# Patient Record
Sex: FEMALE | Race: WHITE | Employment: OTHER | URBAN - METROPOLITAN AREA
[De-identification: names, ages, dates, MRNs, and addresses within clinical notes are randomized per-mention and may not be internally consistent; named-entity substitution may affect disease eponyms.]

---

## 2017-01-11 ENCOUNTER — OFFICE VISIT (OUTPATIENT)
Dept: RHEUMATOLOGY | Age: 62
End: 2017-01-11

## 2017-01-11 VITALS
BODY MASS INDEX: 17.61 KG/M2 | DIASTOLIC BLOOD PRESSURE: 80 MMHG | TEMPERATURE: 98 F | SYSTOLIC BLOOD PRESSURE: 138 MMHG | HEIGHT: 66 IN | HEART RATE: 81 BPM | WEIGHT: 109.6 LBS

## 2017-01-11 DIAGNOSIS — M06.09 SERONEGATIVE RHEUMATOID ARTHRITIS OF MULTIPLE SITES (HCC): ICD-10-CM

## 2017-01-11 DIAGNOSIS — M19.90 OSTEOARTH NOS-UNSPEC: Primary | ICD-10-CM

## 2017-01-11 NOTE — MR AVS SNAPSHOT
Visit Information Date & Time Provider Department Dept. Phone Encounter #  
 1/11/2017  8:20 AM Juan Jones MD [facility] [id number] Follow-up Instructions Return in about 4 months (around 5/11/2017). Your Appointments 1/24/2017 11:00 AM  
ESTABLISHED PATIENT with MD Shruti Zuniga. [address] [facility]) Appt Note:   
 [address] [facility] [address] [place] [address]  
[phone] [address] Upcoming Health Maintenance Date Due COLONOSCOPY 8/22/1975 Pneumococcal 19-64 Medium Risk (1 of 1 - PPSV23) 8/22/1976 INFLUENZA AGE 9 TO ADULT 8/1/2016 BREAST CANCER SCRN MAMMOGRAM 6/1/2017 DTaP/Tdap/Td series (2 - Td) 10/30/2025 Allergies as of 1/11/2017  Review Complete On: 1/11/2017 By: Griselda Alvarado LPN No Known Allergies Current Immunizations  Reviewed on 10/30/2015 Name Date Influenza Vaccine 11/5/2014 Influenza Vaccine (Quad) 10/30/2015 10:27 AM  
 Tdap 10/30/2015 10:27 AM  
  
 Not reviewed this visit Vitals BP Pulse Temp Height(growth percentile) Weight(growth percentile) BMI  
 138/80 (BP 1 Location: Right arm, BP Patient Position: Sitting) 81 98 °F (36.7 °C) (Oral) 5' 6\" (1.676 m) 109 lb 9.6 oz (49.7 kg) 17.69 kg/m2 OB Status Smoking Status Hysterectomy Current Every Day Smoker Vitals History BMI and BSA Data Body Mass Index Body Surface Area  
 17.69 kg/m 2 1.52 m 2 Preferred Pharmacy Pharmacy Name Phone [facility] [address] [facility], [address] [place] [address] RDS. [phone] Your Updated Medication List  
  
   
This list is accurate as of: 1/11/17  8:50 AM.  Always use your most recent med list.  
  
  
  
  
 ADDERALL 10 mg tablet Generic drug:  dextroamphetamine-amphetamine Take 10 mg by mouth. diclofenac EC 75 mg EC tablet Commonly known as:  VOLTAREN  
TAKE ONE TABLET BY MOUTH TWICE A DAY FLUoxetine 40 mg capsule Commonly known as:  PROzac TAKE ONE CAPSULE BY MOUTH DAILY  Indications: ANXIETY WITH DEPRESSION  
  
 lisinopril 10 mg tablet Commonly known as:  PRINIVIL, ZESTRIL  
TAKE ONE TABLET BY MOUTH DAILY  
  
 naproxen 500 mg tablet Commonly known as:  NAPROSYN Take 1 Tab by mouth two (2) times daily (with meals) for 90 days. triamterene-hydroCHLOROthiazide 37.5-25 mg per capsule Commonly known as:  Olive Oyster TAKE ONE CAPSULE BY MOUTH DAILY FOR HYPERTENSION  Indications: HYPERTENSION Follow-up Instructions Return in about 4 months (around 5/11/2017). Introducing [facility] & HEALTH SERVICES! Frankie Cano introduces [employer] patient portal. Now you can access parts of your medical record, email your doctor's office, and request medication refills online. 1. In your internet browser, go to [email or web address] 2. Click on the First Time User? Click Here link in the Sign In box. You will see the New Member Sign Up page. 3. Enter your [employer] Access Code exactly as it appears below. You will not need to use this code after youve completed the sign-up process. If you do not sign up before the expiration date, you must request a new code. · [employer] Access Code: [email or web address] Expires: 3/22/2017  2:58 PM 
 
4. Enter the last four digits of your Social Security Number (xxxx) and Date of Birth (mm/dd/yyyy) as indicated and click Submit. You will be taken to the next sign-up page. 5. Create a [employer] ID. This will be your [employer] login ID and cannot be changed, so think of one that is secure and easy to remember. 6. Create a [employer] password. You can change your password at any time. 7. Enter your Password Reset Question and Answer. This can be used at a later time if you forget your password. 8. Enter your e-mail address. You will receive e-mail notification when new information is available in [address] 9. Click Sign Up. You can now view and download portions of your medical record. 10. Click the Download Summary menu link to download a portable copy of your medical information. If you have questions, please visit the Frequently Asked Questions section of the [employer] website. Remember, [employer] is NOT to be used for urgent needs. For medical emergencies, dial 911. Now available from your iPhone and Android! Please provide this summary of care documentation to your next provider. Your primary care clinician is listed as [address]. If you have any questions after today's visit, please call [phone].

## 2017-01-11 NOTE — PROGRESS NOTES
RHEUMATOLOGY PROBLEM LIST AND CHIEF COMPLAINT  1. Inflammatory arthritis - Joint space narrowing of 2nd and 3rd MCPs, morning stiffness, improvement of joint pain throughout the day, one episode of arthritis in right wrist that responded to prednisone, JOSELITO positive, CCP positive, CRP elevated   2. Osteoarthritis      INTERVAL HISTORY  Ms. Brandee Collier is a 63 y.o.  female who returns for follow up. We discussed the study results in detail. The patient has started naproxen 500 mg BID and has noticed a difference since starting this. She denies having morning stiffness today, but states that when she does have morning stiffness it lasts for about 30 minutes. She states that most of her stiffness is in her CMC joints, but now it is spreading to her MCPs. She reports that she will have very infrequent pain in her foot, and states that she believes this is from the way she stands. She reports that she last took prednisone when she was having severe swelling and pain in her right hand. This provided relief of her swelling in about 3 days. She notes that her pain is worse in the morning and improves as she moves. PHYSICAL EXAM  Patient not fully examined; the patient is here to review lab studies, radiologic studies and discuss management and treatment. LABS, RADIOLOGY AND PROCEDURES - Previous available labs, radiology and procedures were reviewed in detail with the patient. The patient was counseled on the labs that were ordered and the meaning of positive and negative results and any disease implication that these labs may have. 12/22/2016 Labs  CRP(28.0) - elevated  JOSELITO IF- positive  CCP(33) - positive  ESR, RF, TSH - normal    12/22/2016 Right hand x-rays  IMPRESSION:Degenerative change. No erosive arthropathy identified. .    12/22/2016 Left hand x-rays  IMPRESSION: Degenerative change. No evidence of erosive arthritis. .    12/22/2016 Right foot x-rays  IMPRESSION: No fracture or evidence of inflammatory arthritis. 12/22/2016 Left foot x-rays  IMPRESSION: No fracture or evidence of inflammatory arthritis. 12/22/2016 Right knee x-rays  IMPRESSION: Mild generalized joint space narrowing but no definite erosive changes or  significant hypertrophic spur formation. Question trace suprapatellar effusion. 12/22/2016 Left foot x-rays  IMPRESSION: Mild generalized joint space narrowing but no definite erosive changes or  significant hypertrophic spur formation. No joint effusion. ASSESSMENT  1. Inflammatory arthritis - Joint space narrowing of 2nd and 3rd MCPs, morning stiffness, improvement of joint pain throughout the day, one episode of arthritis in right wrist that responded to prednisone, JOSELITO positive, CCP positive, CRP elevated (New problem - Progressive disease) - I suspect that the patient has an inflammatory arthritis in addition to osteoarthritis based on the description of her pain and the abnormalities in her labs and imaging studies. This inflammatory arthritis is likely just beginning to develop since she is only having mild morning stiffness and pain. I have recommended that she start plaquenil, but the patient prefers not to start medication at this time because her pain is tolerable. I have suggested that she return in 4 months for a follow up so that we can track any progression of her disease. She can continue on naproxen 500 mg PRN. 2. Osteoarthritis - the patient's x-rays showed squaring of her CMC joints. However, she does not complain of much pain today. PLAN  1. Naproxen 500 mg PRN  2. Monitor symptoms     Total face-to face time was 25 minutes, greater than 50% of which was spent in counseling and coordination of care. The diagnosis, treatment and various other items were discussed in detail: Test results, medication options, possible side effects, lifestyle changes. Avis Gallagher MD, [address] [place] [address]   Adult and Pediatric Rheumatology     [facility], [address], Phone [phone], Fax [phone]     Visiting  of Pediatrics    Department of Pediatrics, [facility] of [address] [place] [address], [address] [place] [address], Phone [phone], Fax [phone]    There are no Patient Instructions on file for this visit. Follow-up Disposition:  Return in about 4 months (around 5/11/2017). cc:  Ramierz Murphy MD    Written by william Elizalde, as dictated by Lucian Deluna.  Gurjit Jin M.D.

## 2017-01-26 ENCOUNTER — OFFICE VISIT (OUTPATIENT)
Dept: BEHAVIORAL/MENTAL HEALTH CLINIC | Age: 62
End: 2017-01-26

## 2017-01-26 VITALS
DIASTOLIC BLOOD PRESSURE: 74 MMHG | WEIGHT: 109 LBS | SYSTOLIC BLOOD PRESSURE: 142 MMHG | HEIGHT: 66 IN | HEART RATE: 92 BPM | BODY MASS INDEX: 17.52 KG/M2

## 2017-01-26 DIAGNOSIS — F39 MOOD DISORDER (HCC): ICD-10-CM

## 2017-01-26 DIAGNOSIS — F90.2 ATTENTION DEFICIT HYPERACTIVITY DISORDER (ADHD), COMBINED TYPE: Primary | ICD-10-CM

## 2017-01-26 RX ORDER — DEXTROAMPHETAMINE SACCHARATE, AMPHETAMINE ASPARTATE, DEXTROAMPHETAMINE SULFATE AND AMPHETAMINE SULFATE 5; 5; 5; 5 MG/1; MG/1; MG/1; MG/1
20 TABLET ORAL DAILY
Qty: 30 TAB | Refills: 0 | Status: SHIPPED | OUTPATIENT
Start: 2017-01-26 | End: 2017-02-25

## 2017-01-26 RX ORDER — DEXTROAMPHETAMINE SACCHARATE, AMPHETAMINE ASPARTATE, DEXTROAMPHETAMINE SULFATE AND AMPHETAMINE SULFATE 5; 5; 5; 5 MG/1; MG/1; MG/1; MG/1
20 TABLET ORAL
COMMUNITY
End: 2017-01-26 | Stop reason: SDUPTHER

## 2017-01-26 RX ORDER — FLUOXETINE HYDROCHLORIDE 20 MG/1
CAPSULE ORAL DAILY
COMMUNITY
End: 2017-01-26

## 2017-01-26 RX ORDER — FLUOXETINE HYDROCHLORIDE 40 MG/1
CAPSULE ORAL
Qty: 30 CAP | Refills: 1 | Status: SHIPPED | OUTPATIENT
Start: 2017-01-26 | End: 2017-02-21 | Stop reason: SDUPTHER

## 2017-01-26 NOTE — MR AVS SNAPSHOT
Visit Information Date & Time Provider Department Dept. Phone Encounter #  
 1/26/2017  9:45 AM Loree Joseph MD [address] [phone] [id number] Follow-up Instructions Return in about 1 month (around 2/26/2017). Your Appointments 5/11/2017  8:20 AM  
ESTABLISHED PATIENT with Ann Ruggiero MD  
[facility]th[facility] and [facility] ([facility]) Appt Note: fu 4 mo  
 [address] [facility] [address]  
[phone]  
  
   
 [address] [address] Upcoming Health Maintenance Date Due COLONOSCOPY 8/22/1975 Pneumococcal 19-64 Medium Risk (1 of 1 - PPSV23) 8/22/1976 INFLUENZA AGE 9 TO ADULT 8/1/2016 BREAST CANCER SCRN MAMMOGRAM 6/1/2017 DTaP/Tdap/Td series (2 - Td) 10/30/2025 Allergies as of 1/26/2017  Review Complete On: 1/26/2017 By: Loree Joseph MD  
 No Known Allergies Current Immunizations  Reviewed on 10/30/2015 Name Date Influenza Vaccine 11/5/2014 Influenza Vaccine (Quad) 10/30/2015 10:27 AM  
 Tdap 10/30/2015 10:27 AM  
  
 Not reviewed this visit You Were Diagnosed With   
  
 Codes Comments Attention deficit hyperactivity disorder (ADHD), combined type    -  Primary ICD-10-CM: F90.2 ICD-9-CM: 314.01 Mood disorder ([facility]     ICD-10-CM: F39 
ICD-9-CM: 296.90 Vitals BP Pulse Height(growth percentile) Weight(growth percentile) BMI OB Status 142/74 (BP 1 Location: Right arm) 92 5' 6\" (1.676 m) 109 lb (49.4 kg) 17.59 kg/m2 Hysterectomy Smoking Status Current Every Day Smoker BMI and BSA Data Body Mass Index Body Surface Area  
 17.59 kg/m 2 1.52 m 2 Preferred Pharmacy Pharmacy Name Phone [facility] Tobin [address] [place] [address] RDS. [phone] Your Updated Medication List  
  
   
 This list is accurate as of: 1/26/17 10:04 AM.  Always use your most recent med list.  
  
  
  
  
 dextroamphetamine-amphetamine 20 mg tablet Commonly known as:  ADDERALL Take 1 Tab (20 mg total) by mouth dailyIndications: ATTENTION-DEFICIT HYPERACTIVITY DISORDER. Max Daily Amount: 20 mg  
  
 diclofenac EC 75 mg EC tablet Commonly known as:  VOLTAREN  
TAKE ONE TABLET BY MOUTH TWICE A DAY FLUoxetine 40 mg capsule Commonly known as:  PROzac TAKE ONE CAPSULE BY MOUTH DAILY IN THE MORNING  Indications: ANXIETY WITH DEPRESSION  
  
 lisinopril 10 mg tablet Commonly known as:  PRINIVIL, ZESTRIL  
TAKE ONE TABLET BY MOUTH DAILY  
  
 naproxen 500 mg tablet Commonly known as:  NAPROSYN Take 1 Tab by mouth two (2) times daily (with meals) for 90 days. triamterene-hydroCHLOROthiazide 37.5-25 mg per capsule Commonly known as:  Elmarie Galaviz TAKE ONE CAPSULE BY MOUTH DAILY FOR HYPERTENSION  Indications: HYPERTENSION Prescriptions Printed Refills FLUoxetine (PROZAC) 40 mg capsule 1 Sig: TAKE ONE CAPSULE BY MOUTH DAILY IN THE MORNING  Indications: ANXIETY WITH DEPRESSION Class: Print  
 dextroamphetamine-amphetamine (ADDERALL) 20 mg tablet 0 Sig: Take 1 Tab (20 mg total) by mouth dailyIndications: ATTENTION-DEFICIT HYPERACTIVITY DISORDER. Max Daily Amount: 20 mg  
 Class: Print Route: Oral  
  
Follow-up Instructions Return in about 1 month (around 2/26/2017). Introducing [facility] & HEALTH SERVICES! Miranda Eden introduces [email or web address] patient portal. Now you can access parts of your medical record, email your doctor's office, and request medication refills online. 1. In your internet browser, go to [email or web address] 2. Click on the First Time User? Click Here link in the Sign In box. You will see the New Member Sign Up page. 3. Enter your [email or web address] Access Code exactly as it appears below.  You will not need to use this code after youve completed the sign-up process. If you do not sign up before the expiration date, you must request a new code. · [employer] Access Code: [email or web address] Expires: 3/22/2017  2:58 PM 
 
4. Enter the last four digits of your Social Security Number (xxxx) and Date of Birth (mm/dd/yyyy) as indicated and click Submit. You will be taken to the next sign-up page. 5. Create a [employer] ID. This will be your [employer] login ID and cannot be changed, so think of one that is secure and easy to remember. 6. Create a [employer] password. You can change your password at any time. 7. Enter your Password Reset Question and Answer. This can be used at a later time if you forget your password. 8. Enter your e-mail address. You will receive e-mail notification when new information is available in [address] 9. Click Sign Up. You can now view and download portions of your medical record. 10. Click the Download Summary menu link to download a portable copy of your medical information. If you have questions, please visit the Frequently Asked Questions section of the [employer] website. Remember, [employer] is NOT to be used for urgent needs. For medical emergencies, dial 911. Now available from your iPhone and Android! Please provide this summary of care documentation to your next provider. Your primary care clinician is listed as [address]. If you have any questions after today's visit, please call [phone].

## 2017-01-27 NOTE — PROGRESS NOTES
Psychiatric Progress Note    Date: 1/26/2017  Account Number:  [id number]  Name: Richar Orta    Length of psychotherapy session: 15 minutes     Total Patient Care Time Spent: 20 minutes : (Coordinated care:  counseling time with patient, individual psychotherapy with patient; discussions with family members and chart review). SUBJECTIVE:   Richar Orta  is a 63 y.o.  female  patient presents for a therapy/psychopharmacological management appointment. Pt was last seen in June of last yr. She is not sure why she did not return for her appointment. Currently off of her meds, feeling irritable, labile, angry, distracted, scattered in her thinking, with poor organization and poor focus. Pt was hyperactive and very restless, lose and distracted during her appointment. Encouraged pt to get back on her meds and reinforced meds and appointment. Pt agreed. Patient denies SI/HI/SIB. No evidence of AH/VH or delusions.       Appetite:no change from normal   Sleep: no change     Response to Treatment: Positive   Side Effects: none      Supportive/Cognitive/Reality-Oriented psychotherapy provided in regards to psychosocial stressors:   pre-admission and current problems   Housing issues   Occupational issues   Academic issues   Legal issues   Medical issues   Interpersonal conflicts   Stress of hospitalization  Psychoeducation provided  Treatment plan reviewed with patient-including diagnosis and medications  Worked on issues of denial & effects of substance dependency/use      OBJECTIVE:                 Mental Status exam: WNL except for      Sensorium  oriented to time, place and person   Relations cooperative    Eye Contact    appropriate   Appearance:  age appropriate, casually dressed and groomed, very thin almost emaciated but full of energy   Motor Behavior:  hyperactive and within normal limits   Speech:  hyperverbal   Thought Process: loose associations and tangential   Thought Content free of delusions and free of hallucinations   Suicidal ideations none   Homicidal ideations none   Mood:  Irritable/angry/labile/distracted    Affect:  Hyper/labile   Memory recent  adequate   Memory remote:  adequate   Concentration:  distracted   Abstraction:  abstract   Insight:  fair   Reliability fair   Judgment:  fair       No Known Allergies     Current Outpatient Prescriptions   Medication Sig Dispense Refill    FLUoxetine (PROZAC) 40 mg capsule TAKE ONE CAPSULE BY MOUTH DAILY IN THE MORNING  Indications: ANXIETY WITH DEPRESSION 30 Cap 1    dextroamphetamine-amphetamine (ADDERALL) 20 mg tablet Take 1 Tab (20 mg total) by mouth dailyIndications: ATTENTION-DEFICIT HYPERACTIVITY DISORDER. Max Daily Amount: 20 mg 30 Tab 0    lisinopril (PRINIVIL, ZESTRIL) 10 mg tablet TAKE ONE TABLET BY MOUTH DAILY 30 Tab 4    triamterene-hydrochlorothiazide (DYAZIDE) 37.5-25 mg per capsule TAKE ONE CAPSULE BY MOUTH DAILY FOR HYPERTENSION  Indications: HYPERTENSION 90 Cap 1    naproxen (NAPROSYN) 500 mg tablet Take 1 Tab by mouth two (2) times daily (with meals) for 90 days. 180 Tab 6    diclofenac EC (VOLTAREN) 75 mg EC tablet TAKE ONE TABLET BY MOUTH TWICE A DAY (Patient not taking: Reported on 1/11/2017) 14 Tab 0        Medication Changes/Adjustments:   Medications Discontinued During This Encounter   Medication Reason    dextroamphetamine-amphetamine (ADDERALL) 10 mg tablet Other    FLUoxetine (PROZAC) 20 mg capsule Not A Current Medication    FLUoxetine (PROZAC) 40 mg capsule Reorder    dextroamphetamine-amphetamine (ADDERALL) 20 mg tablet Reorder          ASSESSMENT:  Brandee Collier  is a 63 y.o.  female patient presented for her f/u appointment. Pt was last seen in June, off of her meds, very distracted, and disorganized in her thoughts, hyper, labile, restless, recommended resuming her psych meds and reinforced compliance.      Diagnoses:   Axis I: Mood disorder sec to [facility]   ADHD   Caffeine Dependence   Axis II: Deferred   Axis III: HTN, AVM, and Arthritis   Axis IV:Moderate  Axis V: 55-65    RECOMMENDATIONS/PLAN:   1. Medications: Medications reviewed, start pt back on her meds including Adderall 20mg daily, and Prozac 40mg daily. Orders Placed This Encounter    DISCONTD: FLUoxetine (PROZAC) 20 mg capsule    DISCONTD: dextroamphetamine-amphetamine (ADDERALL) 20 mg tablet    FLUoxetine (PROZAC) 40 mg capsule    dextroamphetamine-amphetamine (ADDERALL) 20 mg tablet      2. Follow-up Disposition:  Return in about 1 month (around 2/26/2017).

## 2017-02-01 ENCOUNTER — OFFICE VISIT (OUTPATIENT)
Dept: INTERNAL MEDICINE CLINIC | Age: 62
End: 2017-02-01

## 2017-02-01 VITALS
HEART RATE: 66 BPM | DIASTOLIC BLOOD PRESSURE: 60 MMHG | TEMPERATURE: 96.7 F | BODY MASS INDEX: 17.99 KG/M2 | HEIGHT: 65 IN | SYSTOLIC BLOOD PRESSURE: 130 MMHG | RESPIRATION RATE: 16 BRPM | OXYGEN SATURATION: 96 % | WEIGHT: 108 LBS

## 2017-02-01 DIAGNOSIS — R05.3 PERSISTENT COUGH FOR 3 WEEKS OR LONGER: Primary | ICD-10-CM

## 2017-02-01 RX ORDER — AZITHROMYCIN 250 MG/1
250 TABLET, FILM COATED ORAL SEE ADMIN INSTRUCTIONS
Qty: 6 TAB | Refills: 0 | Status: SHIPPED | OUTPATIENT
Start: 2017-02-01 | End: 2017-02-06

## 2017-02-01 NOTE — PATIENT INSTRUCTIONS
1. Mucinex (Guaifenesen) plain-Blue Box 600 mg. Take one twice daily with full glass water   Take for 10 days    2. Saline Nasal Spray - use liberally to flush post-nasal area; use as many times a day as desired. Keep spraying with head tilted back until you feel need to swallow    3. Drink lots of fluids (mainly water) to keep mucus thinner    4. If needed, for cough, we recommend Delsym cough syrup    5. Long acting antihistamine (Allegra/Fexofenadine or Zyrtec/Ceterizine) is useful if allergy symptoms are also present    6. Decongestants should only be used for about 3 days. After that, they actually contribute to overdrying/thickening of the mucus, and can raise the BP and overstimulate the heart    7.  Steroid nasal spray (Nasacort AQ or Flonase) - 2 sprays each nostril once daily; use with head in upright position

## 2017-02-01 NOTE — PROGRESS NOTES
1. Have you been to the ER, urgent care clinic since your last visit? Hospitalized since your last visit? No    2. Have you seen or consulted any other health care providers outside of the [address] [place] [address] since your last visit? Include any pap smears or colon screening.  No  Chief Complaint   Patient presents with    Cold Symptoms     fever, sneezing, productive cough, sinus pain, left ear pain, sore throat, chills an fatigue x 4 weeks

## 2017-02-01 NOTE — PROGRESS NOTES
64 yo female reports 4 weeks of productive cough, sinus discomfort and L ear pain. She has taken Coricidin, Zyrtec and Mucinex Max powder and using Flonase NS. She was exposed to her sick grandchildren at the beginning of her illness. She continues to smoke cigarettes. PE: Thin WF   T - 96.7   Phar - red   TMs - dull   Neck - no nodes   Lungs - clear   Heart - 100/M and reg    Imp: Cough for 4 weeks   Tobacco user    Plan: Encouraged her to stop smoking   Change to plain Mucinex (without decongestant)   Z-pack    Encouraged her to see Dr. Gracia Fuens for routine HTN f/u in next few months  _________________________  Expected course of current diagnosed problem(s) as well as expected progression and possible complications, and desired follow up with provider are discussed with patient. Patient is encouraged to be back in touch with any questions or concerns. Patient expresses understanding of plan of care. Patient is given AVS which includes diagnoses, current medications, vitals.

## 2017-02-01 NOTE — MR AVS SNAPSHOT
Visit Information Date & Time Provider Department Dept. Phone Encounter #  
 2/1/2017  9:00 AMALIA/ Susan 68, NP [facility] [phone] [id number] Your Appointments 2/21/2017  9:00 AM  
ESTABLISHED PATIENT with Stormy Williamson MD  
[address]) Appt Note: fu  
 [address]  
[phone] 217 [facility] [address] [place] [address] 5/11/2017  8:20 AM  
ESTABLISHED PATIENT with Chante Campbell MD  
Arthritis and [address] ([address]) Appt Note: fu 4 mo  
 [address] [facility] [address]  
[phone]  
  
   
 [address] [address] Upcoming Health Maintenance Date Due COLONOSCOPY 8/22/1975 Pneumococcal 19-64 Medium Risk (1 of 1 - PPSV23) 8/22/1976 INFLUENZA AGE 9 TO ADULT 8/1/2016 BREAST CANCER SCRN MAMMOGRAM 6/1/2017 DTaP/Tdap/Td series (2 - Td) 10/30/2025 Allergies as of 2/1/2017  Review Complete On: 2/1/2017 By: Mendoza Carroll NP No Known Allergies Current Immunizations  Reviewed on 10/30/2015 Name Date Influenza Vaccine 11/5/2014 Influenza Vaccine (Quad) 10/30/2015 10:27 AM  
 Tdap 10/30/2015 10:27 AM  
  
 Not reviewed this visit You Were Diagnosed With   
  
 Codes Comments Persistent cough for 3 weeks or longer    -  Primary ICD-10-CM: R05 ICD-9-CM: 611. 2 Vitals BP Pulse Temp Resp Height(growth percentile) Weight(growth percentile) 130/60 (BP 1 Location: Left arm, BP Patient Position: Sitting) 66 96.7 °F (35.9 °C) (Oral) 16 5' 5\" (1.651 m) 108 lb (49 kg) SpO2 BMI OB Status Smoking Status 96% 17.97 kg/m2 Hysterectomy Current Every Day Smoker Vitals History BMI and BSA Data Body Mass Index Body Surface Area  
 17.97 kg/m 2 1.5 m 2 Preferred Pharmacy Pharmacy Name Phone Kristin Blizzard [address] [place] [address], [facility] Vicki Huang R[facility]. [phone] Your Updated Medication List  
  
   
This list is accurate as of: 2/1/17  9:37 AM.  Always use your most recent med list.  
  
  
  
  
 azithromycin 250 mg tablet Commonly known as:  Unice Messing Take 1 Tab by mouth See Admin Instructions for 5 days. dextroamphetamine-amphetamine 20 mg tablet Commonly known as:  ADDERALL Take 1 Tab (20 mg total) by mouth dailyIndications: ATTENTION-DEFICIT HYPERACTIVITY DISORDER. Max Daily Amount: 20 mg FLUoxetine 40 mg capsule Commonly known as:  PROzac TAKE ONE CAPSULE BY MOUTH DAILY IN THE MORNING  Indications: ANXIETY WITH DEPRESSION  
  
 lisinopril 10 mg tablet Commonly known as:  PRINIVIL, ZESTRIL  
TAKE ONE TABLET BY MOUTH DAILY  
  
 naproxen 500 mg tablet Commonly known as:  NAPROSYN Take 1 Tab by mouth two (2) times daily (with meals) for 90 days. triamterene-hydroCHLOROthiazide 37.5-25 mg per capsule Commonly known as:  Alexi Makos TAKE ONE CAPSULE BY MOUTH DAILY FOR HYPERTENSION  Indications: HYPERTENSION Prescriptions Sent to Pharmacy Refills  
 azithromycin (ZITHROMAX) 250 mg tablet 0 Sig: Take 1 Tab by mouth See Admin Instructions for 5 days. Class: Normal  
 Pharmacy: Kristin Blizzard [address] [place] [address], [facility] [place] Vicki Huang [facility]. Ph #: [phone] Route: Oral  
  
Patient Instructions 1. Mucinex (Guaifenesen) plain-Blue Box 600 mg. Take one twice daily with full glass water Take for 10 days 2. Saline Nasal Spray - use liberally to flush post-nasal area; use as many times a day as desired. Keep spraying with head tilted back until you feel need to swallow 3. Drink lots of fluids (mainly water) to keep mucus thinner 4. If needed, for cough, we recommend Delsym cough syrup 5.  Long acting antihistamine (Allegra/Fexofenadine or Zyrtec/Ceterizine) is useful if allergy symptoms are also present 6. Decongestants should only be used for about 3 days. After that, they actually contribute to overdrying/thickening of the mucus, and can raise the BP and overstimulate the heart 7. Steroid nasal spray (Nasacort AQ or Flonase) - 2 sprays each nostril once daily; use with head in upright position Introducing [facility] SERVICES! Buck Short introduces [employer] patient portal. Now you can access parts of your medical record, email your doctor's office, and request medication refills online. 1. In your internet browser, go to [email or web address] 2. Click on the First Time User? Click Here link in the Sign In box. You will see the New Member Sign Up page. 3. Enter your [employer] Access Code exactly as it appears below. You will not need to use this code after youve completed the sign-up process. If you do not sign up before the expiration date, you must request a new code. · [employer] Access Code: [email or web address] Expires: 3/22/2017  2:58 PM 
 
4. Enter the last four digits of your Social Security Number (xxxx) and Date of Birth (mm/dd/yyyy) as indicated and click Submit. You will be taken to the next sign-up page. 5. Create a N[employer] ID. This will be your N[employer] login ID and cannot be changed, so think of one that is secure and easy to remember. 6. Create a N[employer] password. You can change your password at any time. 7. Enter your Password Reset Question and Answer. This can be used at a later time if you forget your password. 8. Enter your e-mail address. You will receive e-mail notification when new information is available in [address] 9. Click Sign Up. You can now view and download portions of your medical record. 10. Click the Download Summary menu link to download a portable copy of your medical information.  
 
If you have questions, please visit the Frequently Asked Questions section of the Rdio. Remember, [email or web address] is NOT to be used for urgent needs. For medical emergencies, dial 911. Now available from your iPhone and Android! Please provide this summary of care documentation to your next provider. Your primary care clinician is listed as [address]. If you have any questions after today's visit, please call [phone].

## 2017-02-21 ENCOUNTER — OFFICE VISIT (OUTPATIENT)
Dept: BEHAVIORAL/MENTAL HEALTH CLINIC | Age: 62
End: 2017-02-21

## 2017-02-21 VITALS
HEART RATE: 97 BPM | SYSTOLIC BLOOD PRESSURE: 163 MMHG | BODY MASS INDEX: 17.97 KG/M2 | WEIGHT: 108 LBS | DIASTOLIC BLOOD PRESSURE: 87 MMHG

## 2017-02-21 DIAGNOSIS — F06.30 MOOD DISORDER DUE TO A GENERAL MEDICAL CONDITION: Primary | ICD-10-CM

## 2017-02-21 DIAGNOSIS — F90.2 ATTENTION DEFICIT HYPERACTIVITY DISORDER (ADHD), COMBINED TYPE: ICD-10-CM

## 2017-02-21 RX ORDER — FLUOXETINE HYDROCHLORIDE 20 MG/1
CAPSULE ORAL
Qty: 30 CAP | Refills: 1 | Status: SHIPPED | OUTPATIENT
Start: 2017-02-21 | End: 2017-05-30

## 2017-02-21 RX ORDER — DEXTROAMPHETAMINE SACCHARATE, AMPHETAMINE ASPARTATE, DEXTROAMPHETAMINE SULFATE AND AMPHETAMINE SULFATE 5; 5; 5; 5 MG/1; MG/1; MG/1; MG/1
20 TABLET ORAL DAILY
Qty: 30 TAB | Refills: 0 | Status: SHIPPED | OUTPATIENT
Start: 2017-02-21 | End: 2017-03-23

## 2017-02-21 RX ORDER — FLUOXETINE HYDROCHLORIDE 40 MG/1
CAPSULE ORAL
Qty: 30 CAP | Refills: 1 | Status: SHIPPED | OUTPATIENT
Start: 2017-02-21 | End: 2017-03-28 | Stop reason: SDUPTHER

## 2017-02-21 NOTE — PROGRESS NOTES
Psychiatric Progress Note    Date: 2/21/2017  Account Number:  [id number]  Name: Alyse Mosley    Length of psychotherapy session: 15 minutes     Total Patient Care Time Spent: 20 minutes : (Coordinated care:  counseling time with patient, individual psychotherapy with patient; discussions with family members and chart review). SUBJECTIVE:   Alyse Mosley  is a 63 y.o.  female  patient presents for a therapy/psychopharmacological management appointment. Pt reports improvement in her mood since the initiation of medications. She is less angry and upset, somewhat less distracted, and little more focused, some improvement in hyperactivity is also noted, though not a whole lot. Pt states that her family has also noticed a difference. Pt states that her mood is improved 50%, there is still room for improvement. Discussed the need to go up on Prozac, pt is not able to afford higher dose of Adderall so will continue the same dose for now. Pt reports problems with her memory which seem to be secondary to her mood and uncontrolled ADHD. Will continue to monitor. Patient denies SI/HI/SIB. No evidence of AH/VH or delusions.       Appetite:no change from normal   Sleep: no change     Response to Treatment: Positive   Side Effects: none      Supportive/Cognitive/Reality-Oriented psychotherapy provided in regards to psychosocial stressors:   pre-admission and current problems   Housing issues   Occupational issues   Academic issues   Legal issues   Medical issues   Interpersonal conflicts   Stress of hospitalization  Psychoeducation provided  Treatment plan reviewed with patient-including diagnosis and medications  Worked on issues of denial & effects of substance dependency/use      OBJECTIVE:                 Mental Status exam: WNL except for      Sensorium  oriented to time, place and person   Relations cooperative    Eye Contact    appropriate   Appearance:  age appropriate, casually dressed and groomed, thin    Motor Behavior:  hyperactive and within normal limits   Speech:  hyperverbal   Thought Process: loose associations and tangential   Thought Content free of delusions and free of hallucinations   Suicidal ideations none   Homicidal ideations none   Mood:  improve   Affect:  Less hyper and less labile   Memory recent  adequate   Memory remote:  adequate   Concentration:  adequate   Abstraction:  abstract   Insight:  fair   Reliability fair   Judgment:  fair       No Known Allergies     Current Outpatient Prescriptions   Medication Sig Dispense Refill    FLUoxetine (PROZAC) 40 mg capsule TAKE ONE CAPSULE BY MOUTH DAILY IN THE MORNING  Indications: ANXIETY WITH DEPRESSION 30 Cap 1    FLUoxetine (PROZAC) 20 mg capsule Take 1 capsule daily in the morning  Indications: ANXIETY WITH DEPRESSION 30 Cap 1    dextroamphetamine-amphetamine (ADDERALL) 20 mg tablet Take 1 Tab (20 mg total) by mouth dailyIndications: ATTENTION-DEFICIT HYPERACTIVITY DISORDER. Max Daily Amount: 20 mg 30 Tab 0    dextroamphetamine-amphetamine (ADDERALL) 20 mg tablet Take 1 Tab (20 mg total) by mouth dailyIndications: ATTENTION-DEFICIT HYPERACTIVITY DISORDER. Max Daily Amount: 20 mg 30 Tab 0    naproxen (NAPROSYN) 500 mg tablet Take 1 Tab by mouth two (2) times daily (with meals) for 90 days. 180 Tab 6    lisinopril (PRINIVIL, ZESTRIL) 10 mg tablet TAKE ONE TABLET BY MOUTH DAILY 30 Tab 4    triamterene-hydrochlorothiazide (DYAZIDE) 37.5-25 mg per capsule TAKE ONE CAPSULE BY MOUTH DAILY FOR HYPERTENSION  Indications: HYPERTENSION 90 Cap 1        Medication Changes/Adjustments:   Medications Discontinued During This Encounter   Medication Reason    FLUoxetine (PROZAC) 40 mg capsule Reorder          ASSESSMENT:  Trace Thomas  is a 63 y.o.  female patient presented for her f/u appointment.  Pt seems to be responding to the medications very well, will go up on Prozac dose, will keep the Adderall the same as pt can't afford higher dose or Adderall XR.    Diagnoses:   Axis I: Mood disorder sec to [facility]   ADHD   Caffeine Dependence   Axis II: Deferred   Axis III: HTN, AVM, and Arthritis   Axis IV:Moderate  Axis V: 60-65    RECOMMENDATIONS/PLAN:   1. Medications: Medications reviewed, will increase the dose of Prozac to 60mg daily, continue Adderall as such. Orders Placed This Encounter    FLUoxetine (PROZAC) 40 mg capsule    FLUoxetine (PROZAC) 20 mg capsule    dextroamphetamine-amphetamine (ADDERALL) 20 mg tablet      2. Follow-up Disposition:  Return in about 1 month (around 3/21/2017).

## 2017-02-21 NOTE — MR AVS SNAPSHOT
Visit Information Date & Time Provider Department Dept. Phone Encounter #  
 2/21/2017  9:00 AM Marybel Mott MD [address] [phone] [id number] Follow-up Instructions Return in about 1 month (around 3/21/2017). Your Appointments 5/2/2017  9:20 AM  
ROUTINE CARE with 69[address] MD [facility] ([facility]) Appt Note: 3m htn fu  
 [address] , [facility] [address]  
[address], [address] [address]  
  
    
 5/11/2017  8:20 AM  
ESTABLISHED PATIENT with Kacie aLnge MD  
Arthritis and [address] ([facility]) Appt Note: fu 4 mo  
 [address] [facility] [address]  
[phone]  
  
   
 [address] [address] Upcoming Health Maintenance Date Due COLONOSCOPY 8/22/1975 Pneumococcal 19-64 Medium Risk (1 of 1 - PPSV23) 8/22/1976 INFLUENZA AGE 9 TO ADULT 8/1/2016 BREAST CANCER SCRN MAMMOGRAM 6/1/2017 DTaP/Tdap/Td series (2 - Td) 10/30/2025 Allergies as of 2/21/2017  Review Complete On: 2/21/2017 By: Marybel Mott MD  
 No Known Allergies Current Immunizations  Reviewed on 10/30/2015 Name Date Influenza Vaccine 11/5/2014 Influenza Vaccine (Quad) 10/30/2015 10:27 AM  
 Tdap 10/30/2015 10:27 AM  
  
 Not reviewed this visit You Were Diagnosed With   
  
 Codes Comments Mood disorder due to a general medical condition    -  Primary ICD-10-CM: F06.30 ICD-9-CM: 293.83 Attention deficit hyperactivity disorder (ADHD), combined type     ICD-10-CM: F90.2 ICD-9-CM: 314.01 Vitals BP Pulse Weight(growth percentile) BMI OB Status Smoking Status 163/87 (BP 1 Location: Left arm) 97 108 lb (49 kg) 17.97 kg/m2 Hysterectomy Current Every Day Smoker BMI and BSA Data Body Mass Index Body Surface Area  
 17.97 kg/m 2 1.5 m 2 Preferred Pharmacy Pharmacy Name Phone Natalie Black [address] [phone] Your Updated Medication List  
  
   
This list is accurate as of: 2/21/17  9:40 AM.  Always use your most recent med list.  
  
  
  
  
 * dextroamphetamine-amphetamine 20 mg tablet Commonly known as:  ADDERALL Take 1 Tab (20 mg total) by mouth dailyIndications: ATTENTION-DEFICIT HYPERACTIVITY DISORDER. Max Daily Amount: 20 mg  
  
 * dextroamphetamine-amphetamine 20 mg tablet Commonly known as:  ADDERALL Take 1 Tab (20 mg total) by mouth dailyIndications: ATTENTION-DEFICIT HYPERACTIVITY DISORDER. Max Daily Amount: 20 mg  
  
 * FLUoxetine 40 mg capsule Commonly known as:  PROzac TAKE ONE CAPSULE BY MOUTH DAILY IN THE MORNING  Indications: ANXIETY WITH DEPRESSION  
  
 * FLUoxetine 20 mg capsule Commonly known as:  PROzac Take 1 capsule daily in the morning  Indications: ANXIETY WITH DEPRESSION  
  
 lisinopril 10 mg tablet Commonly known as:  PRINIVIL, ZESTRIL  
TAKE ONE TABLET BY MOUTH DAILY  
  
 naproxen 500 mg tablet Commonly known as:  NAPROSYN Take 1 Tab by mouth two (2) times daily (with meals) for 90 days. triamterene-hydroCHLOROthiazide 37.5-25 mg per capsule Commonly known as:  Romansh Charli TAKE ONE CAPSULE BY MOUTH DAILY FOR HYPERTENSION  Indications: HYPERTENSION  
  
 * Notice: This list has 4 medication(s) that are the same as other medications prescribed for you. Read the directions carefully, and ask your doctor or other care provider to review them with you. Prescriptions Printed Refills  
 dextroamphetamine-amphetamine (ADDERALL) 20 mg tablet 0 Sig: Take 1 Tab (20 mg total) by mouth dailyIndications: ATTENTION-DEFICIT HYPERACTIVITY DISORDER. Max Daily Amount: 20 mg  
 Class: Print Route: Oral  
  
Prescriptions Sent to Pharmacy Refills FLUoxetine (PROZAC) 40 mg capsule 1 Sig: TAKE ONE CAPSULE BY MOUTH DAILY IN THE MORNING  Indications: ANXIETY WITH DEPRESSION Class: Normal  
 Pharmacy: [facility] [address]. Ph #: [phone] FLUoxetine (PROZAC) 20 mg capsule 1 Sig: Take 1 capsule daily in the morning  Indications: ANXIETY WITH DEPRESSION Class: Normal  
 Pharmacy: [facility] [address] [facility]. Ph #: [phone] Follow-up Instructions Return in about 1 month (around 3/21/2017). Introducing [facility] SERVICES! [employer] introduces [employer] patient portal. Now you can access parts of your medical record, email your doctor's office, and request medication refills online. 1. In your internet browser, go to [email or web address] 2. Click on the First Time User? Click Here link in the Sign In box. You will see the New Member Sign Up page. 3. Enter your [employer] Access Code exactly as it appears below. You will not need to use this code after youve completed the sign-up process. If you do not sign up before the expiration date, you must request a new code. · [employer] Access Code: [email or web address] Expires: 3/22/2017  2:58 PM 
 
4. Enter the last four digits of your Social Security Number (xxxx) and Date of Birth (mm/dd/yyyy) as indicated and click Submit. You will be taken to the next sign-up page. 5. Create a [employer] ID. This will be your [employer] login ID and cannot be changed, so think of one that is secure and easy to remember. 6. Create a [employer] password. You can change your password at any time. 7. Enter your Password Reset Question and Answer. This can be used at a later time if you forget your password. 8. Enter your e-mail address. You will receive e-mail notification when new information is available in [address] 9. Click Sign Up. You can now view and download portions of your medical record. 10. Click the Download Summary menu link to download a portable copy of your medical information. If you have questions, please visit the Frequently Asked Questions section of the TapSense website. Remember, TapSense is NOT to be used for urgent needs. For medical emergencies, dial 911. Now available from your iPhone and Android! Please provide this summary of care documentation to your next provider. Your primary care clinician is listed as [address]. If you have any questions after today's visit, please call [phone].

## 2017-03-28 ENCOUNTER — OFFICE VISIT (OUTPATIENT)
Dept: BEHAVIORAL/MENTAL HEALTH CLINIC | Age: 62
End: 2017-03-28

## 2017-03-28 VITALS — HEIGHT: 65 IN

## 2017-03-28 DIAGNOSIS — F90.2 ATTENTION DEFICIT HYPERACTIVITY DISORDER (ADHD), COMBINED TYPE: Primary | ICD-10-CM

## 2017-03-28 DIAGNOSIS — F39 MOOD DISORDER (HCC): ICD-10-CM

## 2017-03-28 RX ORDER — FLUOXETINE HYDROCHLORIDE 40 MG/1
80 CAPSULE ORAL DAILY
Qty: 60 CAP | Refills: 1 | Status: SHIPPED | OUTPATIENT
Start: 2017-03-28 | End: 2017-04-27

## 2017-03-28 RX ORDER — DEXTROAMPHETAMINE SACCHARATE, AMPHETAMINE ASPARTATE, DEXTROAMPHETAMINE SULFATE AND AMPHETAMINE SULFATE 5; 5; 5; 5 MG/1; MG/1; MG/1; MG/1
20 TABLET ORAL
COMMUNITY
End: 2017-03-28 | Stop reason: SDUPTHER

## 2017-03-28 RX ORDER — DEXTROAMPHETAMINE SACCHARATE, AMPHETAMINE ASPARTATE, DEXTROAMPHETAMINE SULFATE AND AMPHETAMINE SULFATE 5; 5; 5; 5 MG/1; MG/1; MG/1; MG/1
20 TABLET ORAL 2 TIMES DAILY
Qty: 60 TAB | Refills: 0 | Status: SHIPPED | OUTPATIENT
Start: 2017-04-27 | End: 2017-05-27

## 2017-03-28 RX ORDER — DEXTROAMPHETAMINE SACCHARATE, AMPHETAMINE ASPARTATE, DEXTROAMPHETAMINE SULFATE AND AMPHETAMINE SULFATE 5; 5; 5; 5 MG/1; MG/1; MG/1; MG/1
20 TABLET ORAL 2 TIMES DAILY
Qty: 60 TAB | Refills: 0 | Status: SHIPPED | OUTPATIENT
Start: 2017-03-28 | End: 2017-04-27

## 2017-03-28 NOTE — MR AVS SNAPSHOT
Visit Information Date & Time Provider Department Dept. Phone Encounter #  
 3/28/2017  9:00 AM MD [address] [phone] [id number] Your Appointments 5/2/2017  9:20 AM  
ROUTINE CARE with MD Nidia Lawler [facility] ([address]) Appt Note: 3m htn fu  
 [address] , [address] [facility] [address]  
[address], [address] [address]  
  
    
 5/11/2017  8:20 AM  
ESTABLISHED PATIENT with Danielle Venegas MD  
Arthritis and [address] [facility] [address] ([address]) Appt Note: fu 4 mo  
 [address] [facility] [address]  
  
   
 [address] [address]  
  
    
 5/30/2017  9:30 AM  
ESTABLISHED PATIENT with MD Neeta [address] [facility]) Appt Note: FU  
 [address]  
[phone] [address] Upcoming Health Maintenance Date Due COLONOSCOPY 8/22/1975 Pneumococcal 19-64 Medium Risk (1 of 1 - PPSV23) 8/22/1976 INFLUENZA AGE 9 TO ADULT 8/1/2016 BREAST CANCER SCRN MAMMOGRAM 6/1/2017 DTaP/Tdap/Td series (2 - Td) 10/30/2025 Allergies as of 3/28/2017  Review Complete On: 3/28/2017 By: Salome White MD  
 No Known Allergies Current Immunizations  Reviewed on 10/30/2015 Name Date Influenza Vaccine 11/5/2014 Influenza Vaccine (Quad) 10/30/2015 10:27 AM  
 Tdap 10/30/2015 10:27 AM  
  
 Not reviewed this visit You Were Diagnosed With   
  
 Codes Comments Attention deficit hyperactivity disorder (ADHD), combined type    -  Primary ICD-10-CM: F90.2 ICD-9-CM: 314.01 Mood disorder ([facility] [address]     ICD-10-CM: F39 
ICD-9-CM: 296.90 Vitals Height(growth percentile) OB Status Smoking Status 5' 5\" (1.651 m) Hysterectomy Current Every Day Smoker Preferred Pharmacy Pharmacy Name Phone Morgan Proctor [address] [phone] Your Updated Medication List  
  
   
This list is accurate as of: 3/28/17  9:29 AM.  Always use your most recent med list.  
  
  
  
  
 * dextroamphetamine-amphetamine 20 mg tablet Commonly known as:  ADDERALL Take 1 Tab (20 mg total) by mouth two (2) times a dayIndications: ATTENTION-DEFICIT HYPERACTIVITY DISORDER. Take second dose after lunch Max Daily Amount: 40 mg  
  
 * dextroamphetamine-amphetamine 20 mg tablet Commonly known as:  ADDERALL Take 1 Tab (20 mg total) by mouth two (2) times a dayIndications: ATTENTION-DEFICIT HYPERACTIVITY DISORDER Earliest Fill Date: 4/27/17. Max Daily Amount: 40 mg  
Start taking on:  4/27/2017 * FLUoxetine 20 mg capsule Commonly known as:  PROzac Take 1 capsule daily in the morning  Indications: ANXIETY WITH DEPRESSION  
  
 * FLUoxetine 40 mg capsule Commonly known as:  PROzac Take 2 Caps by mouth daily for 30 days. TAKE ONE CAPSULE BY MOUTH DAILY IN THE MORNING  Indications: ANXIETY WITH DEPRESSION  
  
 lisinopril 10 mg tablet Commonly known as:  PRINIVIL, ZESTRIL  
TAKE ONE TABLET BY MOUTH DAILY  
  
 triamterene-hydroCHLOROthiazide 37.5-25 mg per capsule Commonly known as:  Mercie Reasons TAKE ONE CAPSULE BY MOUTH DAILY FOR HYPERTENSION  Indications: HYPERTENSION  
  
 * Notice: This list has 4 medication(s) that are the same as other medications prescribed for you. Read the directions carefully, and ask your doctor or other care provider to review them with you. Prescriptions Printed Refills  
 dextroamphetamine-amphetamine (ADDERALL) 20 mg tablet 0 Sig: Take 1 Tab (20 mg total) by mouth two (2) times a dayIndications: ATTENTION-DEFICIT HYPERACTIVITY DISORDER. Take second dose after lunch Max Daily Amount: 40 mg  
 Class: Print  Route: Oral  
 FLUoxetine (PROZAC) 40 mg capsule 1 Sig: Take 2 Caps by mouth daily for 30 days. TAKE ONE CAPSULE BY MOUTH DAILY IN THE MORNING  Indications: ANXIETY WITH DEPRESSION Class: Print Route: Oral  
 dextroamphetamine-amphetamine (ADDERALL) 20 mg tablet 0 Starting on: 4/27/2017 Sig: Take 1 Tab (20 mg total) by mouth two (2) times a dayIndications: ATTENTION-DEFICIT HYPERACTIVITY DISORDER Earliest Fill Date: 4/27/17. Max Daily Amount: 40 mg  
 Class: Print Route: Oral  
  
Introducing [facility] & HEALTH SERVICES! Annika Smith introduces [employer] patient portal. Now you can access parts of your medical record, email your doctor's office, and request medication refills online. 1. In your internet browser, go to [email or web address] 2. Click on the First Time User? Click Here link in the Sign In box. You will see the New Member Sign Up page. 3. Enter your [employer] Access Code exactly as it appears below. You will not need to use this code after youve completed the sign-up process. If you do not sign up before the expiration date, you must request a new code. · [employer] Access Code: [email or web address] Expires: 6/26/2017  9:23 AM 
 
4. Enter the last four digits of your Social Security Number (xxxx) and Date of Birth (mm/dd/yyyy) as indicated and click Submit. You will be taken to the next sign-up page. 5. Create a [employer] ID. This will be your [employer] login ID and cannot be changed, so think of one that is secure and easy to remember. 6. Create a [employer] password. You can change your password at any time. 7. Enter your Password Reset Question and Answer. This can be used at a later time if you forget your password. 8. Enter your e-mail address. You will receive e-mail notification when new information is available in [address] 9. Click Sign Up. You can now view and download portions of your medical record.  
10. Click the Download Summary menu link to download a portable copy of your medical information. If you have questions, please visit the Frequently Asked Questions section of the WaveCheckt website. Remember, SendtoNews is NOT to be used for urgent needs. For medical emergencies, dial 911. Now available from your iPhone and Android! Please provide this summary of care documentation to your next provider. Your primary care clinician is listed as Gerri Cash. If you have any questions after today's visit, please call [phone].

## 2017-03-28 NOTE — PROGRESS NOTES
Psychiatric Progress Note    Date: 3/28/2017  Account Number:  [id number]  Name: Matt Callahan    Length of psychotherapy session: 15 minutes     Total Patient Care Time Spent: 20 minutes : (Coordinated care:  counseling time with patient, individual psychotherapy with patient; discussions with family members and chart review). SUBJECTIVE:   Matt Callahan  is a 63 y.o.  female  patient presents for a therapy/psychopharmacological management appointment. Pt reports some improvement in her mood with increase in Prozac. She is not as labile and irritable, not cursing as much, able to stop herself and think over instead of getting overwhelmed. She still remains intrusive and edgy though. Will continue to titrate up the dose of Prozac. Patient denies SI/HI/SIB. No evidence of AH/VH or delusions.       Appetite:no change from normal   Sleep: no change     Response to Treatment: Positive   Side Effects: none      Supportive/Cognitive/Reality-Oriented psychotherapy provided in regards to psychosocial stressors:   pre-admission and current problems   Housing issues   Occupational issues   Academic issues   Legal issues   Medical issues   Interpersonal conflicts   Stress of hospitalization  Psychoeducation provided  Treatment plan reviewed with patient-including diagnosis and medications  Worked on issues of denial & effects of substance dependency/use      OBJECTIVE:                 Mental Status exam: WNL except for      Sensorium  oriented to time, place and person   Relations cooperative    Eye Contact    appropriate   Appearance:  age appropriate, casually dressed and groomed, thin    Motor Behavior:  hyperactive and within normal limits   Speech:  hyperverbal   Thought Process: loose associations and tangential   Thought Content free of delusions and free of hallucinations   Suicidal ideations none   Homicidal ideations none   Mood:  improve   Affect:  Less intrusive and less labile    Memory recent  adequate   Memory remote:  adequate   Concentration:  adequate   Abstraction:  abstract   Insight:  fair   Reliability fair   Judgment:  fair       No Known Allergies     Current Outpatient Prescriptions   Medication Sig Dispense Refill    dextroamphetamine-amphetamine (ADDERALL) 20 mg tablet Take 1 Tab (20 mg total) by mouth two (2) times a dayIndications: ATTENTION-DEFICIT HYPERACTIVITY DISORDER. Take second dose after lunch Max Daily Amount: 40 mg 60 Tab 0    FLUoxetine (PROZAC) 40 mg capsule Take 2 Caps by mouth daily for 30 days. TAKE ONE CAPSULE BY MOUTH DAILY IN THE MORNING  Indications: ANXIETY WITH DEPRESSION 60 Cap 1    [START ON 4/27/2017] dextroamphetamine-amphetamine (ADDERALL) 20 mg tablet Take 1 Tab (20 mg total) by mouth two (2) times a dayIndications: ATTENTION-DEFICIT HYPERACTIVITY DISORDER Earliest Fill Date: 4/27/17. Max Daily Amount: 40 mg 60 Tab 0    FLUoxetine (PROZAC) 20 mg capsule Take 1 capsule daily in the morning  Indications: ANXIETY WITH DEPRESSION 30 Cap 1    lisinopril (PRINIVIL, ZESTRIL) 10 mg tablet TAKE ONE TABLET BY MOUTH DAILY 30 Tab 4    triamterene-hydrochlorothiazide (DYAZIDE) 37.5-25 mg per capsule TAKE ONE CAPSULE BY MOUTH DAILY FOR HYPERTENSION  Indications: HYPERTENSION 90 Cap 1        Medication Changes/Adjustments:   Medications Discontinued During This Encounter   Medication Reason    dextroamphetamine-amphetamine (ADDERALL) 20 mg tablet Reorder    FLUoxetine (PROZAC) 40 mg capsule Reorder          ASSESSMENT:  Dhara Concepcion  is a 63 y.o.  female patient presented for her f/u appointment. Pt is responding well to the current meds, will continue to titrate up the dose of Prozac. Pt is not able to tolerate the increased dose of Adderall, will keep it the same way. Diagnoses:   Axis I: Mood disorder  ADHD   Caffeine Dependence   Axis II: Deferred   Axis III: HTN, AVM, and Arthritis   Axis IV:Moderate  Axis V: 60-69    RECOMMENDATIONS/PLAN:   1.   Medications: Medications reviewed, will increase the dose of Prozac to 80mg daily, continue Adderall as such. Orders Placed This Encounter    DISCONTD: dextroamphetamine-amphetamine (ADDERALL) 20 mg tablet    dextroamphetamine-amphetamine (ADDERALL) 20 mg tablet    FLUoxetine (PROZAC) 40 mg capsule    dextroamphetamine-amphetamine (ADDERALL) 20 mg tablet      2. Follow-up Disposition:  Return in about 2 months (around 5/28/2017).

## 2017-05-02 ENCOUNTER — OFFICE VISIT (OUTPATIENT)
Dept: INTERNAL MEDICINE CLINIC | Age: 62
End: 2017-05-02

## 2017-05-02 VITALS
TEMPERATURE: 97.7 F | DIASTOLIC BLOOD PRESSURE: 60 MMHG | HEIGHT: 65 IN | OXYGEN SATURATION: 98 % | HEART RATE: 80 BPM | RESPIRATION RATE: 16 BRPM | SYSTOLIC BLOOD PRESSURE: 120 MMHG | BODY MASS INDEX: 17.41 KG/M2 | WEIGHT: 104.5 LBS

## 2017-05-02 DIAGNOSIS — Z12.11 COLON CANCER SCREENING: ICD-10-CM

## 2017-05-02 DIAGNOSIS — Z23 NEED FOR VACCINATION WITH PVAX (PNEUMOCOCCAL VACCINATION): ICD-10-CM

## 2017-05-02 DIAGNOSIS — I10 BENIGN ESSENTIAL HYPERTENSION: Primary | ICD-10-CM

## 2017-05-02 DIAGNOSIS — Z12.31 ENCOUNTER FOR SCREENING MAMMOGRAM FOR BREAST CANCER: ICD-10-CM

## 2017-05-02 DIAGNOSIS — F15.20 CAFFEINE DEPENDENCE (HCC): ICD-10-CM

## 2017-05-02 DIAGNOSIS — F17.200 TOBACCO DEPENDENCE: ICD-10-CM

## 2017-05-02 RX ORDER — LISINOPRIL 10 MG/1
10 TABLET ORAL DAILY
Qty: 90 TAB | Refills: 1 | Status: SHIPPED | OUTPATIENT
Start: 2017-05-02 | End: 2018-07-10 | Stop reason: SDUPTHER

## 2017-05-02 RX ORDER — TRIAMTERENE AND HYDROCHLOROTHIAZIDE 37.5; 25 MG/1; MG/1
CAPSULE ORAL
Qty: 90 CAP | Refills: 1 | Status: SHIPPED | OUTPATIENT
Start: 2017-05-02 | End: 2018-09-04 | Stop reason: SDUPTHER

## 2017-05-02 NOTE — PROGRESS NOTES
Chief Complaint   Patient presents with    Hypertension     Reviewed record in preparation for visit and have obtained necessary documentation. Identified pt with two pt identifiers(name and ). Health Maintenance Due   Topic    COLONOSCOPY     Pneumococcal 19-64 Medium Risk (1 of 1 - PPSV23)    BREAST CANCER SCRN MAMMOGRAM          Chief Complaint   Patient presents with    Hypertension        Wt Readings from Last 3 Encounters:   17 104 lb 8 oz (47.4 kg)   17 108 lb (49 kg)   17 108 lb (49 kg)     Temp Readings from Last 3 Encounters:   17 97.7 °F (36.5 °C) (Oral)   17 96.7 °F (35.9 °C) (Oral)   17 98 °F (36.7 °C) (Oral)     BP Readings from Last 3 Encounters:   17 120/60   17 163/87   17 130/60     Pulse Readings from Last 3 Encounters:   17 80   17 97   17 66           Learning Assessment:  :     Learning Assessment 2015   PRIMARY LEARNER Patient Patient Patient   HIGHEST LEVEL OF EDUCATION - PRIMARY LEARNER  SOME COLLEGE SOME COLLEGE -   BARRIERS PRIMARY LEARNER NONE NONE -   CO-LEARNER CAREGIVER No No -   PRIMARY LANGUAGE ENGLISH ENGLISH ENGLISH    NEED No No -   LEARNER PREFERENCE PRIMARY VIDEOS DEMONSTRATION DEMONSTRATION     - VIDEOS -   LEARNING SPECIAL TOPICS no no -   ANSWERED BY patient patient patient   RELATIONSHIP SELF SELF SELF   ASSESSMENT COMMENT none - -       Depression Screening:  :     No flowsheet data found. Fall Risk Assessment:  :     No flowsheet data found. Abuse Screening:  :     Abuse Screening Questionnaire 2015   Do you ever feel afraid of your partner? N N   Are you in a relationship with someone who physically or mentally threatens you? N N   Is it safe for you to go home?  Y Y       Coordination of Care Questionnaire:  :     1) Have you been to an emergency room, urgent care clinic since your last visit? no   Hospitalized since your last visit? no             2) Have you seen or consulted any other health care providers outside of [address] [place] [address] since your last visit? yes  (Include any pap smears or colon screenings in this section.)    3) Do you have an Advance Directive on file? no    4) Are you interested in receiving information on Advance Directives? NO      Patient is accompanied by self I have received verbal consent from Lily Falcon to discuss any/all medical information while they are present in the room.

## 2017-05-02 NOTE — PROGRESS NOTES
HPI:  Tamy Rodriguez is a 63y.o. year old female who returns to clinic today for routine follow up appointment to discuss the issues below:    Here for f/u of HTN. Last seen by me 10/2015. In Dec Dr. Charisma Beverly diagnosed her newly with an inflammatory arthritis. She is not currently requiring any NSAID. Sees Dr. Gila Nguyen regularly for psychiatric care. Conditions are stable. She reports adherence to current antihypertensive regimen, though refill pattern is inconsistent. She stays very active with her three grandchildren. Caffeine use - she cut back on coffee from 16 cups per day to 8, but then added 32 ounces of sugar free [facility]. Tobacco use - 1/2 ppd. Prior to Admission medications    Medication Sig Start Date End Date Taking? Authorizing Provider   dextroamphetamine-amphetamine (ADDERALL) 20 mg tablet Take 1 Tab (20 mg total) by mouth two (2) times a dayIndications: ATTENTION-DEFICIT HYPERACTIVITY DISORDER Earliest Fill Date: 4/27/17. Max Daily Amount: 40 mg 4/27/17 5/27/17 Yes Lacey Burden MD   FLUoxetine (PROZAC) 20 mg capsule Take 1 capsule daily in the morning  Indications: ANXIETY WITH DEPRESSION  Patient taking differently: Take 1 capsule daily in the morning  Indications: ANXIETY WITH DEPRESSION, Patient states she take 60 mg a day 2/21/17  Yes Lacey Burden MD   lisinopril (PRINIVIL, ZESTRIL) 10 mg tablet TAKE ONE TABLET BY MOUTH DAILY 8/4/16  Yes Aaron Radford MD   triamterene-hydrochlorothiazide (DYAZIDE) 37.5-25 mg per capsule TAKE ONE CAPSULE BY MOUTH DAILY FOR HYPERTENSION  Indications: HYPERTENSION 7/23/16  Yes [address], [place]          No Known Allergies        Review of Systems   Constitutional: Negative for chills, fever and malaise/fatigue. HENT: Negative for congestion. Respiratory: Negative for cough, shortness of breath and wheezing. Cardiovascular: Negative for chest pain, palpitations and leg swelling.    Gastrointestinal: Negative for abdominal pain, blood in stool and heartburn. Musculoskeletal: Positive for joint pain. Negative for falls and myalgias. Neurological: Negative for dizziness and headaches. Physical Exam   Constitutional: She appears well-nourished. Thin female. Neck: Carotid bruit is not present. Cardiovascular: Normal rate, regular rhythm and normal heart sounds. No murmur heard. Pulses:       Carotid pulses are 2+ on the right side, and 2+ on the left side. Pulmonary/Chest: Effort normal and breath sounds normal.   Abdominal: Soft. Bowel sounds are normal. There is no hepatosplenomegaly. There is no tenderness. Musculoskeletal: She exhibits no edema. Psychiatric: She is hyperactive. Visit Vitals    /60 (BP 1 Location: Left arm, BP Patient Position: Sitting)    Pulse 80    Temp 97.7 °F (36.5 °C) (Oral)    Resp 16    Ht 5' 5\" (1.651 m)    Wt 104 lb 8 oz (47.4 kg)    SpO2 98%    BMI 17.39 kg/m2         Assessment & Plan:  Huber Dos Santos was seen today for hypertension. Diagnoses and all orders for this visit:    Benign essential hypertension  Blood pressure likely elevated by her caffeine use. Will continue current regimen for now. Encouraged to take on a regular basis. -     triamterene-hydroCHLOROthiazide (DYAZIDE) 37.5-25 mg per capsule; TAKE ONE CAPSULE BY MOUTH DAILY FOR HYPERTENSION  Indications: hypertension  -     lisinopril (PRINIVIL, ZESTRIL) 10 mg tablet; Take 1 Tab by mouth daily. Caffeine dependence (HCC)  Encouraged gradual reduction in caffeine intake. Tobacco dependence  The patient was counseled on the dangers of tobacco use, and was reluctant to quit. Reviewed strategies to maximize success, including removing cigarettes and smoking materials from environment and stress management. Encounter for screening mammogram for breast cancer  -     GAGE MAMMO BI SCREENING INCL CAD;  Future    Need for vaccination with PVAX (pneumococcal vaccination)  -     PNEUMOCOCCAL POLYSACCHARIDE VACCINE, 23-VALENT, ADULT OR IMMUNOSUPPRESSED PT DOSE,  -     ADMIN PNEUMOCOCCAL VACCINE    Colon cancer screening  -     REFERRAL FOR COLONOSCOPY    Adult BMI <19 kg/sq m  Weight is stable. Encouraged reduction in caffeine intake / empty calories and increase in high protein foods. Follow-up Disposition:  Return in about 6 months (around 11/2/2017) for Establish care with new provider for hypertension. Advised her to call back or return to office if symptoms worsen/change/persist.  Discussed expected course/resolution/complications of diagnosis in detail with patient. Medication risks/benefits/costs/interactions/alternatives discussed with patient. She was given an after visit summary which includes diagnoses, current medications, & vitals. She expressed understanding with the diagnosis and plan.

## 2017-05-02 NOTE — PATIENT INSTRUCTIONS
Pneumococcal Polysaccharide Vaccine: Care Instructions  Your Care Instructions  The pneumococcal polysaccharide vaccine (PPSV) can prevent some of the serious complications of pneumonia. This includes infection in the bloodstream (bacteremia) or throughout the body (septicemia). PPSV is recommended for people ages 72 years and older. People ages 3 to 59 who have a long-term illness should also get the vaccine. This includes people with diabetes, heart disease, liver disease, or lung disease. PPSV can also help people who have a weakened immune system. This includes cancer patients and people who don't have a spleen. The immune system helps your body fight infection and other illnesses. PPSV is given as a shot. It's usually given in the arm. Healthy older adults get the shot once. Other people may need to have a second dose. The shot may cause pain and redness at the site. It may also cause a mild fever for a short time. Follow-up care is a key part of your treatment and safety. Be sure to make and go to all appointments, and call your doctor if you are having problems. It's also a good idea to know your test results and keep a list of the medicines you take. How can you care for yourself at home? · Take an over-the-counter pain medicine, such as acetaminophen (Tylenol), ibuprofen (Advil, Motrin), or naproxen (Aleve), if your arm is sore after the shot. Be safe with medicines. Read and follow all instructions on the label. · Give acetaminophen (Tylenol) or ibuprofen (Advil, Motrin) to your child for pain or fussiness after the shot. Read and follow all instructions on the label. Do not give aspirin to anyone younger than 20. It has been linked to Reye syndrome, a serious illness. · Put ice or a cold pack on the sore area for 10 to 20 minutes at a time. Put a thin cloth between the ice and your skin. When should you call for help? Call 911 anytime you think you may need emergency care.  For example, call if:  · You have severe problems breathing or swallowing. · You have a seizure. Call your doctor now or seek immediate medical care if:  · You get hives. · You have a high fever. · You have any unusual reaction after getting the shot. Watch closely for changes in your health, and be sure to contact your doctor if you have any problems. Where can you learn more? Go to http://farooq-heather.info/. Enter T225 in the search box to learn more about \"Pneumococcal Polysaccharide Vaccine: Care Instructions. \"  Current as of: February 9, 2016  Content Version: 11.2  © 9356-3519 [employer]. Care instructions adapted under license by [employer] (which disclaims liability or warranty for this information). If you have questions about a medical condition or this instruction, always ask your healthcare professional. [address] any warranty or liability for your use of this information.

## 2017-05-30 ENCOUNTER — OFFICE VISIT (OUTPATIENT)
Dept: BEHAVIORAL/MENTAL HEALTH CLINIC | Age: 62
End: 2017-05-30

## 2017-05-30 DIAGNOSIS — F39 MOOD DISORDER (HCC): Primary | ICD-10-CM

## 2017-05-30 DIAGNOSIS — F90.2 ATTENTION DEFICIT HYPERACTIVITY DISORDER (ADHD), COMBINED TYPE: ICD-10-CM

## 2017-05-30 RX ORDER — DEXTROAMPHETAMINE SACCHARATE, AMPHETAMINE ASPARTATE MONOHYDRATE, DEXTROAMPHETAMINE SULFATE AND AMPHETAMINE SULFATE 7.5; 7.5; 7.5; 7.5 MG/1; MG/1; MG/1; MG/1
30 CAPSULE, EXTENDED RELEASE ORAL
Qty: 30 CAP | Refills: 0 | Status: SHIPPED | OUTPATIENT
Start: 2017-05-30 | End: 2017-06-29

## 2017-05-30 RX ORDER — DEXTROAMPHETAMINE SACCHARATE, AMPHETAMINE ASPARTATE MONOHYDRATE, DEXTROAMPHETAMINE SULFATE AND AMPHETAMINE SULFATE 7.5; 7.5; 7.5; 7.5 MG/1; MG/1; MG/1; MG/1
30 CAPSULE, EXTENDED RELEASE ORAL
Qty: 30 CAP | Refills: 0 | Status: SHIPPED | OUTPATIENT
Start: 2017-06-29 | End: 2017-07-29

## 2017-05-30 RX ORDER — FLUOXETINE HYDROCHLORIDE 40 MG/1
80 CAPSULE ORAL DAILY
Qty: 60 CAP | Refills: 1 | Status: SHIPPED | OUTPATIENT
Start: 2017-05-30 | End: 2017-07-31 | Stop reason: SDUPTHER

## 2017-05-30 NOTE — MR AVS SNAPSHOT
Visit Information Date & Time Provider Department Dept. Phone Encounter #  
 5/30/2017  9:30 AM Radha Bustamante MD [address] [phone] [id number] Follow-up Instructions Return in about 2 months (around 7/30/2017). Upcoming Health Maintenance Date Due COLONOSCOPY 8/22/1975 BREAST CANCER SCRN MAMMOGRAM 6/1/2017 INFLUENZA AGE 9 TO ADULT 8/1/2017 DTaP/Tdap/Td series (2 - Td) 10/30/2025 Allergies as of 5/30/2017  Review Complete On: 5/30/2017 By: Radha Bustamante MD  
 No Known Allergies Current Immunizations  Reviewed on 5/2/2017 Name Date Influenza Vaccine 11/5/2014 Influenza Vaccine (Quad) 10/30/2015 10:27 AM  
 Pneumococcal Polysaccharide (PPSV-23) 5/2/2017 Tdap 10/30/2015 10:27 AM  
  
 Not reviewed this visit You Were Diagnosed With   
  
 Codes Comments [facility] disorder ([facility]    -  Primary ICD-10-CM: F39 
ICD-9-CM: 296.90 Attention deficit hyperactivity disorder (ADHD), combined type     ICD-10-CM: F90.2 ICD-9-CM: 314.01 Vitals OB Status Smoking Status Hysterectomy Current Every Day Smoker Preferred Pharmacy Pharmacy Name Phone [facility] [address] [place] [address], [address] [place] [address] [facility]. [phone] Your Updated Medication List  
  
   
This list is accurate as of: 5/30/17  9:59 AM.  Always use your most recent med list.  
  
  
  
  
 * amphetamine-dextroamphetamine XR 30 mg XR capsule Commonly known as:  ADDERALL XR Take 1 Cap (30 mg total) by mouth every morningIndications: ATTENTION-DEFICIT HYPERACTIVITY DISORDER. Max Daily Amount: 30 mg  
  
 * amphetamine-dextroamphetamine XR 30 mg XR capsule Commonly known as:  ADDERALL XR Take 1 Cap (30 mg total) by mouth every morningIndications: ATTENTION-DEFICIT HYPERACTIVITY DISORDER Earliest Fill Date: 6/29/17. Max Daily Amount: 30 mg  
Start taking on:  6/29/2017 FLUoxetine 40 mg capsule Commonly known as:  PROzac Take 2 Caps by mouth daily. lisinopril 10 mg tablet Commonly known as:  Barbarann Plunk Take 1 Tab by mouth daily. triamterene-hydroCHLOROthiazide 37.5-25 mg per capsule Commonly known as:  Lesta Jaleesa TAKE ONE CAPSULE BY MOUTH DAILY FOR HYPERTENSION  Indications: hypertension * Notice: This list has 2 medication(s) that are the same as other medications prescribed for you. Read the directions carefully, and ask your doctor or other care provider to review them with you. Prescriptions Printed Refills FLUoxetine (PROZAC) 40 mg capsule 1 Sig: Take 2 Caps by mouth daily. Class: Print Route: Oral  
 amphetamine-dextroamphetamine XR (ADDERALL XR) 30 mg XR capsule 0 Sig: Take 1 Cap (30 mg total) by mouth every morningIndications: ATTENTION-DEFICIT HYPERACTIVITY DISORDER. Max Daily Amount: 30 mg  
 Class: Print Route: Oral  
 amphetamine-dextroamphetamine XR (ADDERALL XR) 30 mg XR capsule 0 Starting on: 6/29/2017 Sig: Take 1 Cap (30 mg total) by mouth every morningIndications: ATTENTION-DEFICIT HYPERACTIVITY DISORDER Earliest Fill Date: 6/29/17. Max Daily Amount: 30 mg  
 Class: Print Route: Oral  
  
Follow-up Instructions Return in about 2 months (around 7/30/2017). Introducing [facility]! Barbara Al introduces [employer] patient portal. Now you can access parts of your medical record, email your doctor's office, and request medication refills online. 1. In your internet browser, go to [email or web address] 2. Click on the First Time User? Click Here link in the Sign In box. You will see the New Member Sign Up page. 3. Enter your [employer] Access Code exactly as it appears below. You will not need to use this code after youve completed the sign-up process. If you do not sign up before the expiration date, you must request a new code. · [employer] Access Code: [email or web address] Expires: 6/26/2017  9:23 AM 
 
4. Enter the last four digits of your Social Security Number (xxxx) and Date of Birth (mm/dd/yyyy) as indicated and click Submit. You will be taken to the next sign-up page. 5. Create a [employer] Mobile ID. This will be your [employer] login ID and cannot be changed, so think of one that is secure and easy to remember. 6. Create a [employer] password. You can change your password at any time. 7. Enter your Password Reset Question and Answer. This can be used at a later time if you forget your password. 8. Enter your e-mail address. You will receive e-mail notification when new information is available in [address] 9. Click Sign Up. You can now view and download portions of your medical record. 10. Click the Download Summary menu link to download a portable copy of your medical information. If you have questions, please visit the Frequently Asked Questions section of the [employer] website. Remember, [employer] is NOT to be used for urgent needs. For medical emergencies, dial 911. Now available from your iPhone and Android! Please provide this summary of care documentation to your next provider. Your primary care clinician is listed as Kamilah Valadez. If you have any questions after today's visit, please call [phone].

## 2017-05-30 NOTE — PROGRESS NOTES
Psychiatric Progress Note    Date: 5/30/2017  Account Number:  [id number]  Name: Patricia Benson    Length of psychotherapy session: 15 minutes     Total Patient Care Time Spent: 20 minutes : (Coordinated care:  counseling time with patient, individual psychotherapy with patient; discussions with family members and chart review). SUBJECTIVE:   Patricia Benson  is a 63 y.o.  female  patient presents for a therapy/psychopharmacological management appointment. Pt reports doing the same, less distracted and less labile but still hyper and irritable and anxious in her presentation, states she gets overworked at times, can't say no to anyone who asks for help. Also having a lot of problems with her arthritis. Later acknowledged that she sometimes does not take the meds the right way, would forget the second dose of Adderall or will forget to take 2 capsules of Prozac and would take just one. Discussed the importance of med compliance, encouraged pt to keep the meds in med organizer to help with compliance. Recommended long acting Adderall to minimize the poor med compliance. Pt agreed. Patient denies SI/HI/SIB. No evidence of AH/VH or delusions.       Appetite:no change from normal   Sleep: no change     Response to Treatment: Positive   Side Effects: none      Supportive/Cognitive/Reality-Oriented psychotherapy provided in regards to psychosocial stressors:   pre-admission and current problems   Housing issues   Occupational issues   Academic issues   Legal issues   Medical issues   Interpersonal conflicts   Stress of hospitalization  Psychoeducation provided  Treatment plan reviewed with patient-including diagnosis and medications  Worked on issues of denial & effects of substance dependency/use      OBJECTIVE:                 Mental Status exam: WNL except for      Sensorium  oriented to time, place and person   Relations cooperative    Eye Contact    appropriate   Appearance:  age appropriate, casually dressed and groomed, thin    Motor Behavior:  hyperactive and within normal limits   Speech:  hyperverbal   Thought Process: loose associations and tangential   Thought Content free of delusions and free of hallucinations   Suicidal ideations none   Homicidal ideations none   Mood:  improve   Affect:  Still hyper   Memory recent  adequate   Memory remote:  adequate   Concentration:  adequate   Abstraction:  abstract   Insight:  fair   Reliability fair   Judgment:  fair       No Known Allergies     Current Outpatient Prescriptions   Medication Sig Dispense Refill    FLUoxetine (PROZAC) 40 mg capsule Take 2 Caps by mouth daily. 60 Cap 1    amphetamine-dextroamphetamine XR (ADDERALL XR) 30 mg XR capsule Take 1 Cap (30 mg total) by mouth every morningIndications: ATTENTION-DEFICIT HYPERACTIVITY DISORDER. Max Daily Amount: 30 mg 30 Cap 0    [START ON 6/29/2017] amphetamine-dextroamphetamine XR (ADDERALL XR) 30 mg XR capsule Take 1 Cap (30 mg total) by mouth every morningIndications: ATTENTION-DEFICIT HYPERACTIVITY DISORDER Earliest Fill Date: 6/29/17. Max Daily Amount: 30 mg 30 Cap 0    triamterene-hydroCHLOROthiazide (DYAZIDE) 37.5-25 mg per capsule TAKE ONE CAPSULE BY MOUTH DAILY FOR HYPERTENSION  Indications: hypertension 90 Cap 1    lisinopril (PRINIVIL, ZESTRIL) 10 mg tablet Take 1 Tab by mouth daily. 90 Tab 1        Medication Changes/Adjustments:   Medications Discontinued During This Encounter   Medication Reason    FLUoxetine (PROZAC) 20 mg capsule Not A Current Medication          ASSESSMENT:  Santo Pappas  is a 63 y.o.  female patient presented for her f/u appointment. Pt is responding positively to her meds, but having hard time complying with the meds, discussed the ways pt can improve med compliance, will switch her regular Adderall to extended release one. Seems like pt did not increase her Prozac to 80mg instead continued on 60mg daily dose.  Confirmed the current dose of Prozac 80mg daily with pt.    Diagnoses:   Axis I: Mood disorder  ADHD   Caffeine Dependence   Axis II: Deferred   Axis III: HTN, AVM, and Arthritis   Axis IV:Moderate  Axis V: 60-69    RECOMMENDATIONS/PLAN:   1. Medications: Medications reviewed, change Adderall to Adderall XR 30mg daily, continue Prozac 80mg daily, reinforced med compliance. Orders Placed This Encounter    FLUoxetine (PROZAC) 40 mg capsule    amphetamine-dextroamphetamine XR (ADDERALL XR) 30 mg XR capsule    amphetamine-dextroamphetamine XR (ADDERALL XR) 30 mg XR capsule      2. Follow-up Disposition:  Return in about 2 months (around 7/30/2017).

## 2017-07-31 ENCOUNTER — OFFICE VISIT (OUTPATIENT)
Dept: BEHAVIORAL/MENTAL HEALTH CLINIC | Age: 62
End: 2017-07-31

## 2017-07-31 DIAGNOSIS — F39 MOOD DISORDER (HCC): ICD-10-CM

## 2017-07-31 DIAGNOSIS — F90.2 ATTENTION DEFICIT HYPERACTIVITY DISORDER (ADHD), COMBINED TYPE: Primary | ICD-10-CM

## 2017-07-31 RX ORDER — DEXTROAMPHETAMINE SACCHARATE, AMPHETAMINE ASPARTATE MONOHYDRATE, DEXTROAMPHETAMINE SULFATE AND AMPHETAMINE SULFATE 7.5; 7.5; 7.5; 7.5 MG/1; MG/1; MG/1; MG/1
30 CAPSULE, EXTENDED RELEASE ORAL
Qty: 30 CAP | Refills: 0 | Status: SHIPPED | OUTPATIENT
Start: 2017-08-30 | End: 2017-09-29

## 2017-07-31 RX ORDER — FLUOXETINE HYDROCHLORIDE 40 MG/1
80 CAPSULE ORAL DAILY
Qty: 60 CAP | Refills: 1 | Status: SHIPPED | OUTPATIENT
Start: 2017-07-31 | End: 2017-10-02 | Stop reason: SDUPTHER

## 2017-07-31 RX ORDER — DEXTROAMPHETAMINE SACCHARATE, AMPHETAMINE ASPARTATE MONOHYDRATE, DEXTROAMPHETAMINE SULFATE AND AMPHETAMINE SULFATE 7.5; 7.5; 7.5; 7.5 MG/1; MG/1; MG/1; MG/1
30 CAPSULE, EXTENDED RELEASE ORAL
Qty: 30 CAP | Refills: 0 | Status: SHIPPED | OUTPATIENT
Start: 2017-07-31 | End: 2017-08-30

## 2017-07-31 RX ORDER — FLUOXETINE HYDROCHLORIDE 40 MG/1
80 CAPSULE ORAL DAILY
Qty: 60 CAP | Refills: 1 | Status: SHIPPED | OUTPATIENT
Start: 2017-07-31 | End: 2017-07-31 | Stop reason: SDUPTHER

## 2017-07-31 NOTE — MR AVS SNAPSHOT
Visit Information Date & Time Provider Department Dept. Phone Encounter #  
 7/31/2017 11:15 AM Kate Martinez MD [address] [phone] [id number] Follow-up Instructions Return in about 2 months (around 9/30/2017). Upcoming Health Maintenance Date Due COLONOSCOPY 8/22/1975 BREAST CANCER SCRN MAMMOGRAM 6/1/2017 ZOSTER VACCINE AGE 60> 6/22/2017 INFLUENZA AGE 9 TO ADULT 8/1/2017 DTaP/Tdap/Td series (2 - Td) 10/30/2025 Allergies as of 7/31/2017  Review Complete On: 7/31/2017 By: Kate Martinez MD  
 No Known Allergies Current Immunizations  Reviewed on 5/2/2017 Name Date Influenza Vaccine 11/5/2014 Influenza Vaccine (Quad) 10/30/2015 10:27 AM  
 Pneumococcal Polysaccharide (PPSV-23) 5/2/2017 Tdap 10/30/2015 10:27 AM  
  
 Not reviewed this visit You Were Diagnosed With   
  
 Codes Comments Attention deficit hyperactivity disorder (ADHD), combined type    -  Primary ICD-10-CM: F90.2 ICD-9-CM: 314.01 Mood disorder ([facility] [address]     ICD-10-CM: F39 
ICD-9-CM: 296.90 Vitals OB Status Smoking Status Hysterectomy Current Every Day Smoker Preferred Pharmacy Pharmacy Name Phone Jerardo Lopez  [facility] [phone] Your Updated Medication List  
  
   
This list is accurate as of: 7/31/17 11:47 AM.  Always use your most recent med list.  
  
  
  
  
 * amphetamine-dextroamphetamine XR 30 mg XR capsule Commonly known as:  ADDERALL XR Take 1 Cap (30 mg total) by mouth every morningIndications: ATTENTION-DEFICIT HYPERACTIVITY DISORDER. Max Daily Amount: 30 mg  
  
 * amphetamine-dextroamphetamine XR 30 mg XR capsule Commonly known as:  ADDERALL XR Take 1 Cap (30 mg total) by mouth every morningIndications: ATTENTION-DEFICIT HYPERACTIVITY DISORDER Earliest Fill Date: 8/30/17.   Max Daily Amount: 30 mg  
 Start taking on:  8/30/2017 FLUoxetine 40 mg capsule Commonly known as:  PROzac Take 2 Caps by mouth daily. Indications: ANXIETY WITH DEPRESSION  
  
 lisinopril 10 mg tablet Commonly known as:  Linda Drought Take 1 Tab by mouth daily. triamterene-hydroCHLOROthiazide 37.5-25 mg per capsule Commonly known as:  Vitale Orinemaker TAKE ONE CAPSULE BY MOUTH DAILY FOR HYPERTENSION  Indications: hypertension * Notice: This list has 2 medication(s) that are the same as other medications prescribed for you. Read the directions carefully, and ask your doctor or other care provider to review them with you. Prescriptions Printed Refills  
 amphetamine-dextroamphetamine XR (ADDERALL XR) 30 mg XR capsule 0 Sig: Take 1 Cap (30 mg total) by mouth every morningIndications: ATTENTION-DEFICIT HYPERACTIVITY DISORDER. Max Daily Amount: 30 mg  
 Class: Print Route: Oral  
 amphetamine-dextroamphetamine XR (ADDERALL XR) 30 mg XR capsule 0 Starting on: 8/30/2017 Sig: Take 1 Cap (30 mg total) by mouth every morningIndications: ATTENTION-DEFICIT HYPERACTIVITY DISORDER Earliest Fill Date: 8/30/17. Max Daily Amount: 30 mg  
 Class: Print Route: Oral  
 FLUoxetine (PROZAC) 40 mg capsule 1 Sig: Take 2 Caps by mouth daily. Indications: ANXIETY WITH DEPRESSION Class: Print Route: Oral  
  
Follow-up Instructions Return in about 2 months (around 9/30/2017). Introducing [facility]! Benedicto Preston introduces Last Second Tickets patient portal. Now you can access parts of your medical record, email your doctor's office, and request medication refills online. 1. In your internet browser, go to [email or web address] 2. Click on the First Time User? Click Here link in the Sign In box. You will see the New Member Sign Up page. 3. Enter your Last Second Tickets Access Code exactly as it appears below.  You will not need to use this code after youve completed the sign-up process. If you do not sign up before the expiration date, you must request a new code. · PawSpot Access Code: [email or web address] Expires: 10/29/2017 11:40 AM 
 
4. Enter the last four digits of your Social Security Number (xxxx) and Date of Birth (mm/dd/yyyy) as indicated and click Submit. You will be taken to the next sign-up page. 5. Create a PawSpot ID. This will be your PawSpot login ID and cannot be changed, so think of one that is secure and easy to remember. 6. Create a PawSpot password. You can change your password at any time. 7. Enter your Password Reset Question and Answer. This can be used at a later time if you forget your password. 8. Enter your e-mail address. You will receive e-mail notification when new information is available in [address] 9. Click Sign Up. You can now view and download portions of your medical record. 10. Click the Download Summary menu link to download a portable copy of your medical information. If you have questions, please visit the Frequently Asked Questions section of the PawSpot website. Remember, PawSpot is NOT to be used for urgent needs. For medical emergencies, dial 911. Now available from your iPhone and Android! Please provide this summary of care documentation to your next provider. Your primary care clinician is listed as [address]. If you have any questions after today's visit, please call [phone].

## 2017-08-02 ENCOUNTER — OFFICE VISIT (OUTPATIENT)
Dept: INTERNAL MEDICINE CLINIC | Age: 62
End: 2017-08-02

## 2017-08-02 VITALS
BODY MASS INDEX: 16.83 KG/M2 | WEIGHT: 101 LBS | RESPIRATION RATE: 18 BRPM | OXYGEN SATURATION: 98 % | DIASTOLIC BLOOD PRESSURE: 96 MMHG | TEMPERATURE: 98.1 F | HEIGHT: 65 IN | HEART RATE: 97 BPM | SYSTOLIC BLOOD PRESSURE: 138 MMHG

## 2017-08-02 DIAGNOSIS — M77.8 TENDONITIS OF ELBOW, RIGHT: Primary | ICD-10-CM

## 2017-08-02 NOTE — PATIENT INSTRUCTIONS
Tendon Injury (Tendinopathy): Care Instructions  Your Care Instructions  Tendons are tough, flexible tissues that connect muscle to bone. A tendon can hurt or get torn from overuse or aging, especially tendons in the shoulder, elbow, wrist, hip, knee, or ankle. Tendon injuries may be called tendinopathy or tendinitis. Tendon injuries can occur from any motion you have to repeat in a job, sports, or daily activities. Tennis elbow is one common tendon injury. You can treat most tendon problems with over-the-counter pain medicine, rest, changes in your activities, and physical therapy. Follow-up care is a key part of your treatment and safety. Be sure to make and go to all appointments, and call your doctor if you are having problems. Its also a good idea to know your test results and keep a list of the medicines you take. How can you care for yourself at home? · Rest the sore area. You may have to stop doing the activity that caused the tendon pain for a while. · Take an over-the-counter pain medicine, such as acetaminophen (Tylenol), ibuprofen (Advil, Motrin), or naproxen (Aleve). Read and follow all instructions on the label. · Do not take two or more pain medicines at the same time unless the doctor told you to. Many pain medicines have acetaminophen, which is Tylenol. Too much acetaminophen (Tylenol) can be harmful. · Put ice or a cold pack on the sore area for 10 to 20 minutes at a time. Try to do this every 1 to 2 hours for the next 3 days (when you are awake) or until any swelling goes down. Put a thin cloth between the ice and your skin. · Prop up the sore area on a pillow when you ice it or anytime you sit or lie down during the next 3 days. Try to keep it above the level of your heart. This will help reduce swelling.   · Follow your doctor's advice for wearing and caring for a sling, splint, or cast. In some cases, you may wear one of these for a while to help your tendon heal.  · Follow your doctor's advice for stretching and physical therapy. Gently move your joint through its full range of motion. This will prevent stiffness in your joint. · Go back to your activity slowly. Warm up before and stretch after the activity. You also can try making some changes. For example, if a sport caused your tendon pain, alternate the sport with another activity. If using a tool causes pain, switch hands or change your . Stop the activity if it hurts. After the activity, apply ice to prevent pain and swelling. · Do not smoke. Smoking can slow healing. If you need help quitting, talk to your doctor about stop-smoking programs and medicines. These can increase your chances of quitting for good. When should you call for help? Watch closely for changes in your health, and be sure to contact your doctor if:  · Your pain gets worse. · You do not get better as expected. Where can you learn more? Go to http://farooq-heather.info/. Enter A157 in the search box to learn more about \"Tendon Injury (Tendinopathy): Care Instructions. \"  Current as of: March 21, 2017  Content Version: 11.3  © 8138-1812 [employer]. Care instructions adapted under license by [employer] (which disclaims liability or warranty for this information). If you have questions about a medical condition or this instruction, always ask your healthcare professional. [address] any warranty or liability for your use of this information.

## 2017-08-02 NOTE — MR AVS SNAPSHOT
Visit Information Date & Time Provider Department Dept. Phone Encounter #  
 8/2/2017 11:00 AM [facility] [phone] [id number] Your Appointments 10/2/2017 11:15 AM  
ESTABLISHED PATIENT with Ina Mesa MD  
[address] [facility]) Appt Note: 2 mo fu  
 [address] [facility] [address] [place] [address]  
[phone] [address] [place] [address] Upcoming Health Maintenance Date Due COLONOSCOPY 8/22/1975 BREAST CANCER SCRN MAMMOGRAM 6/1/2017 ZOSTER VACCINE AGE 60> 6/22/2017 INFLUENZA AGE 9 TO ADULT 8/1/2017 DTaP/Tdap/Td series (2 - Td) 10/30/2025 Allergies as of 8/2/2017  Review Complete On: 7/31/2017 By: Ina Mesa MD  
 No Known Allergies Current Immunizations  Reviewed on 5/2/2017 Name Date Influenza Vaccine 11/5/2014 Influenza Vaccine (Quad) 10/30/2015 10:27 AM  
 Pneumococcal Polysaccharide (PPSV-23) 5/2/2017 Tdap 10/30/2015 10:27 AM  
  
 Not reviewed this visit You Were Diagnosed With   
  
 Codes Comments Right elbow tendonitis    -  Primary ICD-10-CM: M77.8 ICD-9-CM: 727.09 Vitals BP Pulse Temp Resp Height(growth percentile) Weight(growth percentile) (!) 160/100 (BP 1 Location: Left arm, BP Patient Position: Sitting) 97 98.1 °F (36.7 °C) (Oral) 18 5' 5\" (1.651 m) 101 lb (45.8 kg) SpO2 BMI OB Status Smoking Status 98% 16.81 kg/m2 Hysterectomy Current Every Day Smoker Vitals History BMI and BSA Data Body Mass Index Body Surface Area  
 16.81 kg/m 2 1.45 m 2 Preferred Pharmacy Pharmacy Name Phone [facility] [phone] [facility]note 3[facility] [phone] Your Updated Medication List  
  
   
This list is accurate as of: 8/2/17 11:33 AM.  Always use your most recent med list.  
  
  
  
  
 * amphetamine-dextroamphetamine XR 30 mg XR capsule Commonly known as:  ADDERALL XR Take 1 Cap (30 mg total) by mouth every morningIndications: ATTENTION-DEFICIT HYPERACTIVITY DISORDER. Max Daily Amount: 30 mg  
  
 * amphetamine-dextroamphetamine XR 30 mg XR capsule Commonly known as:  ADDERALL XR Take 1 Cap (30 mg total) by mouth every morningIndications: ATTENTION-DEFICIT HYPERACTIVITY DISORDER Earliest Fill Date: 8/30/17. Max Daily Amount: 30 mg  
Start taking on:  8/30/2017 FLUoxetine 40 mg capsule Commonly known as:  PROzac Take 2 Caps by mouth daily. Indications: ANXIETY WITH DEPRESSION  
  
 lisinopril 10 mg tablet Commonly known as:  Helon Estrin Take 1 Tab by mouth daily. triamterene-hydroCHLOROthiazide 37.5-25 mg per capsule Commonly known as:  [place] TAKE ONE CAPSULE BY MOUTH DAILY FOR HYPERTENSION  Indications: hypertension * Notice: This list has 2 medication(s) that are the same as other medications prescribed for you. Read the directions carefully, and ask your doctor or other care provider to review them with you. Patient Instructions Tendon Injury (Tendinopathy): Care Instructions Your Care Instructions Tendons are tough, flexible tissues that connect muscle to bone. A tendon can hurt or get torn from overuse or aging, especially tendons in the shoulder, elbow, wrist, hip, knee, or ankle. Tendon injuries may be called tendinopathy or tendinitis. Tendon injuries can occur from any motion you have to repeat in a job, sports, or daily activities. Tennis elbow is one common tendon injury. You can treat most tendon problems with over-the-counter pain medicine, rest, changes in your activities, and physical therapy. Follow-up care is a key part of your treatment and safety. Be sure to make and go to all appointments, and call your doctor if you are having problems.  Its also a good idea to know your test results and keep a list of the medicines you take. How can you care for yourself at home? · Rest the sore area. You may have to stop doing the activity that caused the tendon pain for a while. · Take an over-the-counter pain medicine, such as acetaminophen (Tylenol), ibuprofen (Advil, Motrin), or naproxen (Aleve). Read and follow all instructions on the label. · Do not take two or more pain medicines at the same time unless the doctor told you to. Many pain medicines have acetaminophen, which is Tylenol. Too much acetaminophen (Tylenol) can be harmful. · Put ice or a cold pack on the sore area for 10 to 20 minutes at a time. Try to do this every 1 to 2 hours for the next 3 days (when you are awake) or until any swelling goes down. Put a thin cloth between the ice and your skin. · Prop up the sore area on a pillow when you ice it or anytime you sit or lie down during the next 3 days. Try to keep it above the level of your heart. This will help reduce swelling. · Follow your doctor's advice for wearing and caring for a sling, splint, or cast. In some cases, you may wear one of these for a while to help your tendon heal. 
· Follow your doctor's advice for stretching and physical therapy. Gently move your joint through its full range of motion. This will prevent stiffness in your joint. · Go back to your activity slowly. Warm up before and stretch after the activity. You also can try making some changes. For example, if a sport caused your tendon pain, alternate the sport with another activity. If using a tool causes pain, switch hands or change your . Stop the activity if it hurts. After the activity, apply ice to prevent pain and swelling. · Do not smoke. Smoking can slow healing. If you need help quitting, talk to your doctor about stop-smoking programs and medicines. These can increase your chances of quitting for good. When should you call for help?  
Watch closely for changes in your health, and be sure to contact your doctor if: 
· Your pain gets worse. · You do not get better as expected. Where can you learn more? Go to http://farooq-heather.info/. Enter A157 in the search box to learn more about \"Tendon Injury (Tendinopathy): Care Instructions. \" Current as of: March 21, 2017 Content Version: 11.3 © 6761-3823 [employer]eyos. Care instructions adapted under license by [employer] (which disclaims liability or warranty for this information). If you have questions about a medical condition or this instruction, always ask your healthcare professional. [address] any warranty or liability for your use of this information. Introducing [facility]! Chas Gomez introduces Dealdrive patient portal. Now you can access parts of your medical record, email your doctor's office, and request medication refills online. 1. In your internet browser, go to [email or web address] 2. Click on the First Time User? Click Here link in the Sign In box. You will see the New Member Sign Up page. 3. Enter your Dealdrive Access Code exactly as it appears below. You will not need to use this code after youve completed the sign-up process. If you do not sign up before the expiration date, you must request a new code. · Dealdrive Access Code: [email or web address] Expires: 10/29/2017 11:40 AM 
 
4. Enter the last four digits of your Social Security Number (xxxx) and Date of Birth (mm/dd/yyyy) as indicated and click Submit. You will be taken to the next sign-up page. 5. Create a CBLPatht ID. This will be your Dealdrive login ID and cannot be changed, so think of one that is secure and easy to remember. 6. Create a Dealdrive password. You can change your password at any time. 7. Enter your Password Reset Question and Answer. This can be used at a later time if you forget your password. 8. Enter your e-mail address.  You will receive e-mail notification when new information is available in Hunie. 9. Click Sign Up. You can now view and download portions of your medical record. 10. Click the Download Summary menu link to download a portable copy of your medical information. If you have questions, please visit the Frequently Asked Questions section of the Hunie website. Remember, Hunie is NOT to be used for urgent needs. For medical emergencies, dial 911. Now available from your iPhone and Android! Please provide this summary of care documentation to your next provider. Your primary care clinician is listed as Tori Arias. If you have any questions after today's visit, please call [phone].

## 2017-08-02 NOTE — PROGRESS NOTES
Chief Complaint   Patient presents with    Pain (Chronic)     elbow        1. Have you been to the ER, urgent care clinic since your last visit? No Hospitalized since your last visit? No    2. Have you seen or consulted any other health care providers outside of the [address] [place] [address] since your last visit? No  Include any pap smears or colon screening.  No

## 2017-08-02 NOTE — PROGRESS NOTES
HISTORY OF PRESENT ILLNESS  Norberto Sommers is a 63 y.o. female. Patient reports right elbow pain x 3 days since helping a family member move the day before. She was pushing, pulling, and lifting many heavy objects. Ice and aleve seem to help. Pain is worse when she tries to lift things today. Visit Vitals    BP (!) 138/96    Pulse 97    Temp 98.1 °F (36.7 °C) (Oral)    Resp 18    Ht 5' 5\" (1.651 m)    Wt 101 lb (45.8 kg)    SpO2 98%    BMI 16.81 kg/m2       Elbow Pain    The history is provided by the patient. This is a new problem. Episode onset: 3 days. The problem occurs constantly. The problem has not changed since onset. The pain is present in the right elbow. Pertinent negatives include full range of motion. Treatments tried: nsaids, ice. The treatment provided moderate relief. Review of Systems   Musculoskeletal:        Right elbow pain       Physical Exam   Constitutional: She is oriented to person, place, and time. She appears well-developed and well-nourished. Musculoskeletal:   Tenderness of region proximal to right lateral epicondyle near tricep tendon; no swelling or redness, no bruising; pain worsens with flexion and extension of arm   Neurological: She is alert and oriented to person, place, and time. Skin: Skin is warm and dry. ASSESSMENT and PLAN    ICD-10-CM ICD-9-CM    1. Tendonitis of elbow, right M77.8 727.09      No orders of the defined types were placed in this encounter.   apply sling or brace over the next week  Take 1 aleve twice daily with food for the next 5 days  Apply ice for 15 minutes every 2-3 hours  No heavy lifting

## 2017-08-03 NOTE — PROGRESS NOTES
Psychiatric Progress Note    Date: 7/31/2017  Account Number:  [id number]  Name: Enio Patricia    Length of psychotherapy session: 15 minutes     Total Patient Care Time Spent: 20 minutes : (Coordinated care:  counseling time with patient, individual psychotherapy with patient; discussions with family members and chart review). SUBJECTIVE:   Enio Patricia  is a 63 y.o.  female  patient presents for a therapy/psychopharmacological management appointment. Pt reports doing much better with medication change/adjustment, compliance is improved, mood is improved, less agitated, easy to talk to. Focus and concentration improved. Her father in law moved to [place] recently, stays busy with him and also takes care of her grandchildren. Works off and on on part time basis. Pt was pleasant during this visit. Patient denies SI/HI/SIB. No evidence of AH/VH or delusions.       Appetite:no change from normal   Sleep: no change     Response to Treatment: Positive   Side Effects: none      Supportive/Cognitive/Reality-Oriented psychotherapy provided in regards to psychosocial stressors:   pre-admission and current problems   Housing issues   Occupational issues   Academic issues   Legal issues   Medical issues   Interpersonal conflicts   Stress of hospitalization  Psychoeducation provided  Treatment plan reviewed with patient-including diagnosis and medications  Worked on issues of denial & effects of substance dependency/use      OBJECTIVE:                 Mental Status exam: WNL except for      Sensorium  oriented to time, place and person   Relations cooperative    Eye Contact    appropriate   Appearance:  age appropriate, casually dressed and groomed, thin    Motor Behavior:  hyperactive and within normal limits   Speech:  hyperverbal   Thought Process: loose associations and tangential   Thought Content free of delusions and free of hallucinations   Suicidal ideations none   Homicidal ideations none   Mood:  improved Affect:  Mood congruent/pleasant    Memory recent  adequate   Memory remote:  adequate   Concentration:  adequate   Abstraction:  abstract   Insight:  fair   Reliability fair   Judgment:  fair       No Known Allergies     Current Outpatient Prescriptions   Medication Sig Dispense Refill    amphetamine-dextroamphetamine XR (ADDERALL XR) 30 mg XR capsule Take 1 Cap (30 mg total) by mouth every morningIndications: ATTENTION-DEFICIT HYPERACTIVITY DISORDER. Max Daily Amount: 30 mg 30 Cap 0    [START ON 8/30/2017] amphetamine-dextroamphetamine XR (ADDERALL XR) 30 mg XR capsule Take 1 Cap (30 mg total) by mouth every morningIndications: ATTENTION-DEFICIT HYPERACTIVITY DISORDER Earliest Fill Date: 8/30/17. Max Daily Amount: 30 mg 30 Cap 0    FLUoxetine (PROZAC) 40 mg capsule Take 2 Caps by mouth daily. Indications: ANXIETY WITH DEPRESSION 60 Cap 1    triamterene-hydroCHLOROthiazide (DYAZIDE) 37.5-25 mg per capsule TAKE ONE CAPSULE BY MOUTH DAILY FOR HYPERTENSION  Indications: hypertension 90 Cap 1    lisinopril (PRINIVIL, ZESTRIL) 10 mg tablet Take 1 Tab by mouth daily. 90 Tab 1        Medication Changes/Adjustments:   Medications Discontinued During This Encounter   Medication Reason    FLUoxetine (PROZAC) 40 mg capsule Reorder    FLUoxetine (PROZAC) 40 mg capsule Reorder          ASSESSMENT:  Jeremy Garces  is a 63 y.o.  female patient presented for her f/u appointment. Pt is responding well to extended release Adderall, compliance is improved, taking Prozac as directed, mood is improved. Diagnoses:   Axis I: Mood disorder  ADHD   Caffeine Dependence   Axis II: Deferred   Axis III: HTN, AVM, and Arthritis   Axis IV:Moderate  Axis V: 60-69    RECOMMENDATIONS/PLAN:   1. Medications: Medications reviewed, continue with the current meds.      Orders Placed This Encounter    DISCONTD: FLUoxetine (PROZAC) 40 mg capsule    amphetamine-dextroamphetamine XR (ADDERALL XR) 30 mg XR capsule    amphetamine-dextroamphetamine XR (ADDERALL XR) 30 mg XR capsule    FLUoxetine (PROZAC) 40 mg capsule      2. Follow-up Disposition:  Return in about 2 months (around 9/30/2017).

## 2017-10-02 ENCOUNTER — OFFICE VISIT (OUTPATIENT)
Dept: BEHAVIORAL/MENTAL HEALTH CLINIC | Age: 62
End: 2017-10-02

## 2017-10-02 VITALS
HEART RATE: 87 BPM | HEIGHT: 65 IN | BODY MASS INDEX: 16.66 KG/M2 | WEIGHT: 100 LBS | SYSTOLIC BLOOD PRESSURE: 163 MMHG | DIASTOLIC BLOOD PRESSURE: 84 MMHG

## 2017-10-02 DIAGNOSIS — F39 MOOD DISORDER (HCC): Primary | ICD-10-CM

## 2017-10-02 DIAGNOSIS — F90.2 ATTENTION DEFICIT HYPERACTIVITY DISORDER (ADHD), COMBINED TYPE: ICD-10-CM

## 2017-10-02 RX ORDER — FLUOXETINE HYDROCHLORIDE 40 MG/1
80 CAPSULE ORAL DAILY
Qty: 60 CAP | Refills: 1 | Status: SHIPPED | OUTPATIENT
Start: 2017-10-02 | End: 2018-01-09 | Stop reason: SDUPTHER

## 2017-10-02 RX ORDER — DEXTROAMPHETAMINE SACCHARATE, AMPHETAMINE ASPARTATE MONOHYDRATE, DEXTROAMPHETAMINE SULFATE AND AMPHETAMINE SULFATE 7.5; 7.5; 7.5; 7.5 MG/1; MG/1; MG/1; MG/1
30 CAPSULE, EXTENDED RELEASE ORAL
Qty: 30 CAP | Refills: 0 | Status: SHIPPED | OUTPATIENT
Start: 2017-10-02 | End: 2018-01-09 | Stop reason: SDUPTHER

## 2017-10-02 RX ORDER — DEXTROAMPHETAMINE SACCHARATE, AMPHETAMINE ASPARTATE MONOHYDRATE, DEXTROAMPHETAMINE SULFATE AND AMPHETAMINE SULFATE 7.5; 7.5; 7.5; 7.5 MG/1; MG/1; MG/1; MG/1
30 CAPSULE, EXTENDED RELEASE ORAL
Qty: 30 CAP | Refills: 0 | Status: SHIPPED | OUTPATIENT
Start: 2017-11-01 | End: 2017-12-01

## 2017-10-02 RX ORDER — DEXTROAMPHETAMINE SACCHARATE, AMPHETAMINE ASPARTATE MONOHYDRATE, DEXTROAMPHETAMINE SULFATE AND AMPHETAMINE SULFATE 7.5; 7.5; 7.5; 7.5 MG/1; MG/1; MG/1; MG/1
30 CAPSULE, EXTENDED RELEASE ORAL
COMMUNITY
End: 2017-10-02 | Stop reason: SDUPTHER

## 2017-10-02 RX ORDER — DEXTROAMPHETAMINE SACCHARATE, AMPHETAMINE ASPARTATE MONOHYDRATE, DEXTROAMPHETAMINE SULFATE AND AMPHETAMINE SULFATE 7.5; 7.5; 7.5; 7.5 MG/1; MG/1; MG/1; MG/1
30 CAPSULE, EXTENDED RELEASE ORAL
Qty: 30 CAP | Refills: 0 | Status: SHIPPED | OUTPATIENT
Start: 2017-12-01 | End: 2017-12-31

## 2017-10-02 NOTE — PROGRESS NOTES
Psychiatric Progress Note    Date: 10/2/2017  Account Number:  [id number]  Name: Eve Encarnacion    Length of psychotherapy session: 15 minutes     Total Patient Care Time Spent: 20 minutes : (Coordinated care:  counseling time with patient, individual psychotherapy with patient; discussions with family members and chart review). SUBJECTIVE:   Eve Encarnacion  is a 63 y.o.  female  patient presents for a therapy/psychopharmacological management appointment. Pt reports overall doing well, though does c/o feeling tired. Taking care of her elderly father in law, going through the stress of her son and his girl friend. Also recently learnt about conflicts b/w her daughter and son in law who was found to be cheating on her daughter. Currently both her daughter and son in law are going for marital counseling, pt is trying to help them by baby sitting her grandchildren. Pt also reports being pulled by her friends for help at times. Discussed with pt the need to bring balance in her life. Pt verbalized understanding. Pt reports taking meds as prescribed. Patient denies SI/HI/SIB. No evidence of AH/VH or delusions.       Appetite:no change from normal   Sleep: no change     Response to Treatment: Positive   Side Effects: none      Supportive/Cognitive/Reality-Oriented psychotherapy provided in regards to psychosocial stressors:   pre-admission and current problems   Housing issues   Occupational issues   Academic issues   Legal issues   Medical issues   Interpersonal conflicts   Stress of hospitalization  Psychoeducation provided  Treatment plan reviewed with patient-including diagnosis and medications  Worked on issues of denial & effects of substance dependency/use      OBJECTIVE:                 Mental Status exam: WNL except for      Sensorium  oriented to time, place and person   Relations cooperative    Eye Contact    appropriate   Appearance:  age appropriate, casually dressed and groomed, thin    Motor Behavior: hyperactive and within normal limits   Speech:  hyperverbal   Thought Process: loose associations and tangential   Thought Content free of delusions and free of hallucinations   Suicidal ideations none   Homicidal ideations none   Mood:  Stable/anxious/stressed   Affect:  Mood congruent   Memory recent  adequate   Memory remote:  adequate   Concentration:  adequate   Abstraction:  abstract   Insight:  fair   Reliability fair   Judgment:  fair       No Known Allergies     Current Outpatient Prescriptions   Medication Sig Dispense Refill    amphetamine-dextroamphetamine XR (ADDERALL XR) 30 mg XR capsule Take 1 Cap (30 mg total) by mouth every morningIndications: ATTENTION-DEFICIT HYPERACTIVITY DISORDER. Max Daily Amount: 30 mg 30 Cap 0    FLUoxetine (PROZAC) 40 mg capsule Take 2 Caps by mouth daily. Indications: ANXIETY WITH DEPRESSION 60 Cap 1    [START ON 11/1/2017] amphetamine-dextroamphetamine XR (ADDERALL XR) 30 mg XR capsule Take 1 Cap (30 mg total) by mouth every morningIndications: ATTENTION-DEFICIT HYPERACTIVITY DISORDER Earliest Fill Date: 11/1/17. Max Daily Amount: 30 mg 30 Cap 0    [START ON 12/1/2017] amphetamine-dextroamphetamine XR (ADDERALL XR) 30 mg XR capsule Take 1 Cap (30 mg total) by mouth every morningIndications: ATTENTION-DEFICIT HYPERACTIVITY DISORDER Earliest Fill Date: 12/1/17. Max Daily Amount: 30 mg 30 Cap 0    triamterene-hydroCHLOROthiazide (DYAZIDE) 37.5-25 mg per capsule TAKE ONE CAPSULE BY MOUTH DAILY FOR HYPERTENSION  Indications: hypertension 90 Cap 1    lisinopril (PRINIVIL, ZESTRIL) 10 mg tablet Take 1 Tab by mouth daily. 90 Tab 1        Medication Changes/Adjustments:   Medications Discontinued During This Encounter   Medication Reason    amphetamine-dextroamphetamine XR (ADDERALL XR) 30 mg XR capsule Reorder    FLUoxetine (PROZAC) 40 mg capsule Reorder          ASSESSMENT:  Brandee Collier  is a 63 y.o.  female patient presented for her f/u appointment.  Pt is responding well to the meds, going through family issues which bring a lot of stress, provided support to pt. Diagnoses:   Axis I: Mood disorder  ADHD   Caffeine Dependence   Axis II: Deferred   Axis III: HTN, AVM, and Arthritis   Axis IV:Moderate  Axis V: 60-69    RECOMMENDATIONS/PLAN:   1. Medications: Medications reviewed, continue with the current meds. Orders Placed This Encounter    DISCONTD: amphetamine-dextroamphetamine XR (ADDERALL XR) 30 mg XR capsule    amphetamine-dextroamphetamine XR (ADDERALL XR) 30 mg XR capsule    FLUoxetine (PROZAC) 40 mg capsule    amphetamine-dextroamphetamine XR (ADDERALL XR) 30 mg XR capsule    amphetamine-dextroamphetamine XR (ADDERALL XR) 30 mg XR capsule      2. Follow-up Disposition:  Return in about 3 months (around 1/2/2018).

## 2017-10-02 NOTE — MR AVS SNAPSHOT
Visit Information Date & Time Provider Department Dept. Phone Encounter #  
 10/2/2017 11:15 AM Tramaine Swartz MD [address] [phone] [id number] Follow-up Instructions Return in about 3 months (around 1/2/2018). Upcoming Health Maintenance Date Due COLONOSCOPY 8/22/1975 BREAST CANCER SCRN MAMMOGRAM 6/1/2017 ZOSTER VACCINE AGE 60> 6/22/2017 INFLUENZA AGE 9 TO ADULT 8/1/2017 DTaP/Tdap/Td series (2 - Td) 10/30/2025 Allergies as of 10/2/2017  Review Complete On: 10/2/2017 By: Tramaine Swartz MD  
 No Known Allergies Current Immunizations  Reviewed on 5/2/2017 Name Date Influenza Vaccine 11/5/2014 Influenza Vaccine (Quad) 10/30/2015 10:27 AM  
 Pneumococcal Polysaccharide (PPSV-23) 5/2/2017 Tdap 10/30/2015 10:27 AM  
  
 Not reviewed this visit You Were Diagnosed With   
  
 Codes Comments [facility] disorder ([facility]    -  Primary ICD-10-CM: F39 
ICD-9-CM: 296.90 Attention deficit hyperactivity disorder (ADHD), combined type     ICD-10-CM: F90.2 ICD-9-CM: 314.01 Vitals BP Pulse Height(growth percentile) Weight(growth percentile) BMI OB Status 163/84 87 5' 5\" (1.651 m) 100 lb (45.4 kg) 16.64 kg/m2 Hysterectomy Smoking Status Current Every Day Smoker Vitals History BMI and BSA Data Body Mass Index Body Surface Area  
 16.64 kg/m 2 1.44 m 2 Preferred Pharmacy Pharmacy Name Phone [facility] [address] [place] [address] [address] [place] [address] [facility]. [phone] Your Updated Medication List  
  
   
This list is accurate as of: 10/2/17 11:42 AM.  Always use your most recent med list.  
  
  
  
  
 * amphetamine-dextroamphetamine XR 30 mg XR capsule Commonly known as:  ADDERALL XR Take 1 Cap (30 mg total) by mouth every morningIndications: ATTENTION-DEFICIT HYPERACTIVITY DISORDER.   Max Daily Amount: 30 mg  
  
 * amphetamine-dextroamphetamine XR 30 mg XR capsule Commonly known as:  ADDERALL XR Take 1 Cap (30 mg total) by mouth every morningIndications: ATTENTION-DEFICIT HYPERACTIVITY DISORDER Earliest Fill Date: 11/1/17. Max Daily Amount: 30 mg  
Start taking on:  11/1/2017 * amphetamine-dextroamphetamine XR 30 mg XR capsule Commonly known as:  ADDERALL XR Take 1 Cap (30 mg total) by mouth every morningIndications: ATTENTION-DEFICIT HYPERACTIVITY DISORDER Earliest Fill Date: 12/1/17. Max Daily Amount: 30 mg  
Start taking on:  12/1/2017 FLUoxetine 40 mg capsule Commonly known as:  PROzac Take 2 Caps by mouth daily. Indications: ANXIETY WITH DEPRESSION  
  
 lisinopril 10 mg tablet Commonly known as:  Terra Reasons Take 1 Tab by mouth daily. triamterene-hydroCHLOROthiazide 37.5-25 mg per capsule Commonly known as:  Verlene Lard TAKE ONE CAPSULE BY MOUTH DAILY FOR HYPERTENSION  Indications: hypertension * Notice: This list has 3 medication(s) that are the same as other medications prescribed for you. Read the directions carefully, and ask your doctor or other care provider to review them with you. Prescriptions Printed Refills  
 amphetamine-dextroamphetamine XR (ADDERALL XR) 30 mg XR capsule 0 Sig: Take 1 Cap (30 mg total) by mouth every morningIndications: ATTENTION-DEFICIT HYPERACTIVITY DISORDER. Max Daily Amount: 30 mg  
 Class: Print Route: Oral  
 amphetamine-dextroamphetamine XR (ADDERALL XR) 30 mg XR capsule 0 Starting on: 11/1/2017 Sig: Take 1 Cap (30 mg total) by mouth every morningIndications: ATTENTION-DEFICIT HYPERACTIVITY DISORDER Earliest Fill Date: 11/1/17. Max Daily Amount: 30 mg  
 Class: Print Route: Oral  
 amphetamine-dextroamphetamine XR (ADDERALL XR) 30 mg XR capsule 0 Starting on: 12/1/2017  Sig: Take 1 Cap (30 mg total) by mouth every morningIndications: ATTENTION-DEFICIT HYPERACTIVITY DISORDER Earliest Fill Date: 12/1/17. Max Daily Amount: 30 mg  
 Class: Print Route: Oral  
  
Prescriptions Sent to Pharmacy Refills FLUoxetine (PROZAC) 40 mg capsule 1 Sig: Take 2 Caps by mouth daily. Indications: ANXIETY WITH DEPRESSION Class: Normal  
 Pharmacy: [facility] [place], [address] Ph #: [phone] Route: Oral  
  
Follow-up Instructions Return in about 3 months (around 1/2/2018). Introducing [facility]! Annmarie Berg introduces [employer] patient portal. Now you can access parts of your medical record, email your doctor's office, and request medication refills online. 1. In your internet browser, go to [email or web address] 2. Click on the First Time User? Click Here link in the Sign In box. You will see the New Member Sign Up page. 3. Enter your [employer] Access Code exactly as it appears below. You will not need to use this code after youve completed the sign-up process. If you do not sign up before the expiration date, you must request a new code. · [employer] Access Code: [email or web address] Expires: 10/29/2017 11:40 AM 
 
4. Enter the last four digits of your Social Security Number (xxxx) and Date of Birth (mm/dd/yyyy) as indicated and click Submit. You will be taken to the next sign-up page. 5. Create a [employer] ID. This will be your [employer] login ID and cannot be changed, so think of one that is secure and easy to remember. 6. Create a [employer] password. You can change your password at any time. 7. Enter your Password Reset Question and Answer. This can be used at a later time if you forget your password. 8. Enter your e-mail address. You will receive e-mail notification when new information is available in [address] 9. Click Sign Up. You can now view and download portions of your medical record. 10. Click the Download Summary menu link to download a portable copy of your medical information. If you have questions, please visit the Frequently Asked Questions section of the [employer] website. Remember, [employer] is NOT to be used for urgent needs. For medical emergencies, dial 911. Now available from your iPhone and Android! Please provide this summary of care documentation to your next provider. Your primary care clinician is listed as [address]. If you have any questions after today's visit, please call [phone].

## 2018-01-09 ENCOUNTER — OFFICE VISIT (OUTPATIENT)
Dept: BEHAVIORAL/MENTAL HEALTH CLINIC | Age: 63
End: 2018-01-09

## 2018-01-09 VITALS
DIASTOLIC BLOOD PRESSURE: 89 MMHG | HEART RATE: 90 BPM | BODY MASS INDEX: 17.49 KG/M2 | SYSTOLIC BLOOD PRESSURE: 158 MMHG | WEIGHT: 105 LBS | HEIGHT: 65 IN

## 2018-01-09 DIAGNOSIS — F39 MOOD DISORDER (HCC): Primary | ICD-10-CM

## 2018-01-09 DIAGNOSIS — F41.9 ANXIETY DISORDER, UNSPECIFIED TYPE: ICD-10-CM

## 2018-01-09 DIAGNOSIS — F90.2 ATTENTION DEFICIT HYPERACTIVITY DISORDER (ADHD), COMBINED TYPE: ICD-10-CM

## 2018-01-09 PROBLEM — F33.9 RECURRENT DEPRESSION (HCC): Status: ACTIVE | Noted: 2018-01-09

## 2018-01-09 RX ORDER — FLUOXETINE HYDROCHLORIDE 40 MG/1
80 CAPSULE ORAL DAILY
Qty: 60 CAP | Refills: 1 | Status: SHIPPED | OUTPATIENT
Start: 2018-01-09 | End: 2018-03-30 | Stop reason: SDUPTHER

## 2018-01-09 RX ORDER — DEXTROAMPHETAMINE SACCHARATE, AMPHETAMINE ASPARTATE MONOHYDRATE, DEXTROAMPHETAMINE SULFATE AND AMPHETAMINE SULFATE 7.5; 7.5; 7.5; 7.5 MG/1; MG/1; MG/1; MG/1
30 CAPSULE, EXTENDED RELEASE ORAL
Qty: 30 CAP | Refills: 0 | Status: SHIPPED | OUTPATIENT
Start: 2018-01-09 | End: 2018-03-20 | Stop reason: ALTCHOICE

## 2018-01-09 RX ORDER — DEXTROAMPHETAMINE SACCHARATE, AMPHETAMINE ASPARTATE MONOHYDRATE, DEXTROAMPHETAMINE SULFATE AND AMPHETAMINE SULFATE 7.5; 7.5; 7.5; 7.5 MG/1; MG/1; MG/1; MG/1
30 CAPSULE, EXTENDED RELEASE ORAL
Qty: 30 CAP | Refills: 0 | Status: SHIPPED | OUTPATIENT
Start: 2018-03-10 | End: 2018-03-30 | Stop reason: SDUPTHER

## 2018-01-09 RX ORDER — DEXTROAMPHETAMINE SACCHARATE, AMPHETAMINE ASPARTATE MONOHYDRATE, DEXTROAMPHETAMINE SULFATE AND AMPHETAMINE SULFATE 7.5; 7.5; 7.5; 7.5 MG/1; MG/1; MG/1; MG/1
30 CAPSULE, EXTENDED RELEASE ORAL
Qty: 30 CAP | Refills: 0 | Status: SHIPPED | OUTPATIENT
Start: 2018-02-08 | End: 2018-03-10

## 2018-01-09 NOTE — MR AVS SNAPSHOT
Visit Information Date & Time Provider Department Dept. Phone Encounter #  
 1/9/2018 10:45 AM Olu Vo MD [address] [phone] [id number] Follow-up Instructions Return in about 3 months (around 4/9/2018). Upcoming Health Maintenance Date Due COLONOSCOPY 8/22/1975 BREAST CANCER SCRN MAMMOGRAM 6/1/2017 ZOSTER VACCINE AGE 60> 6/22/2017 Influenza Age 5 to Adult 8/1/2017 DTaP/Tdap/Td series (2 - Td) 10/30/2025 Allergies as of 1/9/2018  Review Complete On: 1/9/2018 By: Olu Vo MD  
 No Known Allergies Current Immunizations  Reviewed on 5/2/2017 Name Date Influenza Vaccine 11/5/2014 Influenza Vaccine (Quad) 10/30/2015 10:27 AM  
 Pneumococcal Polysaccharide (PPSV-23) 5/2/2017 Tdap 10/30/2015 10:27 AM  
  
 Not reviewed this visit You Were Diagnosed With   
  
 Codes Comments Mood disorder ([facility]    -  Primary ICD-10-CM: F39 
ICD-9-CM: 296.90 Attention deficit hyperactivity disorder (ADHD), combined type     ICD-10-CM: F90.2 ICD-9-CM: 314.01 Anxiety disorder, unspecified type     ICD-10-CM: F41.9 ICD-9-CM: 300.00 Vitals BP Pulse Height(growth percentile) Weight(growth percentile) BMI OB Status 158/89 90 5' 5\" (1.651 m) 105 lb (47.6 kg) 17.47 kg/m2 Hysterectomy Smoking Status Current Every Day Smoker Vitals History BMI and BSA Data Body Mass Index Body Surface Area  
 17.47 kg/m 2 1.48 m 2 Preferred Pharmacy Pharmacy Name Phone [address] [phone] Your Updated Medication List  
  
   
This list is accurate as of: 1/9/18 10:58 AM.  Always use your most recent med list.  
  
  
  
  
 * amphetamine-dextroamphetamine XR 30 mg XR capsule Commonly known as:  ADDERALL XR Take 1 Cap (30 mg total) by mouth every morningIndications: Attention-Deficit Hyperactivity Disorder. Max Daily Amount: 30 mg  
  
 * amphetamine-dextroamphetamine XR 30 mg XR capsule Commonly known as:  ADDERALL XR Take 1 Cap (30 mg total) by mouth every morningIndications: Attention-Deficit Hyperactivity Disorder Earliest Fill Date: 2/8/18. Max Daily Amount: 30 mg  
Start taking on:  2/8/2018 * amphetamine-dextroamphetamine XR 30 mg XR capsule Commonly known as:  ADDERALL XR Take 1 Cap (30 mg total) by mouth every morningIndications: Attention-Deficit Hyperactivity Disorder Earliest Fill Date: 3/10/18. Max Daily Amount: 30 mg  
Start taking on:  3/10/2018 FLUoxetine 40 mg capsule Commonly known as:  PROzac Take 2 Caps by mouth daily. Indications: ANXIETY WITH DEPRESSION  
  
 lisinopril 10 mg tablet Commonly known as:  Dellis Rumble Take 1 Tab by mouth daily. triamterene-hydroCHLOROthiazide 37.5-25 mg per capsule Commonly known as:  Samule Juan David TAKE ONE CAPSULE BY MOUTH DAILY FOR HYPERTENSION  Indications: hypertension * Notice: This list has 3 medication(s) that are the same as other medications prescribed for you. Read the directions carefully, and ask your doctor or other care provider to review them with you. Prescriptions Printed Refills  
 amphetamine-dextroamphetamine XR (ADDERALL XR) 30 mg XR capsule 0 Sig: Take 1 Cap (30 mg total) by mouth every morningIndications: Attention-Deficit Hyperactivity Disorder. Max Daily Amount: 30 mg  
 Class: Print Route: Oral  
 amphetamine-dextroamphetamine XR (ADDERALL XR) 30 mg XR capsule 0 Starting on: 2/8/2018 Sig: Take 1 Cap (30 mg total) by mouth every morningIndications: Attention-Deficit Hyperactivity Disorder Earliest Fill Date: 2/8/18. Max Daily Amount: 30 mg  
 Class: Print Route: Oral  
 amphetamine-dextroamphetamine XR (ADDERALL XR) 30 mg XR capsule 0 Starting on: 3/10/2018 Sig: Take 1 Cap (30 mg total) by mouth every morningIndications: Attention-Deficit Hyperactivity Disorder Earliest Fill Date: 3/10/18. Max Daily Amount: 30 mg  
 Class: Print Route: Oral  
  
Prescriptions Sent to Pharmacy Refills FLUoxetine (PROZAC) 40 mg capsule 1 Sig: Take 2 Caps by mouth daily. Indications: ANXIETY WITH DEPRESSION Class: Normal  
 Pharmacy: [address] [place] [address], [facility] Facility #: [phone] Route: Oral  
  
Follow-up Instructions Return in about 3 months (around 4/9/2018). Introducing \[facility]\"" & [facility]! Judith Bermudez introduces [employer] patient portal. Now you can access parts of your medical record, email your doctor's office, and request medication refills online. 1. In your internet browser, go to [email or web address] 2. Click on the First Time User? Click Here link in the Sign In box. You will see the New Member Sign Up page. 3. Enter your [employer] Access Code exactly as it appears below. You will not need to use this code after youve completed the sign-up process. If you do not sign up before the expiration date, you must request a new code. · [employer] Access Code: [email or web address] Expires: 4/9/2018 10:50 AM 
 
4. Enter the last four digits of your Social Security Number (xxxx) and Date of Birth (mm/dd/yyyy) as indicated and click Submit. You will be taken to the next sign-up page. 5. Create a [employer] ID. This will be your [employer] login ID and cannot be changed, so think of one that is secure and easy to remember. 6. Create a [employer] password. You can change your password at any time. 7. Enter your Password Reset Question and Answer. This can be used at a later time if you forget your password. 8. Enter your e-mail address. You will receive e-mail notification when new information is available in [address] 9. Click Sign Up. You can now view and download portions of your medical record. 10. Click the Download Summary menu link to download a portable copy of your medical information. If you have questions, please visit the Frequently Asked Questions section of the [employer] website. Remember, [employer] is NOT to be used for urgent needs. For medical emergencies, dial 911. Now available from your iPhone and Android! Please provide this summary of care documentation to your next provider. Your primary care clinician is listed as [address]. If you have any questions after today's visit, please call [phone].

## 2018-01-12 NOTE — PROGRESS NOTES
Psychiatric Progress Note    Date: 1/9/2018  Account Number:  [id number]  Name: Abhilash Pope    Length of psychotherapy session: 15 minutes     Total Patient Care Time Spent: 20 minutes : (Coordinated care:  counseling time with patient, individual psychotherapy with patient; discussions with family members and chart review). SUBJECTIVE:   Abhilash Pope  is a 63 y.o.  female  patient presents for a therapy/psychopharmacological management appointment. Pt had been under stress taking care of her father in law, who has been in and out of the hospital. Somewhat scattered but nothing like before. Mood seems to be improved with the medicine. Focus and concentration is much better. Pt is taking medicine as prescribed, sometimes misses the dose per her, reinforced compliance. Patient denies SI/HI/SIB. No evidence of AH/VH or delusions.       Appetite:no change from normal   Sleep: no change     Response to Treatment: Positive   Side Effects: none      Supportive/Cognitive/Reality-Oriented psychotherapy provided in regards to psychosocial stressors:   pre-admission and current problems   Housing issues   Occupational issues   Academic issues   Legal issues   Medical issues   Interpersonal conflicts   Stress of hospitalization  Psychoeducation provided  Treatment plan reviewed with patient-including diagnosis and medications  Worked on issues of denial & effects of substance dependency/use      OBJECTIVE:                 Mental Status exam: WNL except for      Sensorium  oriented to time, place and person   Relations cooperative    Eye Contact    appropriate   Appearance:  age appropriate, casually dressed and groomed, thin    Motor Behavior:  hyperactive and within normal limits   Speech:  hyperverbal   Thought Process: loose associations and tangential   Thought Content free of delusions and free of hallucinations   Suicidal ideations none   Homicidal ideations none   Mood:  Stable/stressed   Affect:  Mood congruent Memory recent  adequate   Memory remote:  adequate   Concentration:  adequate   Abstraction:  abstract   Insight:  fair   Reliability fair   Judgment:  fair       No Known Allergies     Current Outpatient Prescriptions   Medication Sig Dispense Refill    FLUoxetine (PROZAC) 40 mg capsule Take 2 Caps by mouth daily. Indications: ANXIETY WITH DEPRESSION 60 Cap 1    amphetamine-dextroamphetamine XR (ADDERALL XR) 30 mg XR capsule Take 1 Cap (30 mg total) by mouth every morningIndications: Attention-Deficit Hyperactivity Disorder. Max Daily Amount: 30 mg 30 Cap 0    [START ON 2/8/2018] amphetamine-dextroamphetamine XR (ADDERALL XR) 30 mg XR capsule Take 1 Cap (30 mg total) by mouth every morningIndications: Attention-Deficit Hyperactivity Disorder Earliest Fill Date: 2/8/18. Max Daily Amount: 30 mg 30 Cap 0    [START ON 3/10/2018] amphetamine-dextroamphetamine XR (ADDERALL XR) 30 mg XR capsule Take 1 Cap (30 mg total) by mouth every morningIndications: Attention-Deficit Hyperactivity Disorder Earliest Fill Date: 3/10/18. Max Daily Amount: 30 mg 30 Cap 0    triamterene-hydroCHLOROthiazide (DYAZIDE) 37.5-25 mg per capsule TAKE ONE CAPSULE BY MOUTH DAILY FOR HYPERTENSION  Indications: hypertension 90 Cap 1    lisinopril (PRINIVIL, ZESTRIL) 10 mg tablet Take 1 Tab by mouth daily. 90 Tab 1        Medication Changes/Adjustments:   Medications Discontinued During This Encounter   Medication Reason    FLUoxetine (PROZAC) 40 mg capsule Reorder    amphetamine-dextroamphetamine XR (ADDERALL XR) 30 mg XR capsule Reorder          ASSESSMENT:  Randal David  is a 63 y.o.  female patient presented for her f/u appointment. Pt is responding well to the meds, family stress continues, discussed the ways to improve her stress. Diagnoses:   Axis I: Mood disorder  ADHD   Caffeine Dependence   Axis II: Deferred   Axis III: HTN, AVM, and Arthritis   Axis IV:Moderate  Axis V: 60-69    RECOMMENDATIONS/PLAN:   1.   Medications: Medications reviewed, continue with the current meds. Orders Placed This Encounter    FLUoxetine (PROZAC) 40 mg capsule    amphetamine-dextroamphetamine XR (ADDERALL XR) 30 mg XR capsule    amphetamine-dextroamphetamine XR (ADDERALL XR) 30 mg XR capsule    amphetamine-dextroamphetamine XR (ADDERALL XR) 30 mg XR capsule      2. Follow-up Disposition:  Return in about 3 months (around 4/9/2018).

## 2018-03-20 ENCOUNTER — OFFICE VISIT (OUTPATIENT)
Dept: INTERNAL MEDICINE CLINIC | Age: 63
End: 2018-03-20

## 2018-03-20 VITALS
RESPIRATION RATE: 18 BRPM | SYSTOLIC BLOOD PRESSURE: 140 MMHG | BODY MASS INDEX: 17.83 KG/M2 | HEIGHT: 65 IN | HEART RATE: 95 BPM | OXYGEN SATURATION: 99 % | TEMPERATURE: 100.8 F | WEIGHT: 107 LBS | DIASTOLIC BLOOD PRESSURE: 90 MMHG

## 2018-03-20 DIAGNOSIS — J06.9 UPPER RESPIRATORY TRACT INFECTION, UNSPECIFIED TYPE: Primary | ICD-10-CM

## 2018-03-20 DIAGNOSIS — I10 BENIGN ESSENTIAL HYPERTENSION: ICD-10-CM

## 2018-03-20 RX ORDER — BISMUTH SUBSALICYLATE 262 MG
1 TABLET,CHEWABLE ORAL DAILY
COMMUNITY

## 2018-03-20 NOTE — PATIENT INSTRUCTIONS
Decrease salt/sodium intake    Decrease caffeine intake  ________________________________    1. Mucinex (Guaifenesen) plain-Blue Box 600 mg. Take one twice daily with full glass water   Take for 10 days    2. Saline Nasal Spray - use liberally to flush post-nasal area; use as many times a day as desired. Keep spraying with head tilted back until you feel need to swallow    3. Drink lots of fluids (mainly water) to keep mucus thinner    4. If needed, for cough, we recommend Delsym cough syrup    5. Long acting antihistamine (Allegra/Fexofenadine or Zyrtec/Ceterizine) is useful if allergy symptoms are also present    6.  Decongestants should not be used as  they actually contribute to overdrying/thickening of the mucus, and can raise the BP and overstimulate the heart

## 2018-03-20 NOTE — MR AVS SNAPSHOT
[address] [facility], [address] [facility] 57 
[phone] Patient: Ilan Stewart MRN: [id number] Visit Information Date & Time Provider Department Dept. Phone Encounter #  
 3/20/2018  2:20 PM [facility] [phone] Follow-up Instructions Return for establish with [facility]. Your Appointments 4/9/2018 11:15 AM  
ESTABLISHED PATIENT with MD Neeta Hicks) Appt Note: f/u  
 [address] [place] [address]  
[phone] [address] [place] [address] Upcoming Health Maintenance Date Due COLONOSCOPY 8/22/1975 BREAST CANCER SCRN MAMMOGRAM 6/1/2017 ZOSTER VACCINE AGE 60> 6/22/2017 Influenza Age 5 to Adult 8/1/2017 MEDICARE YEARLY EXAM 3/14/2018 DTaP/Tdap/Td series (2 - Td) 10/30/2025 Allergies as of 3/20/2018  Review Complete On: 3/20/2018 By: Ernst Jimenez NP No Known Allergies Current Immunizations  Reviewed on 5/2/2017 Name Date Influenza Vaccine 11/5/2014 Influenza Vaccine (Quad) 10/30/2015 10:27 AM  
 Pneumococcal Polysaccharide (PPSV-23) 5/2/2017 Tdap 10/30/2015 10:27 AM  
  
 Not reviewed this visit You Were Diagnosed With   
  
 Codes Comments Upper respiratory tract infection, unspecified type    -  Primary ICD-10-CM: J06.9 ICD-9-CM: 465.9 Benign essential hypertension     ICD-10-CM: I10 
ICD-9-CM: 401.1 Vitals BP Pulse Temp Resp Height(growth percentile) Weight(growth percentile) 140/90 (BP 1 Location: Left arm, BP Patient Position: Sitting) 95 (!) 100.8 °F (38.2 °C) (Oral) 18 5' 5\" (1.651 m) 107 lb (48.5 kg) SpO2 BMI OB Status Smoking Status 99% 17.81 kg/m2 Hysterectomy Current Every Day Smoker Vitals History BMI and BSA Data Body Mass Index Body Surface Area 17.81 kg/m 2 1.49 m 2 Preferred Pharmacy Pharmacy Name Phone [address] [place] [address], [address] [place] [address] Rd. [phone] Your Updated Medication List  
  
   
This list is accurate as of 3/20/18  3:16 PM.  Always use your most recent med list.  
  
  
  
  
 amphetamine-dextroamphetamine XR 30 mg XR capsule Commonly known as:  ADDERALL XR Take 1 Cap (30 mg total) by mouth every morningIndications: Attention-Deficit Hyperactivity Disorder Earliest Fill Date: 3/10/18. Max Daily Amount: 30 mg FLUoxetine 40 mg capsule Commonly known as:  PROzac Take 2 Caps by mouth daily. Indications: ANXIETY WITH DEPRESSION  
  
 lisinopril 10 mg tablet Commonly known as:  Alexandra Nikolay Take 1 Tab by mouth daily. multivitamin tablet Commonly known as:  ONE A DAY Take 1 Tab by mouth daily. For women over 50  
  
 triamterene-hydroCHLOROthiazide 37.5-25 mg per capsule Commonly known as:  Pasha Kisha TAKE ONE CAPSULE BY MOUTH DAILY FOR HYPERTENSION  Indications: hypertension Follow-up Instructions [facility] Patient Instructions Decrease salt/sodium intake Decrease caffeine intake 
________________________________ 1. Mucinex (Guaifenesen) plain-Blue Box 600 mg. Take one twice daily with full glass water Take for 10 days 2. Saline Nasal Spray - use liberally to flush post-nasal area; use as many times a day as desired. Keep spraying with head tilted back until you feel need to swallow 3. Drink lots of fluids (mainly water) to keep mucus thinner 4. If needed, for cough, we recommend Delsym cough syrup 5. Long acting antihistamine (Allegra/Fexofenadine or Zyrtec/Ceterizine) is useful if allergy symptoms are also present 6. Decongestants should not be used as  they actually contribute to overdrying/thickening of the mucus, and can raise the BP and overstimulate the heart Introducing [facility] & HEALTH SERVICES! [employer] introduces [employer] patient portal. Now you can access parts of your medical record, email your doctor's office, and request medication refills online. 1. In your internet browser, go to [email or web address] 2. Click on the First Time User? Click Here link in the Sign In box. You will see the New Member Sign Up page. 3. Enter your [employer] Access Code exactly as it appears below. You will not need to use this code after youve completed the sign-up process. If you do not sign up before the expiration date, you must request a new code. · [employer] Access Code: [email or web address] Expires: 4/9/2018 11:50 AM 
 
4. Enter the last four digits of your Social Security Number (xxxx) and Date of Birth (mm/dd/yyyy) as indicated and click Submit. You will be taken to the next sign-up page. 5. Create a [employer] ID. This will be your [employer] login ID and cannot be changed, so think of one that is secure and easy to remember. 6. Create a [employer] password. You can change your password at any time. 7. Enter your Password Reset Question and Answer. This can be used at a later time if you forget your password. 8. Enter your e-mail address. You will receive e-mail notification when new information is available in [address] 9. Click Sign Up. You can now view and download portions of your medical record. 10. Click the Download Summary menu link to download a portable copy of your medical information. If you have questions, please visit the Frequently Asked Questions section of the [employer] website. Remember, [employer] is NOT to be used for urgent needs. For medical emergencies, dial 911. Now available from your iPhone and Android! Please provide this summary of care documentation to your next provider. Your primary care clinician is listed as [address]. If you have any questions after today's visit, please call [phone].

## 2018-03-20 NOTE — PROGRESS NOTES
64 yo female reports onset today of sore throat, cough, sweats/chills. Her daughter is under tx for acute Bronchitis. She was taking her father-in-law to medical appointments over the last 3 weeks, and there were many coughing patients in those waiting rooms. She had the flu once in her life, and she states \"this is NOT the flu\". She has been taking Tylenol or Aleve. She is concerned about her BP. She is on 2 meds for management, but readily admits she eats a lot of salt and drinks a LOT of caffeine (20 cups/day). Also she is on Adderall, and sees Dr. Aiyana Buckner. She is tired all the time despite sleeping for 10 hrs. PE: Thin WF is alert and oriented   T - 100.8   BP - 140/90   Phar - red   Neck - no nodes   Lungs - clear   Ears - canals small making view of the TM difficult    Imp: Viral URI     Plan: NSAID for fever above 100 or sore throat or aching   Hydration   Call if sxs worsen or fever above 100 after day 3-4 of this illness   She will return to establish with new PCP and to f/u on fatigue and HTN  _________________________  Expected course of current diagnosed problem(s) as well as expected progression and possible complications, and desired follow up with provider are discussed with patient. Patient is encouraged to be back in touch with any questions or concerns. Patient expresses understanding of plan of care. Patient is given AVS which includes diagnoses, current medications, vitals.

## 2018-03-20 NOTE — PROGRESS NOTES
1. Have you been to the ER, urgent care clinic since your last visit? Hospitalized since your last visit? No    2. Have you seen or consulted any other health care providers outside of the [facility] since your last visit? Include any pap smears or colon screening. No     Chief Complaint   Patient presents with    Cold Symptoms     patient woke up this morning with a fever of 99.9 then 101.1; states she also has some body aches, headache, cough, sore throat.  Hypertension     states that blood pressure continues to run high- does not remember how high; has been taking medications as prescribed;       Not fasting

## 2018-03-23 ENCOUNTER — TELEPHONE (OUTPATIENT)
Dept: INTERNAL MEDICINE CLINIC | Age: 63
End: 2018-03-23

## 2018-03-23 ENCOUNTER — OFFICE VISIT (OUTPATIENT)
Dept: INTERNAL MEDICINE CLINIC | Age: 63
End: 2018-03-23

## 2018-03-23 VITALS
DIASTOLIC BLOOD PRESSURE: 62 MMHG | WEIGHT: 104 LBS | HEART RATE: 91 BPM | OXYGEN SATURATION: 96 % | RESPIRATION RATE: 15 BRPM | TEMPERATURE: 99.6 F | HEIGHT: 65 IN | SYSTOLIC BLOOD PRESSURE: 104 MMHG | BODY MASS INDEX: 17.33 KG/M2

## 2018-03-23 DIAGNOSIS — J01.00 ACUTE NON-RECURRENT MAXILLARY SINUSITIS: Primary | ICD-10-CM

## 2018-03-23 DIAGNOSIS — R05.9 COUGH: ICD-10-CM

## 2018-03-23 RX ORDER — NAPROXEN 500 MG/1
500 TABLET ORAL 2 TIMES DAILY WITH MEALS
COMMUNITY
End: 2019-01-25

## 2018-03-23 RX ORDER — DOXYCYCLINE 100 MG/1
100 CAPSULE ORAL 2 TIMES DAILY
Qty: 28 CAP | Refills: 0 | Status: SHIPPED | OUTPATIENT
Start: 2018-03-23 | End: 2018-04-06

## 2018-03-23 NOTE — PROGRESS NOTES
Patient's identity verified with two patient identifiers (name and date of birth). 1. Have you been to the ER, urgent care clinic since your last visit? Hospitalized since your last visit? No  2. Have you seen or consulted any other health care providers outside of the [address] [place] [address] since your last visit? Include any pap smears or colon screening. No    Chief Complaint   Patient presents with    Fever     Still with cough/sore throat/fever/fatigue/dry&burning nose, seen Nghia Simmonsing 3/20/18 and advised to return call, was told to come to office since she is out. Naproxen q12h, Tylenol in between. Relief x4hrs, but returns. Fever is around 99 degrees. Has tried saline rise with no relief. [address]     Previous Dr. Hood Montanez    Hypertension     Has been elevated, wants to discuss change, has been running 160s/100. Not fasting. Health Maintenance Due   Topic Date Due    COLONOSCOPY   Due, aware. 08/22/1975    BREAST CANCER SCRN MAMMOGRAM   Due, aware. 06/01/2017    ZOSTER VACCINE AGE 60>   Has not had 06/22/2017    Influenza Age 5 to Adult   Has not had 08/01/2017    MEDICARE YEARLY EXAM   due 03/14/2018     Reviewed record in preparation for visit and have obtained necessary documentation.     Wt Readings from Last 3 Encounters:   03/23/18 104 lb (47.2 kg)   03/20/18 107 lb (48.5 kg)   01/09/18 105 lb (47.6 kg)     Temp Readings from Last 3 Encounters:   03/23/18 99.6 °F (37.6 °C) (Oral)   03/20/18 (!) 100.8 °F (38.2 °C) (Oral)   08/02/17 98.1 °F (36.7 °C) (Oral)     BP Readings from Last 3 Encounters:   03/23/18 104/62   03/20/18 140/90   01/09/18 158/89     Pulse Readings from Last 3 Encounters:   03/23/18 91   03/20/18 95   01/09/18 90

## 2018-03-23 NOTE — PATIENT INSTRUCTIONS
Take doxycycline 100mg capsules, 1 pill 2x/day x 7 days    Return for your [facility]

## 2018-03-23 NOTE — PROGRESS NOTES
Subjective:      Candelario Mayo is a 63 y.o. female who presents today for follow up of fever, sore throat, chills    Saw Giana Barney NP for viral URI  Now with low grade fever, chills, fatgiue  Nasal congestion has resolved  Coughing \"only if I breath or move\"  Nasal drainage now yellow and green  Now maxillary sinuses and eyes hurting  No dental or jaw pain  Is a smoker    Has bad allergies, seasonal and environmental  Taking zyrtec   Does not like steroid nasal sprays    Needs colonoscopy and mammogram    Patient Active Problem List    Diagnosis Date Noted    Recurrent depression ([facility] 01/09/2018    Adult BMI <19 kg/sq m 05/02/2017    Tobacco dependence 10/30/2015    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  multivitamin (ONE A DAY) tablet Take 1 Tab by mouth daily. For women over 50      FLUoxetine (PROZAC) 40 mg capsule Take 2 Caps by mouth daily. Indications: ANXIETY WITH DEPRESSION 60 Cap 1    amphetamine-dextroamphetamine XR (ADDERALL XR) 30 mg XR capsule Take 1 Cap (30 mg total) by mouth every morningIndications: Attention-Deficit Hyperactivity Disorder Earliest Fill Date: 3/10/18. Max Daily Amount: 30 mg 30 Cap 0    triamterene-hydroCHLOROthiazide (DYAZIDE) 37.5-25 mg per capsule TAKE ONE CAPSULE BY MOUTH DAILY FOR HYPERTENSION  Indications: hypertension 90 Cap 1    lisinopril (PRINIVIL, ZESTRIL) 10 mg tablet Take 1 Tab by mouth daily.  90 Tab 1        Review of Systems    Pertinent items are noted in HPI. Objective:     Visit Vitals    /62 (BP 1 Location: Left arm, BP Patient Position: Sitting)    Pulse 91    Temp 99.6 °F (37.6 °C) (Oral)    Resp 15    Ht 5' 5\" (1.651 m)    Wt 104 lb (47.2 kg)    SpO2 96%    BMI 17.31 kg/m2     General appearance: alert, cooperative, no distress, appears stated age  Head: Normocephalic, without obvious abnormality, atraumatic  Ears: very small external canals, TMs normal  Nose: Nares normal. Septum midline. Mucosa normal. No drainage. + bilateral maxillary sinus tenderness to palpation  Throat: + PND, no pharyngeal erythema or exudate  Neck: supple, symmetrical, trachea midline, no adenopathy  Lungs: clear to auscultation bilaterally, + nonproductive hacking cough  Heart: regular rate and rhythm  Extremities: extremities normal, atraumatic, no edema  Skin: warm and dry  Lymph nodes: Cervical axillary nodes normal.  Neurologic: Grossly normal    Assessment/Plan:     1. Acute non-recurrent maxillary sinusitis  - naproxen (NAPROSYN) 500 mg tablet; Take 500 mg by mouth two (2) times daily (with meals). - doxycycline (MONODOX) 100 mg capsule; Take 1 Cap by mouth two (2) times a day for 14 days. Dispense: 28 Cap; Refill: 0    2. Cough  - in smoker  - doxycycline (MONODOX) 100 mg capsule; Take 1 Cap by mouth two (2) times a day for 14 days. Dispense: 28 Cap; Refill: 0      Advised her to call back or return to office if symptoms worsen/change/persist.  Discussed expected course/resolution/complications of diagnosis in detail with patient. Medication risks/benefits/costs/interactions/alternatives discussed with patient. She was given an after visit summary which includes diagnoses, current medications, & vitals. She expressed understanding with the diagnosis and plan.     MAURO Horan

## 2018-03-23 NOTE — MR AVS SNAPSHOT
[address] [facility], [address] [facility] 57 
[phone] Patient: Cuco Marshall MRN: [id number]:[id number] Visit Information Date & Time Provider Department Dept. Phone Encounter #  
 3/23/2018  3:20 PM [facility] [phone] Follow-up Instructions Return in about 3 months (around 6/23/2018) for [facility] DIVISION Wellness, CPE. Your Appointments 4/9/2018 11:15 AM  
ESTABLISHED PATIENT with MD Neeta Faye [address] [facility]) Appt Note: f/u  
 [address] [facility] [address] [facility] 13  
[phone] [address] [facility] [address] Upcoming Health Maintenance Date Due COLONOSCOPY 8/22/1975 BREAST CANCER SCRN MAMMOGRAM 6/1/2017 ZOSTER VACCINE AGE 60> 6/22/2017 Influenza Age 5 to Adult 8/1/2017 MEDICARE YEARLY EXAM 3/14/2018 DTaP/Tdap/Td series (2 - Td) 10/30/2025 Allergies as of 3/23/2018  Review Complete On: 3/23/2018 By: Ronnie Jerod No Known Allergies Current Immunizations  Reviewed on 5/2/2017 Name Date Influenza Vaccine 11/5/2014 Influenza Vaccine (Quad) 10/30/2015 10:27 AM  
 Pneumococcal Polysaccharide (PPSV-23) 5/2/2017 Tdap 10/30/2015 10:27 AM  
  
 Not reviewed this visit You Were Diagnosed With   
  
 Codes Comments Acute non-recurrent maxillary sinusitis    -  Primary ICD-10-CM: J01.00 ICD-9-CM: 461.0 Vitals BP Pulse Temp Resp Height(growth percentile) Weight(growth percentile) 104/62 (BP 1 Location: Left arm, BP Patient Position: Sitting) 91 99.6 °F (37.6 °C) (Oral) 15 5' 5\" (1.651 m) 104 lb (47.2 kg) SpO2 BMI OB Status Smoking Status 96% 17.31 kg/m2 Hysterectomy Current Every Day Smoker Vitals History BMI and BSA Data Body Mass Index Body Surface Area  
 17.31 kg/m 2 1.47 m 2 Preferred Pharmacy Pharmacy Name Phone [address] [place] [address], [address] [place] [address] Rd. [phone] Your Updated Medication List  
  
   
This list is accurate as of 3/23/18  3:47 PM.  Always use your most recent med list.  
  
  
  
  
 amphetamine-dextroamphetamine XR 30 mg XR capsule Commonly known as:  ADDERALL XR Take 1 Cap (30 mg total) by mouth every morningIndications: Attention-Deficit Hyperactivity Disorder Earliest Fill Date: 3/10/18. Max Daily Amount: 30 mg  
  
 doxycycline 100 mg capsule Commonly known as:  Early Phelps Take 1 Cap by mouth two (2) times a day for 14 days. FLUoxetine 40 mg capsule Commonly known as:  PROzac Take 2 Caps by mouth daily. Indications: ANXIETY WITH DEPRESSION  
  
 lisinopril 10 mg tablet Commonly known as:  Alexandra [place] Take 1 Tab by mouth daily. multivitamin tablet Commonly known as:  ONE A DAY Take 1 Tab by mouth daily. For women over 50  
  
 naproxen 500 mg tablet Commonly known as:  NAPROSYN Take 500 mg by mouth two (2) times daily (with meals). triamterene-hydroCHLOROthiazide 37.5-25 mg per capsule Commonly known as:  Pasha Kisha TAKE ONE CAPSULE BY MOUTH DAILY FOR HYPERTENSION  Indications: hypertension Prescriptions Sent to Pharmacy Refills  
 doxycycline (MONODOX) 100 mg capsule 0 Sig: Take 1 Cap by mouth two (2) times a day for 14 days. Class: Normal  
 Pharmacy: [facility] [address] NAEL ALEXANDER [facility], [address] [facility] #: [phone] Route: Oral  
  
Follow-up Instructions Return in about 3 months (around 6/23/2018) for [facility] DIV[facility] Wellness, CPE. Patient Instructions Take doxycycline 100mg capsules, 1 pill 2x/day x 7 days Return for your Medicare Wellness Introducing [facility] & [facility]! [facility] introduces [employer] patient portal. Now you can access parts of your medical record, email your doctor's office, and request medication refills online. 1. In your internet browser, go to [email or web address] 2. Click on the First Time User? Click Here link in the Sign In box. You will see the New Member Sign Up page. 3. Enter your [employer] Access Code exactly as it appears below. You will not need to use this code after youve completed the sign-up process. If you do not sign up before the expiration date, you must request a new code. · [employer] Access Code: [email or web address] Expires: 4/9/2018 11:50 AM 
 
4. Enter the last four digits of your Social Security Number (xxxx) and Date of Birth (mm/dd/yyyy) as indicated and click Submit. You will be taken to the next sign-up page. 5. Create a [employer] ID. This will be your [employer] login ID and cannot be changed, so think of one that is secure and easy to remember. 6. Create a [employer] password. You can change your password at any time. 7. Enter your Password Reset Question and Answer. This can be used at a later time if you forget your password. 8. Enter your e-mail address. You will receive e-mail notification when new information is available in [address] 9. Click Sign Up. You can now view and download portions of your medical record. 10. Click the Download Summary menu link to download a portable copy of your medical information. If you have questions, please visit the Frequently Asked Questions section of the [employer] website. Remember, [employer] is NOT to be used for urgent needs. For medical emergencies, dial 911. Now available from your iPhone and Android! Please provide this summary of care documentation to your next provider. Your primary care clinician is listed as Everton Workman. If you have any questions after today's visit, please call [phone].

## 2018-03-26 ENCOUNTER — TELEPHONE (OUTPATIENT)
Dept: INTERNAL MEDICINE CLINIC | Age: 63
End: 2018-03-26

## 2018-03-26 NOTE — TELEPHONE ENCOUNTER
----- Message from Cheryle Fritz sent at 3/26/2018  3:39 PM EDT -----  Regarding: Dr. Cohen/ Telephone  The pt is asking the nurse to give her a call in reference to a \"antibiotic\" she has been on for several days . Best Contact [phone].

## 2018-03-26 NOTE — TELEPHONE ENCOUNTER
Spoke with patient, two identifiers verified. \"Antibiotic isn't doing anything\". Has not felt any better. Complains BP has gone really low, unsure of reading. Some dizziness with position changes. Low grade fever, still w/ cough. Patient was reassured that her symptoms are normal, needs to finish course, sometimes takes the whole course for patient's to see improvement. Should increase fluids & protein load, advised half gatorade half water is fine. Advised to change positions slowly and dangle feet prior to moving. Advised rest & hydrate, & return call if symptoms don't improve by end of week. Patient verbalizes understanding.

## 2018-03-26 NOTE — TELEPHONE ENCOUNTER
----- Message from Rosendo Suarez sent at 3/26/2018  3:39 PM EDT -----  Regarding: Dr. Cohen/ Telephone  The pt is asking the nurse to give her a call in reference to a \"antibiotic\" she has been on for several days . Best Contact [phone].

## 2018-03-29 ENCOUNTER — OFFICE VISIT (OUTPATIENT)
Dept: INTERNAL MEDICINE CLINIC | Age: 63
End: 2018-03-29

## 2018-03-29 VITALS
RESPIRATION RATE: 15 BRPM | SYSTOLIC BLOOD PRESSURE: 122 MMHG | BODY MASS INDEX: 16.83 KG/M2 | HEART RATE: 87 BPM | OXYGEN SATURATION: 99 % | WEIGHT: 101 LBS | HEIGHT: 65 IN | TEMPERATURE: 99.2 F | DIASTOLIC BLOOD PRESSURE: 82 MMHG

## 2018-03-29 DIAGNOSIS — Z88.9 HISTORY OF SEASONAL ALLERGIES: ICD-10-CM

## 2018-03-29 DIAGNOSIS — R42 DIZZINESS: Primary | ICD-10-CM

## 2018-03-29 NOTE — PATIENT INSTRUCTIONS
Make sure you are drinking enough water, goal is 32 oz water a day    Restart your zyrtec    Check your blood pressure 2x/day and write them down and bring them into clinic next week for a follow up visit         Orthostatic Hypotension: Care Instructions  Your Care Instructions    Orthostatic hypotension is a quick drop in blood pressure. It happens when you get up from sitting or lying down. You may feel faint, lightheaded, or dizzy. When a person sits up or stands up, the body changes the way it pumps blood. This can slow the flow of blood to the brain for a very short time. And that can make you feel lightheaded. Many medicines can cause this problem, especially in older people. Lack of fluids (dehydration) or illnesses such as diabetes or heart disease also can cause it. Follow-up care is a key part of your treatment and safety. Be sure to make and go to all appointments, and call your doctor if you are having problems. It's also a good idea to know your test results and keep a list of the medicines you take. How can you care for yourself at home? · Tell your doctor about any problems you have with your medicines. · If your doctor prescribes medicine to help prevent a low blood pressure problem, take it exactly as prescribed. Call your doctor if you think you are having a problem with your medicine. · Drink plenty of fluids, enough so that your urine is light yellow or clear like water. Choose water and other caffeine-free clear liquids. If you have kidney, heart, or liver disease and have to limit fluids, talk with your doctor before you increase the amount of fluids you drink. · Limit or avoid alcohol and caffeine. · Get up slowly from bed or after sitting for a long time. If you are in bed, roll to your side and swing your legs over the edge of the bed and onto the floor. Push your body up to a sitting position. Wait for a while before you slowly stand up.  If you are dizzy or lightheaded, sit or lie down.  When should you call for help? Call 911 anytime you think you may need emergency care. For example, call if:  ? · You passed out (lost consciousness). ? Watch closely for changes in your health, and be sure to contact your doctor if:  ? · You do not get better as expected. Where can you learn more? Go to http://farooq-heather.info/. Enter C342 in the search box to learn more about \"Orthostatic Hypotension: Care Instructions. \"  Current as of: September 21, 2016  Content Version: 11.4  © 8701-3987 [employer]. Care instructions adapted under license by [employer] (which disclaims liability or warranty for this information). If you have questions about a medical condition or this instruction, always ask your healthcare professional. [address] any warranty or liability for your use of this information.

## 2018-03-29 NOTE — PROGRESS NOTES
Subjective:      Blaine Stiles is a 63 y.o. female who presents today for evaluation of dizziness    Has bad allergies, seasonal and environmental  Not taking zyrtec the past few days    Nasal congestion and cough have gotten better with doxycycline  No fever or chills  No dyspnea or chest pain    Is now getting dizzy with any kind of movement, standing up/going up stairs/etc, dizziness with position changes  Worsening yesterday and today  Is drinking lots of water, drinks 2 8oz bottles of water a day. No other fluids    Takes lisinopril 10mg daily  BP today in clinic 122/82  No home BP monitoring    No dysuria or urinary symptoms    Patient Active Problem List    Diagnosis Date Noted    Recurrent depression (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 [address] 03/31/2015    Depressive disorder, not elsewhere classified 08/27/2014    Generalized anxiety disorder 08/27/2014    Memory disturbance 08/07/2014    AVM (arteriovenous malformation) brain 11/14/2011    Benign essential hypertension      Current Outpatient Prescriptions   Medication Sig Dispense Refill    naproxen (NAPROSYN) 500 mg tablet Take 500 mg by mouth two (2) times daily (with meals).  doxycycline (MONODOX) 100 mg capsule Take 1 Cap by mouth two (2) times a day for 14 days. 28 Cap 0    multivitamin (ONE A DAY) tablet Take 1 Tab by mouth daily. For women over 50      FLUoxetine (PROZAC) 40 mg capsule Take 2 Caps by mouth daily. Indications: ANXIETY WITH DEPRESSION 60 Cap 1    amphetamine-dextroamphetamine XR (ADDERALL XR) 30 mg XR capsule Take 1 Cap (30 mg total) by mouth every morningIndications: Attention-Deficit Hyperactivity Disorder Earliest Fill Date: 3/10/18.   Max Daily Amount: 30 mg 30 Cap 0    triamterene-hydroCHLOROthiazide (DYAZIDE) 37.5-25 mg per capsule TAKE ONE CAPSULE BY MOUTH DAILY FOR HYPERTENSION  Indications: hypertension 90 Cap 1    lisinopril (PRINIVIL, ZESTRIL) 10 mg tablet Take 1 Tab by mouth daily. 90 Tab 1        Review of Systems    Pertinent items are noted in HPI. Objective:     Visit Vitals    /82 (BP 1 Location: Left arm, BP Patient Position: Sitting)    Pulse 87    Temp 99.2 °F (37.3 °C) (Oral)    Resp 15    Ht 5' 5\" (1.651 m)    Wt 101 lb (45.8 kg)    SpO2 99%    BMI 16.81 kg/m2     General appearance: alert, cooperative, no distress, appears stated age  Head: Normocephalic, without obvious abnormality, atraumatic  Ears: unable to visualize L TM due to anatomy, normal R TM  Throat: dry oral mucosa  Lungs: clear to auscultation bilaterally  Heart: regular rate and rhythm  Extremities: extremities normal, atraumatic, no cyanosis or edema, steady gait  Skin: warm and dry  Neurologic: Grossly normal    Assessment/Plan:     1. Dizziness  - likely orthostatic, worsened by dehydration  - reviewed poor water intake with patient. Needs to take in at least 32oz water daily, ideally more  - slow position changes  - increase water intake  - check BP 2x/day for the next week and record. Bring readings into clinic next week for follow up    2. History of seasonal allergies  - restart zyrtec  - does not want to start nasal spray      Advised her to call back or return to office if symptoms worsen/change/persist.  Discussed expected course/resolution/complications of diagnosis in detail with patient. Medication risks/benefits/costs/interactions/alternatives discussed with patient. She was given an after visit summary which includes diagnoses, current medications, & vitals. She expressed understanding with the diagnosis and plan.     MAURO King

## 2018-03-29 NOTE — MR AVS SNAPSHOT
[address] [facility], [address] [facility] 
[phone] Patient: Suzann Gilford MRN: [id number] VTO:6/81/[id number] Visit Information Date & Time Provider Department Dept. Phone Encounter #  
 3/29/2018  4:20 PM CHON Mattalou  [facility] [phone] Follow-up Instructions Return in about 1 week (around 4/5/2018) for dizziness/htn follow up. Your Appointments 4/9/2018 11:15 AM  
ESTABLISHED PATIENT with MD Neeta Craig 5 [place] [facility]) Appt Note: f/u  
 [address] [facility] [address] 1400 [address] [place] [address]  
[phone] [address] [place] [address] Upcoming Health Maintenance Date Due COLONOSCOPY 8/22/1975 BREAST CANCER SCRN MAMMOGRAM 6/1/2017 ZOSTER VACCINE AGE 60> 6/22/2017 MEDICARE YEARLY EXAM 3/14/2018 Influenza Age 5 to Adult 9/3/2018* DTaP/Tdap/Td series (2 - Td) 10/30/2025 *Topic was postponed. The date shown is not the original due date. Allergies as of 3/29/2018  Review Complete On: 3/29/2018 By: Jenny Valencia NP No Known Allergies Current Immunizations  Reviewed on 5/2/2017 Name Date Influenza Vaccine 11/5/2014 Influenza Vaccine (Quad) 10/30/2015 10:27 AM  
 Pneumococcal Polysaccharide (PPSV-23) 5/2/2017 Tdap 10/30/2015 10:27 AM  
  
 Not reviewed this visit Vitals BP Pulse Temp Resp Height(growth percentile) Weight(growth percentile) 122/82 (BP 1 Location: Left arm, BP Patient Position: Sitting) 87 99.2 °F (37.3 °C) (Oral) 15 5' 5\" (1.651 m) 101 lb (45.8 kg) SpO2 BMI OB Status Smoking Status 99% 16.81 kg/m2 Hysterectomy Current Every Day Smoker Vitals History BMI and BSA Data Body Mass Index Body Surface Area  
 16.81 kg/m 2 1.45 m 2 Preferred Pharmacy Pharmacy Name Phone 500 [address], [address] [place] [address] Rd. [phone] Your Updated Medication List  
  
   
This list is accurate as of 3/29/18  5:00 PM.  Always use your most recent med list.  
  
  
  
  
 amphetamine-dextroamphetamine XR 30 mg XR capsule Commonly known as:  ADDERALL XR Take 1 Cap (30 mg total) by mouth every morningIndications: Attention-Deficit Hyperactivity Disorder Earliest Fill Date: 3/10/18. Max Daily Amount: 30 mg  
  
 doxycycline 100 mg capsule Commonly known as:  Corwin Fast Take 1 Cap by mouth two (2) times a day for 14 days. FLUoxetine 40 mg capsule Commonly known as:  PROzac Take 2 Caps by mouth daily. Indications: ANXIETY WITH DEPRESSION  
  
 lisinopril 10 mg tablet Commonly known as:  Dellis Rumble Take 1 Tab by mouth daily. multivitamin tablet Commonly known as:  ONE A DAY Take 1 Tab by mouth daily. For women over 50  
  
 naproxen 500 mg tablet Commonly known as:  NAPROSYN Take 500 mg by mouth two (2) times daily (with meals). triamterene-hydroCHLOROthiazide 37.5-25 mg per capsule Commonly known as:  Samule Nicely TAKE ONE CAPSULE BY MOUTH DAILY FOR HYPERTENSION  Indications: hypertension Follow-up Instructions Return in about 1 week (around 4/5/2018) for dizziness/htn follow up. To-Do List   
 04/23/2018 11:00 AM  
  Appointment with Cristhian Wilson NP at [place] [address] [facility] ([phone]) Patient Instructions Make sure you are drinking enough water, goal is 32 oz water a day Restart your zyrtec Check your blood pressure 2x/day and write them down and bring them into clinic next week for a follow up visit Orthostatic Hypotension: Care Instructions Your Care Instructions Orthostatic hypotension is a quick drop in blood pressure. It happens when you get up from sitting or lying down. You may feel faint, lightheaded, or dizzy. When a person sits up or stands up, the body changes the way it pumps blood. This can slow the flow of blood to the brain for a very short time. And that can make you feel lightheaded. Many medicines can cause this problem, especially in older people. Lack of fluids (dehydration) or illnesses such as diabetes or heart disease also can cause it. Follow-up care is a key part of your treatment and safety. Be sure to make and go to all appointments, and call your doctor if you are having problems. It's also a good idea to know your test results and keep a list of the medicines you take. How can you care for yourself at home? · Tell your doctor about any problems you have with your medicines. · If your doctor prescribes medicine to help prevent a low blood pressure problem, take it exactly as prescribed. Call your doctor if you think you are having a problem with your medicine. · Drink plenty of fluids, enough so that your urine is light yellow or clear like water. Choose water and other caffeine-free clear liquids. If you have kidney, heart, or liver disease and have to limit fluids, talk with your doctor before you increase the amount of fluids you drink. · Limit or avoid alcohol and caffeine. · Get up slowly from bed or after sitting for a long time. If you are in bed, roll to your side and swing your legs over the edge of the bed and onto the floor. Push your body up to a sitting position. Wait for a while before you slowly stand up. If you are dizzy or lightheaded, sit or lie down. When should you call for help? Call 911 anytime you think you may need emergency care. For example, call if: 
? · You passed out (lost consciousness). ? Watch closely for changes in your health, and be sure to contact your doctor if: 
? · You do not get better as expected. Where can you learn more? Go to http://farooq-heather.info/.  
Enter Q000 in the search box to learn more about \"Orthostatic Hypotension: Care Instructions. \" Current as of: September 21, 2016 Content Version: 11.4 © 0379-3026 [employer]. Care instructions adapted under license by [facility] (which disclaims liability or warranty for this information). If you have questions about a medical condition or this instruction, always ask your healthcare professional. [address] any warranty or liability for your use of this information. Introducing [facility] & HEALTH SERVICES! Sania Nunez introduces Cloudwise patient portal. Now you can access parts of your medical record, email your doctor's office, and request medication refills online. 1. In your internet browser, go to [email or web address] 2. Click on the First Time User? Click Here link in the Sign In box. You will see the New Member Sign Up page. 3. Enter your Cloudwise Access Code exactly as it appears below. You will not need to use this code after youve completed the sign-up process. If you do not sign up before the expiration date, you must request a new code. · Cloudwise Access Code: [email or web address] Expires: 4/9/2018 11:50 AM 
 
4. Enter the last four digits of your Social Security Number (xxxx) and Date of Birth (mm/dd/yyyy) as indicated and click Submit. You will be taken to the next sign-up page. 5. Create a Cloudwise ID. This will be your Cloudwise login ID and cannot be changed, so think of one that is secure and easy to remember. 6. Create a Cloudwise password. You can change your password at any time. 7. Enter your Password Reset Question and Answer. This can be used at a later time if you forget your password. 8. Enter your e-mail address. You will receive e-mail notification when new information is available in [address] 9. Click Sign Up. You can now view and download portions of your medical record. 10. Click the Download Summary menu link to download a portable copy of your medical information. If you have questions, please visit the Frequently Asked Questions section of the Signalt website. Remember, Apps Genius is NOT to be used for urgent needs. For medical emergencies, dial 911. Now available from your iPhone and Android! Please provide this summary of care documentation to your next provider. Your primary care clinician is listed as [facility]. If you have any questions after today's visit, please call [phone].

## 2018-03-29 NOTE — PROGRESS NOTES
Patient's identity verified with two patient identifiers (name and date of birth). 1. Have you been to the ER, urgent care clinic since your last visit? Hospitalized since your last visit? No  2. Have you seen or consulted any other health care providers outside of the [address] [place] [address] since your last visit? Include any pap smears or colon screening. No    Chief Complaint   Patient presents with    Chills     Intermittent, x2 days. Has not noticed any correlation.  Dizziness     Started yesterday, with activity. Felt SOB. Concern about climbing stairs. Better with rest.  Almost blacked out x1, but thinks related to sugar level (hasn't been eating much), denies passing out/LOC. Some \"pressure\" headaches.  Cough     Deep/productive, \"someways better, somways worse\". Worse with talking/moving. Headaches and nasal congestion are improved. Not fasting. Health Maintenance Due   Topic Date Due    COLONOSCOPY   Due, aware. 08/22/1975    BREAST CANCER SCRN MAMMOGRAM   Due, aware. 06/01/2017    ZOSTER VACCINE AGE 60>   Has not had. 06/22/2017    Influenza Age 9 to Adult   Has not had. 08/01/2017    MEDICARE YEARLY EXAM   Has not had, due. 03/14/2018     Reviewed record in preparation for visit and have obtained necessary documentation.     Wt Readings from Last 3 Encounters:   03/29/18 101 lb (45.8 kg)   03/23/18 104 lb (47.2 kg)   03/20/18 107 lb (48.5 kg)     Temp Readings from Last 3 Encounters:   03/29/18 99.2 °F (37.3 °C) (Oral)   03/23/18 99.6 °F (37.6 °C) (Oral)   03/20/18 (!) 100.8 °F (38.2 °C) (Oral)     BP Readings from Last 3 Encounters:   03/29/18 122/82   03/23/18 104/62   03/20/18 140/90     Pulse Readings from Last 3 Encounters:   03/29/18 87   03/23/18 91   03/20/18 95       Learning Assessment:  :     Learning Assessment 3/29/2018 5/2/2017 9/18/2015 8/7/2014 5/7/2013   PRIMARY LEARNER Patient Patient Patient Patient Patient   HIGHEST LEVEL OF EDUCATION - PRIMARY LEARNER  SOME COLLEGE SOME COLLEGE SOME COLLEGE SOME COLLEGE -   BARRIERS PRIMARY LEARNER NONE NONE NONE NONE -   CO-LEARNER CAREGIVER No No No No -   PRIMARY LANGUAGE ENGLISH ENGLISH ENGLISH ENGLISH ENGLISH    NEED - - No No -   LEARNER PREFERENCE PRIMARY PICTURES VIDEOS VIDEOS DEMONSTRATION DEMONSTRATION     - - - VIDEOS -   LEARNING SPECIAL TOPICS - - no no -   ANSWERED BY patient patient patient patient patient   RELATIONSHIP SELF SELF SELF SELF SELF   ASSESSMENT COMMENT - - none - -         Abuse Screening:  :     Abuse Screening Questionnaire 3/29/2018 5/2/2017 9/18/2015 8/7/2014   Do you ever feel afraid of your partner? N N N N   Are you in a relationship with someone who physically or mentally threatens you? N N N N   Is it safe for you to go home?  Marcelino Farnsworth

## 2018-03-30 DIAGNOSIS — F39 MOOD DISORDER (HCC): ICD-10-CM

## 2018-03-30 DIAGNOSIS — F90.2 ATTENTION DEFICIT HYPERACTIVITY DISORDER (ADHD), COMBINED TYPE: ICD-10-CM

## 2018-03-30 DIAGNOSIS — F41.9 ANXIETY DISORDER, UNSPECIFIED TYPE: ICD-10-CM

## 2018-03-30 RX ORDER — DEXTROAMPHETAMINE SACCHARATE, AMPHETAMINE ASPARTATE MONOHYDRATE, DEXTROAMPHETAMINE SULFATE AND AMPHETAMINE SULFATE 7.5; 7.5; 7.5; 7.5 MG/1; MG/1; MG/1; MG/1
30 CAPSULE, EXTENDED RELEASE ORAL
Qty: 30 CAP | Refills: 0 | Status: SHIPPED | OUTPATIENT
Start: 2018-06-07 | End: 2018-07-07

## 2018-03-30 RX ORDER — DEXTROAMPHETAMINE SACCHARATE, AMPHETAMINE ASPARTATE MONOHYDRATE, DEXTROAMPHETAMINE SULFATE AND AMPHETAMINE SULFATE 7.5; 7.5; 7.5; 7.5 MG/1; MG/1; MG/1; MG/1
30 CAPSULE, EXTENDED RELEASE ORAL
Qty: 30 CAP | Refills: 0 | Status: SHIPPED | OUTPATIENT
Start: 2018-04-08 | End: 2018-03-30 | Stop reason: SDUPTHER

## 2018-03-30 RX ORDER — FLUOXETINE HYDROCHLORIDE 40 MG/1
80 CAPSULE ORAL DAILY
Qty: 60 CAP | Refills: 3 | Status: SHIPPED | OUTPATIENT
Start: 2018-03-30 | End: 2018-07-16 | Stop reason: DRUGHIGH

## 2018-03-30 RX ORDER — DEXTROAMPHETAMINE SACCHARATE, AMPHETAMINE ASPARTATE MONOHYDRATE, DEXTROAMPHETAMINE SULFATE AND AMPHETAMINE SULFATE 7.5; 7.5; 7.5; 7.5 MG/1; MG/1; MG/1; MG/1
30 CAPSULE, EXTENDED RELEASE ORAL
Qty: 30 CAP | Refills: 0 | Status: SHIPPED | OUTPATIENT
Start: 2018-05-08 | End: 2018-03-30 | Stop reason: SDUPTHER

## 2018-04-23 ENCOUNTER — OFFICE VISIT (OUTPATIENT)
Dept: INTERNAL MEDICINE CLINIC | Age: 63
End: 2018-04-23

## 2018-04-23 VITALS
HEART RATE: 88 BPM | WEIGHT: 107 LBS | RESPIRATION RATE: 16 BRPM | DIASTOLIC BLOOD PRESSURE: 98 MMHG | SYSTOLIC BLOOD PRESSURE: 142 MMHG | HEIGHT: 65 IN | TEMPERATURE: 99.4 F | OXYGEN SATURATION: 98 % | BODY MASS INDEX: 17.83 KG/M2

## 2018-04-23 DIAGNOSIS — Z72.0 TOBACCO USE: ICD-10-CM

## 2018-04-23 DIAGNOSIS — M79.632 LEFT FOREARM PAIN: ICD-10-CM

## 2018-04-23 DIAGNOSIS — Z12.31 ENCOUNTER FOR SCREENING MAMMOGRAM FOR MALIGNANT NEOPLASM OF BREAST: ICD-10-CM

## 2018-04-23 DIAGNOSIS — F41.8 DEPRESSION WITH ANXIETY: ICD-10-CM

## 2018-04-23 DIAGNOSIS — Z00.00 MEDICARE ANNUAL WELLNESS VISIT, SUBSEQUENT: Primary | ICD-10-CM

## 2018-04-23 DIAGNOSIS — Z71.89 ACP (ADVANCE CARE PLANNING): ICD-10-CM

## 2018-04-23 DIAGNOSIS — Z12.11 SCREENING FOR COLON CANCER: ICD-10-CM

## 2018-04-23 DIAGNOSIS — Z23 ENCOUNTER FOR IMMUNIZATION: ICD-10-CM

## 2018-04-23 NOTE — PATIENT INSTRUCTIONS
Schedule your mammogram ([phone])    Schedule your colonoscopy- you have to call them- [facility]     Have your Shingrix vaccination done at your pharmacy

## 2018-04-23 NOTE — ACP (ADVANCE CARE PLANNING)
Advance Care Plan or Surrogate Decision Maker documented in medical record.      Discussion regarding Advanced MEdical Directive  Form completed today, pt with original.  A copy placed into chart    Pillo Hodges NP

## 2018-04-23 NOTE — MR AVS SNAPSHOT
[address] [facility], [address] [place] [address] 
[phone] Patient: Sophia Moore MRN: [id number] PKW:[id number] Visit Information Date & Time Provider Department Dept. Phone Encounter #  
 4/23/2018 11:00 AM CHON Maurer  [facility] [phone] [id number] Follow-up Instructions Return in about 6 months (around 10/23/2018) for HTN, depression follow up. Upcoming Health Maintenance Date Due COLONOSCOPY 8/22/1975 BREAST CANCER SCRN MAMMOGRAM 6/1/2017 ZOSTER VACCINE AGE 60> 6/22/2017 Influenza Age 5 to Adult 9/3/2018* MEDICARE YEARLY EXAM 4/24/2019 DTaP/Tdap/Td series (2 - Td) 10/30/2025 *Topic was postponed. The date shown is not the original due date. Allergies as of 4/23/2018  Review Complete On: 4/23/2018 By: Ashley Huggins NP No Known Allergies Current Immunizations  Reviewed on 5/2/2017 Name Date Influenza Vaccine 11/5/2014 Influenza Vaccine (Quad) 10/30/2015 10:27 AM  
 Pneumococcal Polysaccharide (PPSV-23) 5/2/2017 Tdap 10/30/2015 10:27 AM  
  
 Not reviewed this visit You Were Diagnosed With   
  
 Codes Comments Screening for colon cancer    -  Primary ICD-10-CM: Z12.11 ICD-9-CM: V76.51 History of mammogram     ICD-10-CM: Z92.89 ICD-9-CM: V15.89 Encounter for screening mammogram for malignant neoplasm of breast     ICD-10-CM: Z12.31 
ICD-9-CM: V76.12 Encounter for immunization     ICD-10-CM: W95 ICD-9-CM: V03.89 Vitals BP Pulse Temp Resp Height(growth percentile) Weight(growth percentile) (!) 142/98 (BP 1 Location: Left arm, BP Patient Position: Sitting) 88 99.4 °F (37.4 °C) (Oral) 16 5' 5\" (1.651 m) 107 lb (48.5 kg) SpO2 BMI OB Status Smoking Status 98% 17.81 kg/m2 Hysterectomy Current Every Day Smoker Vitals History BMI and BSA Data Body Mass Index Body Surface Area 17.81 kg/m 2 1.49 m 2 Preferred Pharmacy Pharmacy Name Phone 500 Ann Constantino [address] [place] [address], [address] [place] [address] Rd. [phone] Your Updated Medication List  
  
   
This list is accurate as of 18 11:36 AM.  Always use your most recent med list.  
  
  
  
  
 amphetamine-dextroamphetamine XR 30 mg XR capsule Commonly known as:  ADDERALL XR Take 1 Cap (30 mg total) by mouth every morningIndications: Attention-Deficit Hyperactivity Disorder Earliest Fill Date: 18. Max Daily Amount: 30 mg  
Start taking on:  2018 FLUoxetine 40 mg capsule Commonly known as:  PROzac Take 2 Caps by mouth daily. Indications: ANXIETY WITH DEPRESSION  
  
 lisinopril 10 mg tablet Commonly known as:  Zora Husbands Take 1 Tab by mouth daily. multivitamin tablet Commonly known as:  ONE A DAY Take 1 Tab by mouth daily. For women over 50  
  
 naproxen 500 mg tablet Commonly known as:  NAPROSYN Take 500 mg by mouth two (2) times daily (with meals). triamterene-hydroCHLOROthiazide 37.5-25 mg per capsule Commonly known as:  Pati Serene TAKE ONE CAPSULE BY MOUTH DAILY FOR HYPERTENSION  Indications: hypertension  
  
 varicella-zoster recombinant (PF) 50 mcg/0.5 mL Susr injection Commonly known as:  SHINGRIX  
0.5 mL by IntraMUSCular route once for 1 dose. Indications: PREVENTION OF HERPES ZOSTER Prescriptions Printed Refills  
 varicella-zoster recombinant, PF, (SHINGRIX) 50 mcg/0.5 mL susr injection 0 Si.5 mL by IntraMUSCular route once for 1 dose. Indications: PREVENTION OF HERPES ZOSTER Class: Print Route: IntraMUSCular We Performed the Following REFERRAL FOR COLONOSCOPY [EAX532 Custom] Comments:  
 colonoscopy Follow-up Instructions Return in about 6 months (around 10/23/2018) for HTN, depression follow up. To-Do List   
 2018   Imaging:  GAGE MAMMO BI SCREENING INCL CAD   
  
  
 Referral Information Referral ID Referred By Referred To  
  
 [id number] Adrian Lechuga MD   
   [address] [facility] [address] [place], [address] Phone: [phone] Fax: [phone] Visits Status Start Date End Date 1 New Request 4/23/18 4/23/19 If your referral has a status of pending review or denied, additional information will be sent to support the outcome of this decision. Patient Instructions Schedule your mammogram ([phone]) Schedule your colonoscopy- you have to call them- [facility] Have your Shingrix vaccination done at your pharmacy Introducing [facility] & [facility] SERVICES! [facility] introduces Vivo patient portal. Now you can access parts of your medical record, email your doctor's office, and request medication refills online. 1. In your internet browser, go to [email or web address] 2. Click on the First Time User? Click Here link in the Sign In box. You will see the New Member Sign Up page. 3. Enter your Vivo Access Code exactly as it appears below. You will not need to use this code after youve completed the sign-up process. If you do not sign up before the expiration date, you must request a new code. · Vivo Access Code: [email or web address] Expires: 7/22/2018 11:28 AM 
 
4. Enter the last four digits of your Social Security Number (xxxx) and Date of Birth (mm/dd/yyyy) as indicated and click Submit. You will be taken to the next sign-up page. 5. Create a idiagt ID. This will be your idiagt login ID and cannot be changed, so think of one that is secure and easy to remember. 6. Create a idiagt password. You can change your password at any time. 7. Enter your Password Reset Question and Answer. This can be used at a later time if you forget your password. 8. Enter your e-mail address. You will receive e-mail notification when new information is available in [address] 9. Click Sign Up. You can now view and download portions of your medical record. 10. Click the Download Summary menu link to download a portable copy of your medical information. If you have questions, please visit the Frequently Asked Questions section of the Unii website. Remember, Unii is NOT to be used for urgent needs. For medical emergencies, dial 911. Now available from your iPhone and Android! Please provide this summary of care documentation to your next provider. Your primary care clinician is listed as Myla Cooper. If you have any questions after today's visit, please call [phone].

## 2018-04-23 NOTE — PROGRESS NOTES
1. Have you been to the ER, urgent care clinic since your last visit? Hospitalized since your last visit? No    2. Have you seen or consulted any other health care providers outside of the [facility] since your last visit? Include any pap smears or colon screening.  No  Chief Complaint   Patient presents with   Thania Barber Annual Wellness Visit

## 2018-04-23 NOTE — PROGRESS NOTES
Subjective:      Vilma Bernal is a 63 y.o. female who presents today for [facility]    Left arm pain, \"feels like a jelly fish\", radiates Left Wrist into forearm x several weeks. Is s/p surgery by hand specialist 6-8 years. Follows with Dr. Srinivasan Lucero, hasnt seen yet about acute worsening of her wrist pain. Had been planting bulbs and doing yard work just prior to pain beginning. No other trauma. No falls. Has not tried splint or brace. Has put salmonpas cream on it which helps    Depression/Anxiety: angry all the time, cussing. Takes prozac. Psychiatrist Dr. Andre Edouard has left. Was given a referral to Psychiatrist but has not yet called. Does not feel sad, just angry    Denies chest pain, palpitaitons, dyspnea, cough, changes in bowel movements, abdominal pain, headaches or dizziness     Annual Wellness Visit    End of Life Planning: does not have, completed today, see ACP note  Info for MyPreventativeCare: Given to patient, see After Visit Summary      Depression Screen: Patient Health Questionnaire (PHQ-2)   Over the last 2 weeks, how often have you been bothered by any of the following problems? Little interest or pleasure in doing things? Not at all. [0]   Feeling down, depressed, or hopeless? Not at all. [0]    Total Score: 0/6  PHQ-2 Assessment Scoring:   A score of 2 or more requires further screening with the PHQ-9  N/A      Alcohol Risk Factor Screening: You do not drink alcohol or very rarely. Hearing Loss:  Hearing is good. Activities of Daily Living:  Self-care. Requires assistance with: no ADLs    Fall Risk:  History of fall(s) in the past 3 months (25 pts)  Score: 25    Abuse Screen:  Patient is not abused  None    Adult Nutrition Screen:  No risk factors noted.       Patient Active Problem List    Diagnosis Date Noted    Recurrent depression ([facility] [address] 01/09/2018    Adult BMI <19 kg/sq m 05/02/2017    Tobacco dependence 10/30/2015    Mood disorder due to a general medical condition 03/31/2015    ADHD (attention deficit hyperactivity disorder) 03/31/2015    Caffeine dependence ([facility] [address] 03/31/2015    Depressive disorder, not elsewhere classified 08/27/2014    Generalized anxiety disorder 08/27/2014    Memory disturbance 08/07/2014    AVM (arteriovenous malformation) brain 11/14/2011    Benign essential hypertension      Current Outpatient Prescriptions   Medication Sig Dispense Refill    varicella-zoster recombinant, PF, (SHINGRIX) 50 mcg/0.5 mL susr injection 0.5 mL by IntraMUSCular route once for 1 dose. Indications: PREVENTION OF HERPES ZOSTER 0.5 mL 0    [START ON 6/7/2018] amphetamine-dextroamphetamine XR (ADDERALL XR) 30 mg XR capsule Take 1 Cap (30 mg total) by mouth every morningIndications: Attention-Deficit Hyperactivity Disorder Earliest Fill Date: 6/7/18. Max Daily Amount: 30 mg 30 Cap 0    FLUoxetine (PROZAC) 40 mg capsule Take 2 Caps by mouth daily. Indications: ANXIETY WITH DEPRESSION 60 Cap 3    naproxen (NAPROSYN) 500 mg tablet Take 500 mg by mouth two (2) times daily (with meals).  multivitamin (ONE A DAY) tablet Take 1 Tab by mouth daily. For women over 50      triamterene-hydroCHLOROthiazide (DYAZIDE) 37.5-25 mg per capsule TAKE ONE CAPSULE BY MOUTH DAILY FOR HYPERTENSION  Indications: hypertension 90 Cap 1    lisinopril (PRINIVIL, ZESTRIL) 10 mg tablet Take 1 Tab by mouth daily. 90 Tab 1        Review of Systems    Pertinent items are noted in HPI. Objective:     Visit Vitals    BP (!) 142/98 (BP 1 Location: Left arm, BP Patient Position: Sitting)    Pulse 88    Temp 99.4 °F (37.4 °C) (Oral)    Resp 16    Ht 5' 5\" (1.651 m)    Wt 107 lb (48.5 kg)    SpO2 98%    BMI 17.81 kg/m2     General appearance: alert, cooperative, no distress, appears stated age  Head: Normocephalic, without obvious abnormality, atraumatic  Eyes: conjunctivae/corneas clear. PERRL, EOM's intact.   Throat: Lips, mucosa, and tongue normal. Teeth and gums normal  Neck: supple, symmetrical, trachea midline, no adenopathy, no carotid bruit and no JVD  Lungs: clear to auscultation bilaterally  Heart: regular rate and rhythm, S1, S2 normal, no murmur, click, rub or gallop  Extremities: extremities normal, atraumatic, no cyanosis or edema  Pulses: 2+ and symmetric  Skin: warm and dry  Neurologic: Grossly normal    Assessment/Plan:     1. Medicare annual wellness visit, subsequent  - completed    2. Left forearm pain  - follow up with Dr. Althea Ham  - trial wrist brace to provide support    3. Depression with anxiety  - cont current meds  - patient encourgaed to call Psychiatrist recommended by Dr. Maximino Duverney and schedule an appointment to establish care    4. Tobacco use  - not ready to quit    5. Encounter for screening mammogram for malignant neoplasm of breast   - GAGE MAMMO BI SCREENING INCL CAD; Future    6. Screening for colon cancer  - REFERRAL FOR COLONOSCOPY    7. Encounter for immunization  - varicella-zoster recombinant, PF, (SHINGRIX) 50 mcg/0.5 mL susr injection; 0.5 mL by IntraMUSCular route once for 1 dose. Indications: PREVENTION OF HERPES ZOSTER  Dispense: 0.5 mL; Refill: 0    8. Advanced Care Planning  - discussion surrounding goals of care  - Advanced Medical Directive completed today, patient with original, copy placed into chart    Advised her to call back or return to office if symptoms worsen/change/persist.  Discussed expected course/resolution/complications of diagnosis in detail with patient. Medication risks/benefits/costs/interactions/alternatives discussed with patient. She was given an after visit summary which includes diagnoses, current medications, & vitals. She expressed understanding with the diagnosis and plan.     MAURO Ulrich

## 2018-06-11 ENCOUNTER — OFFICE VISIT (OUTPATIENT)
Dept: RHEUMATOLOGY | Age: 63
End: 2018-06-11

## 2018-06-11 VITALS
HEIGHT: 65 IN | OXYGEN SATURATION: 99 % | BODY MASS INDEX: 17.86 KG/M2 | HEART RATE: 70 BPM | WEIGHT: 107.2 LBS | DIASTOLIC BLOOD PRESSURE: 91 MMHG | SYSTOLIC BLOOD PRESSURE: 176 MMHG | TEMPERATURE: 98.5 F | RESPIRATION RATE: 19 BRPM

## 2018-06-11 DIAGNOSIS — M19.042 PRIMARY OSTEOARTHRITIS OF BOTH HANDS: ICD-10-CM

## 2018-06-11 DIAGNOSIS — M19.90 INFLAMMATORY ARTHRITIS: Primary | ICD-10-CM

## 2018-06-11 DIAGNOSIS — M19.041 PRIMARY OSTEOARTHRITIS OF BOTH HANDS: ICD-10-CM

## 2018-06-11 NOTE — PROGRESS NOTES
Exam Room #4  Madhu Ritter is a 63 y.o. female  Chief Complaint   Patient presents with    Joint Pain     2 month follow up    Wrist Pain     Discussed with Dr. Ingrid Jenkins about having a painful sensation in her left wrist, \"feels like a jelly fish is in there,\" and was told to discuss with Rheumatology about her treatment options      1. Have you been to the ER, urgent care clinic since your last visit? Hospitalized since your last visit? No    2. Have you seen or consulted any other health care providers outside of the [facility] since your last visit? Include any pap smears or colon screening.   No    Visit Vitals    BP (!) 176/91 (BP 1 Location: Right arm, BP Patient Position: Sitting)    Pulse 70    Temp 98.5 °F (36.9 °C) (Oral)    Resp 19    Ht 5' 5\" (1.651 m)    Wt 107 lb 3.2 oz (48.6 kg)    SpO2 99%    BMI 17.84 kg/m2

## 2018-06-11 NOTE — PROGRESS NOTES
RHEUMATOLOGY PROBLEM LIST AND CHIEF COMPLAINT  1. Inflammatory arthritis - Joint space narrowing of 2nd and 3rd MCPs, morning stiffness, improvement of joint pain throughout the day, one episode of arthritis in right wrist that responded to prednisone, JOSELITO positive, CCP positive, CRP elevated   2. Osteoarthritis     INTERVAL HISTORY  This is a 63 y.o.  female. Today, the patient complains of pain in the joints. Location: wrist, hand, hip, knee  Severity:  4 on a scale of 0-10  Timing: all day   Duration:  1 years  Context/Associated signs and symptoms: The patient was last seen 1/2017. She complains of intermittent pain and stiffness of her hands, mainly over her B/L CMC joints, that worsens with activity. She mentions some numbness by her left wrist that feels like a \"jellyfish sting. \" She mentions a previous tendon surgery of her left hand. She is currently taking Aleve for her pain. RHEUMATOLOGY REVIEW OF SYSTEMS   Positives as per history  Negatives as follows:  Shawn Lilibeth:  Denies unexplained persistent fevers or weight change  RESPIRATORY:  No pleuritic pain, exertional dyspnea  CARDIOVASCULAR:  Denies chest pain  GASTRO:   Denies heartburn, abdominal pain, nausea, vomiting, diarrhea  SKIN:    Denies rash   MSK:    No morning stiffness >1 hour, persistent joint swelling, persistent joint pain    PAST MEDICAL HISTORY  Reviewed with patient, significant changes in medical history - no    PHYSICAL EXAM  Blood pressure (!) 176/91, pulse 70, temperature 98.5 °F (36.9 °C), temperature source Oral, resp. rate 19, height 5' 5\" (1.651 m), weight 107 lb 3.2 oz (48.6 kg), SpO2 99 %. GENERAL APPEARANCE: Well-nourished, no acute distress  NECK: No adenopathy  ENT: No oral ulcers  CARDIOVASCULAR: Heart rhythm is regular. No murmur, rub, gallop  CHEST: Normal vesicular breath sounds. No wheezes, rales, pleural friction rubs  ABDOMINAL: The abdomen is soft and nontender.  Bowel sounds are normal  SKIN: No rash, palpable purpura, digital ulcer, abnormal thickening   MUSCULOSKELETAL:   Upper extremities - Heberden's and Sebastian's nodes. R Hand synovial thickening over MCP joints. R CMC, first IP tenderness. Right wrist fullness. Lower extremities - full range of motion, no tenderness, no swelling, no synovial thickening and no deformity of joints     LABS, RADIOLOGY AND PROCEDURES   Previous labs reviewed -Yes    ASSESSMENT  1. Inflammatory arthritis - (Established problem -  Progressive disease) - Some of her symptoms, mostly in her right hand, are consistent with an inflammatory arthritis. She is not currently taking medication for this. I will check labs and XRs of her hands; we will most likely start her on Naproxen. She should return in 2 months for a follow up. 2. Osteoarthritis - Most of the patient's pain is from her OA. ROM exercises are recommended for now. PLAN  1. Check CBC, CMP, markers of inflammation (ESR, CRP)  2. XR B/L Hands  3. Return in 2 months    Avis Valencia MD  Adult and Pediatric Rheumatology     [facility] and [address], [address] [place] [address], Phone [phone], Fax [phone]   E-mail: [email or web address]    Visiting  of Pediatrics    Department of Pediatrics, [facility] of [address] [place] [address], [address] [place] [address], Phone [phone], Fax [phone]  E-mail: [email or web address]    There are no Patient Instructions on file for this visit.     cc:  Hue Butts NP

## 2018-06-11 NOTE — MR AVS SNAPSHOT
[address] 
[phone] Patient: Valentina Zheng MRN: [id number] OKY:[birth year] Visit Information Date & Time Provider Department Dept. Phone Encounter #  
 6/11/2018  2:20 PM Kelsie Falcon MD [address] [facility] [phone] [id number] Follow-up Instructions Return in about 4 months (around 10/11/2018). Upcoming Health Maintenance Date Due COLONOSCOPY 8/22/1975 BREAST CANCER SCRN MAMMOGRAM 6/1/2017 ZOSTER VACCINE AGE 60> 6/22/2017 Influenza Age 5 to Adult 9/3/2018* MEDICARE YEARLY EXAM 4/24/2019 DTaP/Tdap/Td series (2 - Td) 10/30/2025 *Topic was postponed. The date shown is not the original due date. Allergies as of 6/11/2018  Review Complete On: 6/11/2018 By: Diogo Evangelista LPN No Known Allergies Current Immunizations  Reviewed on 5/2/2017 Name Date Influenza Vaccine 11/5/2014 Influenza Vaccine (Quad) 10/30/2015 10:27 AM  
 Pneumococcal Polysaccharide (PPSV-23) 5/2/2017 Tdap 10/30/2015 10:27 AM  
  
 Not reviewed this visit You Were Diagnosed With   
  
 Codes Comments Inflammatory arthritis    -  Primary ICD-10-CM: M19.90 ICD-9-CM: 714.9 Primary osteoarthritis of both hands     ICD-10-CM: M19.041, Z18.552 ICD-9-CM: 715.14 Vitals BP Pulse Temp Resp Height(growth percentile) Weight(growth percentile) (!) 176/91 (BP 1 Location: Right arm, BP Patient Position: Sitting) 70 98.5 °F (36.9 °C) (Oral) 19 5' 5\" (1.651 m) 107 lb 3.2 oz (48.6 kg) SpO2 BMI OB Status Smoking Status 99% 17.84 kg/m2 Hysterectomy Current Every Day Smoker Vitals History BMI and BSA Data Body Mass Index Body Surface Area  
 17.84 kg/m 2 1.49 m 2 Preferred Pharmacy Pharmacy Name Phone [address], [address] [place] [address] Rd. [phone] Your Updated Medication List  
 This list is accurate as of 6/11/18  3:35 PM.  Always use your most recent med list.  
  
  
  
  
 amphetamine-dextroamphetamine XR 30 mg XR capsule Commonly known as:  ADDERALL XR Take 1 Cap (30 mg total) by mouth every morningIndications: Attention-Deficit Hyperactivity Disorder Earliest Fill Date: 6/7/18. Max Daily Amount: 30 mg FLUoxetine 40 mg capsule Commonly known as:  PROzac Take 2 Caps by mouth daily. Indications: ANXIETY WITH DEPRESSION  
  
 lisinopril 10 mg tablet Commonly known as:  Burma Fairy Take 1 Tab by mouth daily. multivitamin tablet Commonly known as:  ONE A DAY Take 1 Tab by mouth daily. For women over 50  
  
 naproxen 500 mg tablet Commonly known as:  NAPROSYN Take 500 mg by mouth two (2) times daily (with meals). SALONPAS (CAPSAICIN/MENTHOL) 0.025-1.25 % Ptmd  
Generic drug:  Capsaicin-Menthol  
by Apply Externally route. triamterene-hydroCHLOROthiazide 37.5-25 mg per capsule Commonly known as:  Sujatha Cuadra TAKE ONE CAPSULE BY MOUTH DAILY FOR HYPERTENSION  Indications: hypertension We Performed the Following C REACTIVE PROTEIN, QT [29248 CPT(R)] CBC+PLATELET+HEM REVIEW [73307 CPT(R)] METABOLIC PANEL, COMPREHENSIVE [14137 CPT(R)] SED RATE (ESR) [id number] CPT(R)] Follow-up Instructions Return in about 4 months (around 10/11/2018). To-Do List   
 06/11/2018 Imaging:  XR HAND LT MIN 3 V   
  
 06/11/2018 Imaging:  XR HAND RT MIN 3 V Introducing [facility]! [employer] introduces [employer] patient portal. Now you can access parts of your medical record, email your doctor's office, and request medication refills online. 1. In your internet browser, go to [email or web address] 2. Click on the First Time User? Click Here link in the Sign In box. You will see the New Member Sign Up page. 3. Enter your [employer] Access Code exactly as it appears below.  You will not need to use this code after youve completed the sign-up process. If you do not sign up before the expiration date, you must request a new code. · iPipeline Access Code: [email or web address] Expires: 7/22/2018 11:28 AM 
 
4. Enter the last four digits of your Social Security Number (xxxx) and Date of Birth (mm/dd/yyyy) as indicated and click Submit. You will be taken to the next sign-up page. 5. Create a iPipeline ID. This will be your iPipeline login ID and cannot be changed, so think of one that is secure and easy to remember. 6. Create a [employer]Pipeline password. You can change your password at any time. 7. Enter your Password Reset Question and Answer. This can be used at a later time if you forget your password. 8. Enter your e-mail address. You will receive e-mail notification when new information is available in [address]. 9. Click Sign Up. You can now view and download portions of your medical record. 10. Click the Download Summary menu link to download a portable copy of your medical information. If you have questions, please visit the Frequently Asked Questions section of the iPipeline website. Remember, iPipeline is NOT to be used for urgent needs. For medical emergencies, dial 911. Now available from your iPhone and Android! Please provide this summary of care documentation to your next provider. Your primary care clinician is listed as Michelle Hein. If you have any questions after today's visit, please call [phone].

## 2018-06-13 LAB
ALBUMIN SERPL-MCNC: 4.4 G/DL (ref 3.6–4.8)
ALBUMIN/GLOB SERPL: 1.9 {RATIO} (ref 1.2–2.2)
ALP SERPL-CCNC: 42 IU/L (ref 39–117)
ALT SERPL-CCNC: 15 IU/L (ref 0–32)
AST SERPL-CCNC: 23 IU/L (ref 0–40)
BASOPHILS # BLD MANUAL: 0 X10E3/UL (ref 0–0.2)
BASOPHILS NFR BLD MANUAL: 0 %
BILIRUB SERPL-MCNC: <0.2 MG/DL (ref 0–1.2)
BUN SERPL-MCNC: 14 MG/DL (ref 8–27)
BUN/CREAT SERPL: 23 (ref 12–28)
CALCIUM SERPL-MCNC: 9.5 MG/DL (ref 8.7–10.3)
CHLORIDE SERPL-SCNC: 101 MMOL/L (ref 96–106)
CO2 SERPL-SCNC: 26 MMOL/L (ref 20–29)
CREAT SERPL-MCNC: 0.62 MG/DL (ref 0.57–1)
CRP SERPL-MCNC: 0.4 MG/L (ref 0–4.9)
DIFFERENTIAL COMMENT, 115260: ABNORMAL
EOSINOPHIL # BLD MANUAL: 0.1 X10E3/UL (ref 0–0.4)
EOSINOPHIL NFR BLD MANUAL: 1 %
ERYTHROCYTE [DISTWIDTH] IN BLOOD BY AUTOMATED COUNT: 13.3 % (ref 12.3–15.4)
ERYTHROCYTE [SEDIMENTATION RATE] IN BLOOD BY WESTERGREN METHOD: 2 MM/HR (ref 0–40)
GFR SERPLBLD CREATININE-BSD FMLA CKD-EPI: 113 ML/MIN/1.73
GFR SERPLBLD CREATININE-BSD FMLA CKD-EPI: 98 ML/MIN/1.73
GLOBULIN SER CALC-MCNC: 2.3 G/DL (ref 1.5–4.5)
GLUCOSE SERPL-MCNC: 80 MG/DL (ref 65–99)
HCT VFR BLD AUTO: 42.1 % (ref 34–46.6)
HGB BLD-MCNC: 13.9 G/DL (ref 11.1–15.9)
LYMPHOCYTES # BLD MANUAL: 1.9 X10E3/UL (ref 0.7–3.1)
LYMPHOCYTES NFR BLD MANUAL: 33 %
MCH RBC QN AUTO: 32.5 PG (ref 26.6–33)
MCHC RBC AUTO-ENTMCNC: 33 G/DL (ref 31.5–35.7)
MCV RBC AUTO: 98 FL (ref 79–97)
MONOCYTES # BLD MANUAL: 0.5 X10E3/UL (ref 0.1–0.9)
MONOCYTES NFR BLD MANUAL: 9 %
NEUTROPHILS # BLD MANUAL: 3.3 X10E3/UL (ref 1.4–7)
NEUTROPHILS NFR BLD MANUAL: 57 %
PLATELET # BLD AUTO: 308 X10E3/UL (ref 150–379)
PLATELET BLD QL SMEAR: ADEQUATE
POTASSIUM SERPL-SCNC: 4.4 MMOL/L (ref 3.5–5.2)
PROT SERPL-MCNC: 6.7 G/DL (ref 6–8.5)
RBC # BLD AUTO: 4.28 X10E6/UL (ref 3.77–5.28)
RBC MORPH BLD: ABNORMAL
SODIUM SERPL-SCNC: 143 MMOL/L (ref 134–144)
WBC # BLD AUTO: 5.8 X10E3/UL (ref 3.4–10.8)

## 2018-06-20 ENCOUNTER — TELEPHONE (OUTPATIENT)
Dept: RHEUMATOLOGY | Age: 63
End: 2018-06-20

## 2018-06-20 NOTE — TELEPHONE ENCOUNTER
----- Message from Melva Tamayo MD sent at 6/19/2018 10:29 PM EDT -----  Please let her know that the x-rays are same as before. I recommend she start naprosyn twice a day for her joints.

## 2018-06-20 NOTE — TELEPHONE ENCOUNTER
Spoke to pt informed pt per Dr Everett Stewart that her xrays were the same, and that she should start taking naprosyn BID for her joints, pt verbally acknowledged understanding

## 2018-07-10 DIAGNOSIS — I10 BENIGN ESSENTIAL HYPERTENSION: ICD-10-CM

## 2018-07-10 RX ORDER — LISINOPRIL 10 MG/1
10 TABLET ORAL DAILY
Qty: 90 TAB | Refills: 1 | Status: SHIPPED | OUTPATIENT
Start: 2018-07-10 | End: 2019-01-07 | Stop reason: SDUPTHER

## 2018-07-15 ENCOUNTER — HOSPITAL ENCOUNTER (EMERGENCY)
Age: 63
Discharge: HOME | End: 2018-07-15
Attending: EMERGENCY MEDICINE
Payer: MEDICARE

## 2018-07-15 VITALS
RESPIRATION RATE: 14 BRPM | TEMPERATURE: 98.1 F | SYSTOLIC BLOOD PRESSURE: 130 MMHG | HEART RATE: 93 BPM | DIASTOLIC BLOOD PRESSURE: 76 MMHG | BODY MASS INDEX: 17.68 KG/M2 | WEIGHT: 110 LBS | HEIGHT: 66 IN | OXYGEN SATURATION: 96 %

## 2018-07-15 DIAGNOSIS — R00.2 PALPITATIONS: Primary | ICD-10-CM

## 2018-07-15 LAB
ALBUMIN SERPL-MCNC: 3.9 G/DL (ref 3.5–5)
ALBUMIN/GLOB SERPL: 1.2 {RATIO} (ref 1.1–2.2)
ALP SERPL-CCNC: 50 U/L (ref 45–117)
ALT SERPL-CCNC: 25 U/L (ref 12–78)
ANION GAP SERPL CALC-SCNC: 5 MMOL/L (ref 5–15)
AST SERPL-CCNC: 15 U/L (ref 15–37)
BASOPHILS # BLD: 0 K/UL (ref 0–0.1)
BASOPHILS NFR BLD: 0 % (ref 0–1)
BILIRUB SERPL-MCNC: 0.4 MG/DL (ref 0.2–1)
BUN SERPL-MCNC: 22 MG/DL (ref 6–20)
BUN/CREAT SERPL: 27 (ref 12–20)
CALCIUM SERPL-MCNC: 8.9 MG/DL (ref 8.5–10.1)
CHLORIDE SERPL-SCNC: 102 MMOL/L (ref 97–108)
CO2 SERPL-SCNC: 30 MMOL/L (ref 21–32)
CREAT SERPL-MCNC: 0.83 MG/DL (ref 0.55–1.02)
DIFFERENTIAL METHOD BLD: ABNORMAL
EOSINOPHIL # BLD: 0 K/UL (ref 0–0.4)
EOSINOPHIL NFR BLD: 0 % (ref 0–7)
ERYTHROCYTE [DISTWIDTH] IN BLOOD BY AUTOMATED COUNT: 11.9 % (ref 11.5–14.5)
GLOBULIN SER CALC-MCNC: 3.3 G/DL (ref 2–4)
GLUCOSE SERPL-MCNC: 112 MG/DL (ref 65–100)
HCT VFR BLD AUTO: 41.2 % (ref 35–47)
HGB BLD-MCNC: 13.7 G/DL (ref 11.5–16)
IMM GRANULOCYTES # BLD: 0 K/UL (ref 0–0.04)
IMM GRANULOCYTES NFR BLD AUTO: 0 % (ref 0–0.5)
LYMPHOCYTES # BLD: 1.9 K/UL (ref 0.8–3.5)
LYMPHOCYTES NFR BLD: 27 % (ref 12–49)
MCH RBC QN AUTO: 32.7 PG (ref 26–34)
MCHC RBC AUTO-ENTMCNC: 33.3 G/DL (ref 30–36.5)
MCV RBC AUTO: 98.3 FL (ref 80–99)
MONOCYTES # BLD: 0.7 K/UL (ref 0–1)
MONOCYTES NFR BLD: 10 % (ref 5–13)
NEUTS SEG # BLD: 4.4 K/UL (ref 1.8–8)
NEUTS SEG NFR BLD: 62 % (ref 32–75)
NRBC # BLD: 0 K/UL (ref 0–0.01)
NRBC BLD-RTO: 0 PER 100 WBC
PLATELET # BLD AUTO: 275 K/UL (ref 150–400)
PMV BLD AUTO: 8.8 FL (ref 8.9–12.9)
POTASSIUM SERPL-SCNC: 3.9 MMOL/L (ref 3.5–5.1)
PROT SERPL-MCNC: 7.2 G/DL (ref 6.4–8.2)
RBC # BLD AUTO: 4.19 M/UL (ref 3.8–5.2)
SODIUM SERPL-SCNC: 137 MMOL/L (ref 136–145)
TROPONIN I SERPL-MCNC: <0.05 NG/ML
WBC # BLD AUTO: 7.1 K/UL (ref 3.6–11)

## 2018-07-15 PROCEDURE — 85025 COMPLETE CBC W/AUTO DIFF WBC: CPT | Performed by: PHYSICIAN ASSISTANT

## 2018-07-15 PROCEDURE — 93005 ELECTROCARDIOGRAM TRACING: CPT

## 2018-07-15 PROCEDURE — 80053 COMPREHEN METABOLIC PANEL: CPT | Performed by: PHYSICIAN ASSISTANT

## 2018-07-15 PROCEDURE — 84484 ASSAY OF TROPONIN QUANT: CPT | Performed by: PHYSICIAN ASSISTANT

## 2018-07-15 PROCEDURE — 99285 EMERGENCY DEPT VISIT HI MDM: CPT

## 2018-07-15 PROCEDURE — 36415 COLL VENOUS BLD VENIPUNCTURE: CPT | Performed by: PHYSICIAN ASSISTANT

## 2018-07-15 NOTE — ED TRIAGE NOTES
Pt reports feeling like her heart is racing. Symptoms began this morning while in [place].   Pt denies chest pain or SOB but states \"it feels like my heart is beating out of my chest.\"

## 2018-07-15 NOTE — DISCHARGE INSTRUCTIONS
Palpitations: Care Instructions  Your Care Instructions    Heart palpitations are the uncomfortable sensation that your heart is beating fast or irregularly. You might feel pounding or fluttering in your chest. It might feel like your heart is skipping a beat. Although palpitations may be caused by a heart problem, they also occur because of stress, fatigue, or use of alcohol, caffeine, or nicotine. Many medicines, including diet pills, antihistamines, decongestants, and some herbal products, can cause heart palpitations. Nearly everyone has palpitations from time to time. Depending on your symptoms, your doctor may need to do more tests to try to find the cause of your palpitations. Follow-up care is a key part of your treatment and safety. Be sure to make and go to all appointments, and call your doctor if you are having problems. It's also a good idea to know your test results and keep a list of the medicines you take. How can you care for yourself at home? · Avoid caffeine, nicotine, and excess alcohol. · Do not take illegal drugs, such as methamphetamines and cocaine. · Do not take weight loss or diet medicines unless you talk with your doctor first.  · Get plenty of sleep. · Do not overeat. · If you have palpitations again, take deep breaths and try to relax. This may slow a racing heart. · If you start to feel lightheaded, lie down to avoid injuries that might result if you pass out and fall down. · Keep a record of your palpitations and bring it to your next doctor's appointment. Write down:  ¨ The date and time. ¨ Your pulse. (If your heart is beating fast, it may be hard to count your pulse.)  ¨ What you were doing when the palpitations started. ¨ How long the palpitations lasted. ¨ Any other symptoms. · If an activity causes palpitations, slow down or stop. Talk to your doctor before you do that activity again. · Take your medicines exactly as prescribed.  Call your doctor if you think you are having a problem with your medicine. When should you call for help? Call 911 anytime you think you may need emergency care. For example, call if:    · You passed out (lost consciousness).     · You have symptoms of a heart attack. These may include:  ¨ Chest pain or pressure, or a strange feeling in the chest.  ¨ Sweating. ¨ Shortness of breath. ¨ Pain, pressure, or a strange feeling in the back, neck, jaw, or upper belly or in one or both shoulders or arms. ¨ Lightheadedness or sudden weakness. ¨ A fast or irregular heartbeat. After you call 911, the  may tell you to chew 1 adult-strength or 2 to 4 low-dose aspirin. Wait for an ambulance. Do not try to drive yourself.     · You have symptoms of a stroke. These may include:  ¨ Sudden numbness, tingling, weakness, or loss of movement in your face, arm, or leg, especially on only one side of your body. ¨ Sudden vision changes. ¨ Sudden trouble speaking. ¨ Sudden confusion or trouble understanding simple statements. ¨ Sudden problems with walking or balance. ¨ A sudden, severe headache that is different from past headaches.    Call your doctor now or seek immediate medical care if:    · You have heart palpitations and:  ¨ Are dizzy or lightheaded, or you feel like you may faint. ¨ Have new or increased shortness of breath.    Watch closely for changes in your health, and be sure to contact your doctor if:    · You continue to have heart palpitations. Where can you learn more? Go to http://farooq-heather.info/. Enter R508 in the search box to learn more about \"Palpitations: Care Instructions. \"  Current as of: December 6, 2017  Content Version: 11.7  © 4074-5338 [employer]. Care instructions adapted under license by [employer] (which disclaims liability or warranty for this information).  If you have questions about a medical condition or this instruction, always ask your healthcare professional. [employer] disclaims any warranty or liability for your use of this information.

## 2018-07-16 ENCOUNTER — OFFICE VISIT (OUTPATIENT)
Dept: INTERNAL MEDICINE CLINIC | Age: 63
End: 2018-07-16

## 2018-07-16 ENCOUNTER — HOSPITAL ENCOUNTER (OUTPATIENT)
Dept: LAB | Age: 63
Discharge: HOME | End: 2018-07-16
Payer: MEDICARE

## 2018-07-16 VITALS
HEART RATE: 86 BPM | BODY MASS INDEX: 16.52 KG/M2 | OXYGEN SATURATION: 96 % | WEIGHT: 102.8 LBS | RESPIRATION RATE: 18 BRPM | HEIGHT: 66 IN | SYSTOLIC BLOOD PRESSURE: 122 MMHG | TEMPERATURE: 98.1 F | DIASTOLIC BLOOD PRESSURE: 72 MMHG

## 2018-07-16 DIAGNOSIS — F41.1 GENERALIZED ANXIETY DISORDER: ICD-10-CM

## 2018-07-16 DIAGNOSIS — F17.200 TOBACCO DEPENDENCE: ICD-10-CM

## 2018-07-16 DIAGNOSIS — Z82.49 FAMILY HISTORY OF HEART DISEASE: ICD-10-CM

## 2018-07-16 DIAGNOSIS — F15.20 CAFFEINE DEPENDENCE (HCC): ICD-10-CM

## 2018-07-16 DIAGNOSIS — R00.2 PALPITATIONS: Primary | ICD-10-CM

## 2018-07-16 DIAGNOSIS — R63.6 UNDERWEIGHT: ICD-10-CM

## 2018-07-16 DIAGNOSIS — I10 BENIGN ESSENTIAL HYPERTENSION: ICD-10-CM

## 2018-07-16 DIAGNOSIS — R94.31 ABNORMAL EKG: ICD-10-CM

## 2018-07-16 DIAGNOSIS — F32.A DEPRESSION, UNSPECIFIED DEPRESSION TYPE: ICD-10-CM

## 2018-07-16 LAB
ATRIAL RATE: 107 BPM
CALCULATED P AXIS, ECG09: 81 DEGREES
CALCULATED R AXIS, ECG10: 77 DEGREES
CALCULATED T AXIS, ECG11: 67 DEGREES
DIAGNOSIS, 93000: NORMAL
P-R INTERVAL, ECG05: 134 MS
Q-T INTERVAL, ECG07: 358 MS
QRS DURATION, ECG06: 90 MS
QTC CALCULATION (BEZET), ECG08: 477 MS
VENTRICULAR RATE, ECG03: 107 BPM

## 2018-07-16 PROCEDURE — 80061 LIPID PANEL: CPT

## 2018-07-16 PROCEDURE — 84443 ASSAY THYROID STIM HORMONE: CPT

## 2018-07-16 PROCEDURE — 36415 COLL VENOUS BLD VENIPUNCTURE: CPT

## 2018-07-16 RX ORDER — SERTRALINE HYDROCHLORIDE 50 MG/1
50 TABLET, FILM COATED ORAL DAILY
Qty: 30 TAB | Refills: 1 | Status: SHIPPED | OUTPATIENT
Start: 2018-07-16 | End: 2018-08-29 | Stop reason: SDUPTHER

## 2018-07-16 NOTE — MR AVS SNAPSHOT
[address] [facility], [address] [place] [address] 
[phone] Patient: Eve Encarnacion MRN: [id number] DELFINO:[birth year]8 Visit Information Date & Time Provider Department Dept. Phone Encounter #  
 7/16/2018  9:40 AM [facility] [phone] [id number] Follow-up Instructions Return in about 4 weeks (around 8/13/2018) for f/u palpitaitons and anxiety. Your Appointments 10/15/2018  9:00 AM  
ESTABLISHED PATIENT with Grey Hanson MD  
[address] ([facility]) Appt Note: 4 month f/up  
 [address] [facility] [address]  
[phone]  
  
   
 [address] [facility] [address] [address] Upcoming Health Maintenance Date Due COLONOSCOPY 8/22/1975 BREAST CANCER SCRN MAMMOGRAM 6/1/2017 ZOSTER VACCINE AGE 60> 6/22/2017 Influenza Age 5 to Adult 9/3/2018* MEDICARE YEARLY EXAM 4/24/2019 DTaP/Tdap/Td series (2 - Td) 10/30/2025 *Topic was postponed. The date shown is not the original due date. Allergies as of 7/16/2018  Review Complete On: 7/16/2018 By: Shabnam Alatorre NP No Known Allergies Current Immunizations  Reviewed on 5/2/2017 Name Date Influenza Vaccine 11/5/2014 Influenza Vaccine (Quad) 10/30/2015 10:27 AM  
 Pneumococcal Polysaccharide (PPSV-23) 5/2/2017 Tdap 10/30/2015 10:27 AM  
  
 Not reviewed this visit You Were Diagnosed With   
  
 Codes Comments Palpitations    -  Primary ICD-10-CM: R00.2 ICD-9-CM: 785.1 Abnormal EKG     ICD-10-CM: R94.31 
ICD-9-CM: 794.31 Benign essential hypertension     ICD-10-CM: I10 
ICD-9-CM: 401.1 Generalized anxiety disorder     ICD-10-CM: F41.1 ICD-9-CM: 300.02 Tobacco dependence     ICD-10-CM: H36.790 ICD-9-CM: 305.1 Caffeine dependence (HCC)     ICD-10-CM: L31.22 ICD-9-CM: 304.40 Family history of heart disease     ICD-10-CM: Z82.49 
ICD-9-CM: V17.49 Underweight     ICD-10-CM: R63.6 ICD-9-CM: 783.22 Family history of diabetes mellitus in father     ICD-10-CM: Z80.1 ICD-9-CM: V18.0 Depression, unspecified depression type     ICD-10-CM: F32.9 ICD-9-CM: 345 Vitals BP Pulse Temp Resp Height(growth percentile) Weight(growth percentile) 122/72 (BP 1 Location: Right arm, BP Patient Position: Sitting) 86 98.1 °F (36.7 °C) (Oral) 18 5' 6\" (1.676 m) 102 lb 12.8 oz (46.6 kg) SpO2 BMI OB Status Smoking Status 96% 16.59 kg/m2 Hysterectomy Current Every Day Smoker Vitals History BMI and BSA Data Body Mass Index Body Surface Area  
 16.59 kg/m 2 1.47 m 2 Preferred Pharmacy Pharmacy Name Phone [address] [facility] [employer] [phone] Your Updated Medication List  
  
   
This list is accurate as of 7/16/18 10:24 AM.  Always use your most recent med list.  
  
  
  
  
 lisinopril 10 mg tablet Commonly known as:  Robertha Roup Take 1 Tab by mouth daily. multivitamin tablet Commonly known as:  ONE A DAY Take 1 Tab by mouth daily. For women over 50  
  
 naproxen 500 mg tablet Commonly known as:  NAPROSYN Take 500 mg by mouth two (2) times daily (with meals). sertraline 50 mg tablet Commonly known as:  ZOLOFT Take 1 Tab by mouth daily. triamterene-hydroCHLOROthiazide 37.5-25 mg per capsule Commonly known as:  Fiorella Brady TAKE ONE CAPSULE BY MOUTH DAILY FOR HYPERTENSION  Indications: hypertension Prescriptions Sent to Pharmacy Refills  
 sertraline (ZOLOFT) 50 mg tablet 1 Sig: Take 1 Tab by mouth daily. Class: Normal  
 Pharmacy: [address] [facility], [address] [facility] #: [phone] Route: Oral  
  
We Performed the Following LIPID PANEL [25291 CPT(R)] REFERRAL TO PSYCHIATRY [REF91 Custom] Comments:  
 Call and schedule with NP Lenard Mukherjee for anxiety and depression [facility], [address] [place] Phone: [phone] TSH RFX ON ABNORMAL TO FREE T4 [[id number] Custom] Follow-up Instructions Return in about 4 weeks (around 8/13/2018) for f/u palpitaitons and anxiety. To-Do List   
 07/16/2018 ECG:  CARDIAC HOLTER MONITOR, 24 HOURS   
  
 07/23/2018 ECHO:  ECHO TTE STRESS COMP W OR WO CONTR Referral Information Referral ID Referred By Referred To  
  
 [id number] Saida LEAVITT Not Available Visits Status Start Date End Date 1 New Request 7/16/18 7/16/19 If your referral has a status of pending review or denied, additional information will be sent to support the outcome of this decision. Patient Instructions Start sertraline (zoloft) 50mg, 1 pill once daily Call and schedule an appointment with Psychiatric Nurse Practitioner Lenard Mukherjee Schedule your cardiac Holter Monitor and your Stress Echocardiogram  
[phone] is the scheduling phone number Avoid caffeine and alcohol Make sure to drink plenty of water, adequate hydration Introducing [facility] & HEALTH SERVICES! [facility] introduces [employer] patient portal. Now you can access parts of your medical record, email your doctor's office, and request medication refills online. 1. In your internet browser, go to [email or web address] 2. Click on the First Time User? Click Here link in the Sign In box. You will see the New Member Sign Up page. 3. Enter your In[employer] Access Code exactly as it appears below. You will not need to use this code after youve completed the sign-up process. If you do not sign up before the expiration date, you must request a new code. · In[employer] Access Code: [email or web address] Expires: 7/22/2018 11:28 AM 
 
4.  Enter the last four digits of your Social Security Number (xxxx) and Date of Birth (mm/dd/yyyy) as indicated and click Submit. You will be taken to the next sign-up page. 5. Create a [employer] ID. This will be your [employer] login ID and cannot be changed, so think of one that is secure and easy to remember. 6. Create a [employer] password. You can change your password at any time. 7. Enter your Password Reset Question and Answer. This can be used at a later time if you forget your password. 8. Enter your e-mail address. You will receive e-mail notification when new information is available in [address] 9. Click Sign Up. You can now view and download portions of your medical record. 10. Click the Download Summary menu link to download a portable copy of your medical information. If you have questions, please visit the Frequently Asked Questions section of the [employer] website. Remember, [employer] is NOT to be used for urgent needs. For medical emergencies, dial 911. Now available from your iPhone and Android! Please provide this summary of care documentation to your next provider. Your primary care clinician is listed as Brielle Gonzales. If you have any questions after today's visit, please call [phone].

## 2018-07-16 NOTE — PROGRESS NOTES
Subjective:      Dhara Concepcion is a 63 y.o. female who presents today for f/u palpitations    Patient is difficult to get a history from, is quite scattered in her thinking    Was in the Emergency Department yesterday for evaluation of palpitations  Per ED note, patient reported palpitations intermittently x 1 week or so. Had recently run out of BP medications and had been doubling her dosage prior to onset of palpitations  Taking triamterene-hctz and lisinopril  EKG showing sinus tachycardia with pvs, biatrial enlargement    Reports that she drinks lots of water and seltzer water  Has been cutting back on the caffeine because notices that \"it affects me more\".   Drinking black coffee- drinks 2 cups of coffee a day    Occasional lightheadedness if stands up too quickly, none otherwise  No chest pain or pressure  No dyspnea  No abdominal pain    + family history of heart disease on mothers side of the family    History of mood disorder, anxiety, depression:  Not taking prozac, ran out (or lost?)  Would like to switch to zoloft as did not think prozac did much for her and several friends of hers do well with zoloft  Used to follow with psychiatrist Dr. Sue Sutherland, has not reestablished with another provider since Dr. 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 30 Tab 1    lisinopril (PRINIVIL, ZESTRIL) 10 mg tablet Take 1 Tab by mouth daily. 90 Tab 1    naproxen (NAPROSYN) 500 mg tablet Take 500 mg by mouth two (2) times daily (with meals).  multivitamin (ONE A DAY) tablet Take 1 Tab by mouth daily. For women over 50      triamterene-hydroCHLOROthiazide (DYAZIDE) 37.5-25 mg per capsule TAKE ONE CAPSULE BY MOUTH DAILY FOR HYPERTENSION  Indications: hypertension 90 Cap 1        Review of Systems    Pertinent items are noted in HPI. Objective:     Visit Vitals    /72 (BP 1 Location: Right arm, BP Patient Position: Sitting)    Pulse 86    Temp 98.1 °F (36.7 °C) (Oral)    Resp 18    Ht 5' 6\" (1.676 m)    Wt 102 lb 12.8 oz (46.6 kg)    SpO2 96%    BMI 16.59 kg/m2     General appearance: alert, cooperative, no distress  Head: Normocephalic, without obvious abnormality, atraumatic  Lungs: clear to auscultation bilaterally  Heart: regular rate and rhythm  Extremities: extremities normal, atraumatic, steady gait  Skin: Skin color, texture, turgor normal  Neurologic: Grossly normal  Psych: scattered thinking, not overly anxious, nonsuicidal    Assessment/Plan:     1. Palpitations  - ensure adequate hydration, avoid caffeine  - control anxiety, start sertraline  - TSH RFX ON ABNORMAL TO FREE T4  - ECHO TTE STRESS COMP W OR WO CONTR; Future  - CARDIAC HOLTER MONITOR, 24 HOURS; Future    2. Abnormal EKG  - as above  - ECHO TTE STRESS COMP W OR WO CONTR; Future  - CARDIAC HOLTER MONITOR, 24 HOURS; Future    3. Benign essential hypertension  - cont current meds  - ensure adequate hydration, avoid caffeine    4. Generalized anxiety disorder  - hold prozac, start sertraline  - sertraline (ZOLOFT) 50 mg tablet; Take 1 Tab by mouth daily. Dispense: 30 Tab; Refill: 1  - REFERRAL TO PSYCHIATRY    5. Depression, unspecified depression type  - as above  - REFERRAL TO PSYCHIATRY    6.  Tobacco dependence  - not ready to quit    7. Caffeine dependence (HCC)  - reduce caffeine intake, discussed relationship of palpitations and caffene    8. Family history of heart disease  - LIPID PANEL  - ECHO TTE STRESS COMP W OR WO CONTR; Future    9. Underweight  - TSH RFX ON ABNORMAL TO FREE T4      Advised her to call back or return to office if symptoms worsen/change/persist.  Discussed expected course/resolution/complications of diagnosis in detail with patient. Medication risks/benefits/costs/interactions/alternatives discussed with patient. She was given an after visit summary which includes diagnoses, current medications, & vitals. She expressed understanding with the diagnosis and plan.     MAURO Lima

## 2018-07-16 NOTE — PATIENT INSTRUCTIONS
Start sertraline (zoloft) 50mg, 1 pill once daily  Call and schedule an appointment with Psychiatric Nurse Practitioner Enid Masy    Schedule your cardiac Holter Monitor and your Stress Echocardiogram   [phone] is the scheduling phone number    Avoid caffeine and alcohol  Make sure to drink plenty of water, adequate hydration

## 2018-07-16 NOTE — PROGRESS NOTES
Sandra Saunders is a 63 y.o. female  Chief Complaint   Patient presents with   [facility] Follow Up     seen at [address] [place] [address] heart rate was found to be irregular    Request For New Medication     wants to switch from prozac to zoloft     Visit Vitals    /72 (BP 1 Location: Right arm, BP Patient Position: Sitting)    Pulse 86    Temp 98.1 °F (36.7 °C) (Oral)    Resp 18    Ht 5' 6\" (1.676 m)    Wt 102 lb 12.8 oz (46.6 kg)    SpO2 96%    BMI 16.59 kg/m2     1. Have you been to the ER, urgent care clinic since your last visit? Hospitalized since your last visit? [facility] visit on 15 July 2018    2. Have you seen or consulted any other health care providers outside of the [address] [place] [address] since your last visit? Include any pap smears or colon screening.  No  Health Maintenance Due   Topic Date Due    COLONOSCOPY  1975    BREAST CANCER SCRN MAMMOGRAM  2017    ZOSTER VACCINE AGE 60>  2017

## 2018-07-17 ENCOUNTER — TELEPHONE (OUTPATIENT)
Dept: INTERNAL MEDICINE CLINIC | Age: 63
End: 2018-07-17

## 2018-07-17 LAB
CHOLEST SERPL-MCNC: 251 MG/DL (ref 100–199)
HDLC SERPL-MCNC: 81 MG/DL
INTERPRETATION, 910389: NORMAL
LDLC SERPL CALC-MCNC: 150 MG/DL (ref 0–99)
TRIGL SERPL-MCNC: 100 MG/DL (ref 0–149)
TSH SERPL DL<=0.005 MIU/L-ACNC: 1.47 UIU/ML (ref 0.45–4.5)
VLDLC SERPL CALC-MCNC: 20 MG/DL (ref 5–40)

## 2018-07-17 NOTE — TELEPHONE ENCOUNTER
Called patient regarding lab work    LDL not at goal    Has family history of heart disease    No answer    LVM requesting return call    Delio Lu NP

## 2018-07-19 NOTE — TELEPHONE ENCOUNTER
Called patient to review lab work    No answer    This is the second voicemail I have left for patient regarding this    Will send letter in the mail    Shabnam Alatorre NP

## 2018-07-19 NOTE — TELEPHONE ENCOUNTER
Returned call to patient    Encouraged smoking cessation    Reviewed diet recommendations, eats a lot of cheese- will reduce    Patient refused cholesterol medication at this time, wants to recheck in 6 months and then rediscuss    Patient verbalized understanding of results and plan    Rashaad Strong, NP

## 2018-08-14 ENCOUNTER — OFFICE VISIT (OUTPATIENT)
Dept: INTERNAL MEDICINE CLINIC | Age: 63
End: 2018-08-14

## 2018-08-14 VITALS
DIASTOLIC BLOOD PRESSURE: 70 MMHG | TEMPERATURE: 98.2 F | HEART RATE: 86 BPM | HEIGHT: 66 IN | BODY MASS INDEX: 16.88 KG/M2 | WEIGHT: 105 LBS | SYSTOLIC BLOOD PRESSURE: 128 MMHG | RESPIRATION RATE: 16 BRPM | OXYGEN SATURATION: 98 %

## 2018-08-14 DIAGNOSIS — F90.9 ATTENTION DEFICIT HYPERACTIVITY DISORDER (ADHD), UNSPECIFIED ADHD TYPE: ICD-10-CM

## 2018-08-14 DIAGNOSIS — F39 MOOD DISORDER (HCC): Primary | ICD-10-CM

## 2018-08-14 DIAGNOSIS — R00.2 PALPITATIONS: ICD-10-CM

## 2018-08-14 NOTE — MR AVS SNAPSHOT
[address] [facility] [address] [facility] 
[phone] Patient: Ximena Danielle MRN: [id number] DDU:[birth year] Visit Information Date & Time Provider Department Dept. Phone Encounter #  
 8/14/2018 10:00 AM [facility]ON [facility]  [facility]ists [phone] [id number] Follow-up Instructions Return in about 4 months (around 12/14/2018), or if symptoms worsen or fail to improve, for f/u depression and anxiety, palpitations. Your Appointments 8/30/2018  9:30 AM  
Office Visit with Zachary Jenkins NP [facility]) Appt Note: transfer from [address] told to arrive 20 mins early; R/s transfer from [address] told to arrive 20 mins earlyRR  
 [address] [facility] [address] [facility]  
[phone]  
  
   
 [address] [place] [address] [address] [place] [address]  
  
    
 10/15/2018  9:00 AM  
ESTABLISHED PATIENT with Jayashree Marcial MD  
[facility]) Appt Note: 4 month f/up; . .... [facility] [address]  
[address] [place] [id number] Upcoming Health Maintenance Date Due COLONOSCOPY 8/22/1975 BREAST CANCER SCRN MAMMOGRAM 6/1/2017 ZOSTER VACCINE AGE 60> 6/22/2017 Influenza Age 5 to Adult 9/3/2018* MEDICARE YEARLY EXAM 4/24/2019 DTaP/Tdap/Td series (2 - Td) 10/30/2025 *Topic was postponed. The date shown is not the original due date. Allergies as of 8/14/2018  Review Complete On: 8/14/2018 By: Brittanie Guo NP No Known Allergies Current Immunizations  Reviewed on 5/2/2017 Name Date Influenza Vaccine 11/5/2014 Influenza Vaccine (Quad) 10/30/2015 10:27 AM  
 Pneumococcal Polysaccharide (PPSV-23) 5/2/2017 Tdap 10/30/2015 10:27 AM  
  
 Not reviewed this visit You Were Diagnosed With   
  
 Codes Comments Mood disorder ([facility]    -  Primary ICD-10-CM: F39 
ICD-9-CM: 296.90 Palpitations     ICD-10-CM: R00.2 ICD-9-CM: 785.1 Attention deficit hyperactivity disorder (ADHD), unspecified ADHD type     ICD-10-CM: F90.9 ICD-9-CM: 314.01 Vitals BP Pulse Temp Resp Height(growth percentile) Weight(growth percentile) 128/70 (BP 1 Location: Right arm, BP Patient Position: Sitting) 86 98.2 °F (36.8 °C) (Oral) 16 5' 6\" (1.676 m) 105 lb (47.6 kg) SpO2 BMI OB Status Smoking Status 98% 16.95 kg/m2 Hysterectomy Current Every Day Smoker Vitals History BMI and BSA Data Body Mass Index Body Surface Area  
 16.95 kg/m 2 1.49 m 2 Preferred Pharmacy Pharmacy Name Phone [employer] [address] [place] [address] Rd. [phone] Your Updated Medication List  
  
   
This list is accurate as of 8/14/18 10:35 AM.  Always use your most recent med list.  
  
  
  
  
 lisinopril 10 mg tablet Commonly known as:  Karissa Yu Take 1 Tab by mouth daily. multivitamin tablet Commonly known as:  ONE A DAY Take 1 Tab by mouth daily. For women over 50  
  
 naproxen 500 mg tablet Commonly known as:  NAPROSYN Take 500 mg by mouth two (2) times daily (with meals). sertraline 50 mg tablet Commonly known as:  ZOLOFT Take 1 Tab by mouth daily. triamterene-hydroCHLOROthiazide 37.5-25 mg per capsule Commonly known as:  Feliberto Hoof TAKE ONE CAPSULE BY MOUTH DAILY FOR HYPERTENSION  Indications: hypertension Follow-up Instructions Return in about 4 months (around 12/14/2018), or if symptoms worsen or fail to improve, for f/u depression and anxiety, palpitations. To-Do List   
 08/27/2018 11:30 AM  
(Arrive by 11:00 AM) Appointment with CARDIOLOGIST [facility]; STRESS ECHO LAB [facility] NON-INVASIVE CARD ([phone]) 1. Do not eat or drink after midnight.   2.  The patient should arrive for the test wearing comfortable clothes suitable for exercise and flat, rubber-soled shoes, preferably athletic type. 3.  Medications (except for diabetes) may be taken with a small amount of water. Call your physician with questions. 4.  Please bring a list of all current medications. 5.  The patient will be walked on a treadmill by the Cardiologist.  6. Hold cardiac medications and BETA blockers for 24 hours. Call your physician with questions. 08/27/2018 1:30 PM  
(Arrive by 1:00 PM) Appointment with HOLTER/EVENT/PM LAB [facility] NON-INVASIVE CARD ([phone]) Patient Instructions Keep your Stress Echocardiogram appointment and your Holter Monitor Keep appointment with Psych NP Anirudh Tam Attention Deficit Hyperactivity Disorder (ADHD) in Adults: Care Instructions Your Care Instructions Attention deficit hyperactivity disorder, or ADHD, is a condition that makes it hard to pay attention. So you may have problems when you try to focus, get organized, and finish tasks. It might make you more active than other people. Or you might do things without thinking first. 
ADHD is very common. It usually starts in early childhood. Many adults don't realize they have it until their children are diagnosed. Then they become aware of their own symptoms. Doctors don't know what causes ADHD. But it often runs in families. ADHD can be treated with medicines, behavior training, and counseling. Treatment can improve your life. Follow-up care is a key part of your treatment and safety. Be sure to make and go to all appointments, and call your doctor if you are having problems. It's also a good idea to know your test results and keep a list of the medicines you take. How can you care for yourself at home? · Learn all you can about ADHD. This will help you and your family understand it better. · Take your medicines exactly as prescribed.  Call your doctor if you think you are having a problem with your medicine. You will get more details on the specific medicines your doctor prescribes. · If you miss a dose of your medicine, do not take an extra dose. · If your doctor suggests counseling, find a counselor you like and trust. Talk openly and honestly. Be willing to make some changes. · Find a support group for adults with ADHD. Talking to others with the same problems can help you feel better. It can also give you ideas about how to best cope with the condition. · Get rid of distractions at your work space. Keep your desk clean. Try not to face a window or busy hallway. · Use files, planners, and other tools to keep you organized. · Limit use of alcohol, and do not use illegal drugs. People with ADHD tend to become addicted more easily than others. Tell your doctor if you need help to quit. Counseling, support groups, and sometimes medicines can help you stay free of alcohol or drugs. · Get at least 30 minutes of physical activity on most days of the week. Exercise has been shown to help people cope with ADHD. Walking is a good choice. You also may want to do other activities, such as running, swimming, cycling, or playing tennis or team sports. When should you call for help? Watch closely for changes in your health, and be sure to contact your doctor if: 
  · You feel sad a lot or cry all the time.  
  · You have trouble sleeping, or you sleep too much.  
  · You find it hard to concentrate, make decisions, or remember things.  
  · You change how you normally eat.  
  · You feel guilty for no reason. Where can you learn more? Go to http://farooq-heather.info/. Enter B196 in the search box to learn more about \"Attention Deficit Hyperactivity Disorder (ADHD) in Adults: Care Instructions. \" Current as of: December 7, 2017 Content Version: 11.7 © 1958-3412 [employer].  Care instructions adapted under license by [address] (which disclaims liability or warranty for this information). If you have questions about a medical condition or this instruction, always ask your healthcare professional. [address] any warranty or liability for your use of this information. Introducing [facility] [place] [facility] & HEALTH SERVICES! [facility] introduces [employer]media patient portal. Now you can access parts of your medical record, email your doctor's office, and request medication refills online. 1. In your internet browser, go to [email or web address] 2. Click on the First Time User? Click Here link in the Sign In box. You will see the New Member Sign Up page. 3. Enter your [employer] Access Code exactly as it appears below. You will not need to use this code after youve completed the sign-up process. If you do not sign up before the expiration date, you must request a new code. · [employer] Access Code: [email or web address] Expires: 11/5/2018  5:20 AM 
 
4. Enter the last four digits of your Social Security Number (xxxx) and Date of Birth (mm/dd/yyyy) as indicated and click Submit. You will be taken to the next sign-up page. 5. Create a [employer] ID. This will be your [employer] login ID and cannot be changed, so think of one that is secure and easy to remember. 6. Create a [employer] password. You can change your password at any time. 7. Enter your Password Reset Question and Answer. This can be used at a later time if you forget your password. 8. Enter your e-mail address. You will receive e-mail notification when new information is available in [address] 9. Click Sign Up. You can now view and download portions of your medical record. 10. Click the Download Summary menu link to download a portable copy of your medical information. If you have questions, please visit the Frequently Asked Questions section of the [employer] website.  Remember, [employer] is NOT to be used for urgent needs. For medical emergencies, dial 911. Now available from your iPhone and Android! Please provide this summary of care documentation to your next provider. Your primary care clinician is listed as Brielle Gonzales. If you have any questions after today's visit, please call [phone].

## 2018-08-14 NOTE — PROGRESS NOTES
Dhara Concepcion is a 63 y.o. female     Chief Complaint   Patient presents with    Follow-up     4 wk follow up for palpitaitons and anexity. Visit Vitals    /70 (BP 1 Location: Right arm, BP Patient Position: Sitting)    Pulse 86    Temp 98.2 °F (36.8 °C) (Oral)    Resp 16    Ht 5' 6\" (1.676 m)    Wt 105 lb (47.6 kg)    SpO2 98%    BMI 16.95 kg/m2       Health Maintenance Due   Topic Date Due    COLONOSCOPY  08/22/1975    BREAST CANCER SCRN MAMMOGRAM  06/01/2017    ZOSTER VACCINE AGE 60>  06/22/2017       1. Have you been to the ER, urgent care clinic since your last visit? Hospitalized since your last visit? No    2. Have you seen or consulted any other health care providers outside of the [address] [place] [address] since your last visit? Include any pap smears or colon screening.  No

## 2018-08-14 NOTE — PROGRESS NOTES
Subjective:      Nik Sawyer is a 63 y.o. female who presents today for f/u palpitations    Patient very scattered in her thinking    Mood Disorder: depression, anxiety  Has an appointment to see Psych NP Primitivo Askew 8/30 to establish care  Last visit switched from prozac to zoloft. Has noticed a big difference  Is on disability     ADHD:  Has been holding Adderall, doesn't like feeling medicated  Daughter has told her she is acting scattered    Palpitations  Has been recording her caffeine and coffee intake to try to identify a correlation  Did notice a slight increase in palpitations when drinking coffee  holter monitor and stress echocardiogram scheduled for later this week  Has noticed significant improvement in her palpitations since starting the sertraline, has mostly improved       Patient Active Problem List    Diagnosis Date Noted    Recurrent depression ([facility] 01/09/2018    Adult BMI <19 kg/sq m 05/02/2017    Tobacco dependence 10/30/2015    Mood disorder due to a general medical condition 03/31/2015    ADHD (attention deficit hyperactivity disorder) 03/31/2015    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 30 Tab 1    lisinopril (PRINIVIL, ZESTRIL) 10 mg tablet Take 1 Tab by mouth daily. 90 Tab 1    naproxen (NAPROSYN) 500 mg tablet Take 500 mg by mouth two (2) times daily (with meals).  multivitamin (ONE A DAY) tablet Take 1 Tab by mouth daily.  For women over 50      triamterene-hydroCHLOROthiazide (DYAZIDE) 37.5-25 mg per capsule TAKE ONE CAPSULE BY MOUTH DAILY FOR HYPERTENSION  Indications: hypertension 90 Cap 1        Review of Systems    Pertinent items are noted in HPI. Objective:     Visit Vitals    /70 (BP 1 Location: Right arm, BP Patient Position: Sitting)    Pulse 86    Temp 98.2 °F (36.8 °C) (Oral)    Resp 16    Ht 5' 6\" (1.676 m)    Wt 105 lb (47.6 kg)    SpO2 98%    BMI 16.95 kg/m2     General appearance: alert, cooperative, appears stated age  Head: Normocephalic, without obvious abnormality, atraumatic  Lungs: clear to auscultation bilaterally  Heart: regular rate and rhythm  Extremities: extremities normal, atraumatic, no cyanosis or edema  Skin: Skin color, texture, turgor normal  Neurologic: Grossly normal  Psych: scattered, gets off track easily, nonsuicidal    Assessment/Plan:     1. Mood disorder ([facility]  - keep appointment with NP Todd  - cont sertraline 50mg daily  - consider restarting Adderall, as is quite scattered the past few times I have seen her, pt is hesitant, will discuss with Psych NP Basilia Spotted when she estabslishing care    2. Palpitations  - cont limiting caffeine  - improved with sertraline    3. Attention deficit hyperactivity disorder (ADHD), unspecified ADHD type  -  reviewed 8/14/2018   - as above, encouraged patient to restart Adderall      Advised her to call back or return to office if symptoms worsen/change/persist.  Discussed expected course/resolution/complications of diagnosis in detail with patient. Medication risks/benefits/costs/interactions/alternatives discussed with patient. She was given an after visit summary which includes diagnoses, current medications, & vitals. She expressed understanding with the diagnosis and plan.     MAURO Tan

## 2018-08-14 NOTE — PATIENT INSTRUCTIONS
Keep your Stress Echocardiogram appointment and your Holter Monitor    Keep appointment with Psych NP Ronan Bella       Attention Deficit Hyperactivity Disorder (ADHD) in Adults: Care Instructions  Your Care Instructions    Attention deficit hyperactivity disorder, or ADHD, is a condition that makes it hard to pay attention. So you may have problems when you try to focus, get organized, and finish tasks. It might make you more active than other people. Or you might do things without thinking first.  ADHD is very common. It usually starts in early childhood. Many adults don't realize they have it until their children are diagnosed. Then they become aware of their own symptoms. Doctors don't know what causes ADHD. But it often runs in families. ADHD can be treated with medicines, behavior training, and counseling. Treatment can improve your life. Follow-up care is a key part of your treatment and safety. Be sure to make and go to all appointments, and call your doctor if you are having problems. It's also a good idea to know your test results and keep a list of the medicines you take. How can you care for yourself at home? · Learn all you can about ADHD. This will help you and your family understand it better. · Take your medicines exactly as prescribed. Call your doctor if you think you are having a problem with your medicine. You will get more details on the specific medicines your doctor prescribes. · If you miss a dose of your medicine, do not take an extra dose. · If your doctor suggests counseling, find a counselor you like and trust. Talk openly and honestly. Be willing to make some changes. · Find a support group for adults with ADHD. Talking to others with the same problems can help you feel better. It can also give you ideas about how to best cope with the condition. · Get rid of distractions at your work space. Keep your desk clean. Try not to face a window or busy hallway.   · Use files, planners, and other tools to keep you organized. · Limit use of alcohol, and do not use illegal drugs. People with ADHD tend to become addicted more easily than others. Tell your doctor if you need help to quit. Counseling, support groups, and sometimes medicines can help you stay free of alcohol or drugs. · Get at least 30 minutes of physical activity on most days of the week. Exercise has been shown to help people cope with ADHD. Walking is a good choice. You also may want to do other activities, such as running, swimming, cycling, or playing tennis or team sports. When should you call for help? Watch closely for changes in your health, and be sure to contact your doctor if:    · You feel sad a lot or cry all the time.     · You have trouble sleeping, or you sleep too much.     · You find it hard to concentrate, make decisions, or remember things.     · You change how you normally eat.     · You feel guilty for no reason. Where can you learn more? Go to http://farooq-heather.info/. Enter B196 in the search box to learn more about \"Attention Deficit Hyperactivity Disorder (ADHD) in Adults: Care Instructions. \"  Current as of: December 7, 2017  Content Version: 11.7  © 4920-7483 [employer]. Care instructions adapted under license by [employer] (which disclaims liability or warranty for this information). If you have questions about a medical condition or this instruction, always ask your healthcare professional. [place] [address] any warranty or liability for your use of this information.

## 2018-08-27 ENCOUNTER — HOSPITAL ENCOUNTER (OUTPATIENT)
Dept: NON INVASIVE DIAGNOSTICS | Age: 63
Discharge: HOME | End: 2018-08-27
Attending: NURSE PRACTITIONER
Payer: MEDICARE

## 2018-08-27 DIAGNOSIS — R00.2 PALPITATIONS: ICD-10-CM

## 2018-08-27 DIAGNOSIS — R94.31 ABNORMAL EKG: ICD-10-CM

## 2018-08-27 LAB
ATTENDING PHYSICIAN, CST07: NORMAL
DIAGNOSIS, 93000: NORMAL
DUKE TM SCORE RESULT, CST14: NORMAL
DUKE TREADMILL SCORE, CST13: NORMAL
ECG INTERP BEFORE EX, CST11: NORMAL
ECG INTERP DURING EX, CST12: NORMAL
FUNCTIONAL CAPACITY, CST17: NORMAL
KNOWN CARDIAC CONDITION, CST08: NORMAL
MAX. DIASTOLIC BP, CST04: 90 MMHG
MAX. HEART RATE, CST05: 171 BPM
MAX. SYSTOLIC BP, CST03: 200 MMHG
OVERALL BP RESPONSE TO EXERCISE, CST16: NORMAL
OVERALL HR RESPONSE TO EXERCISE, CST15: NORMAL
PEAK EX METS, CST10: 7 METS
PROTOCOL NAME, CST01: NORMAL
REASON FOR TERMINATION: 107 BPM
TEST INDICATION, CST09: NORMAL
TIME IN EXERCISE PHASE, CST02: NORMAL

## 2018-08-27 PROCEDURE — 93225 XTRNL ECG REC<48 HRS REC: CPT

## 2018-08-27 PROCEDURE — 93351 STRESS TTE COMPLETE: CPT

## 2018-08-28 ENCOUNTER — TELEPHONE (OUTPATIENT)
Dept: INTERNAL MEDICINE CLINIC | Age: 63
End: 2018-08-28

## 2018-08-28 NOTE — TELEPHONE ENCOUNTER
Received fax from pharmacy that Patient is taking Zoloft 50 mg po daily. They recommend to change dose to  taking twice daily. Please advise.

## 2018-08-29 DIAGNOSIS — F41.1 GENERALIZED ANXIETY DISORDER: ICD-10-CM

## 2018-08-29 RX ORDER — SERTRALINE HYDROCHLORIDE 50 MG/1
50 TABLET, FILM COATED ORAL DAILY
Qty: 30 TAB | Refills: 1 | Status: SHIPPED | OUTPATIENT
Start: 2018-08-29 | End: 2018-10-23 | Stop reason: SDUPTHER

## 2018-08-29 NOTE — TELEPHONE ENCOUNTER
Last refill- 7/16/2018    Last office visit- 8/14/2018  Next office visit/ Future Appointments  Date Time Provider Brian Mckenzie   8/30/2018 9:30 AM CHON Cui GEO [facility]   10/15/2018 9:00 AM Jovanny Hartman MD [address] [place] [address]         Requested Prescriptions     Pending Prescriptions Disp Refills    sertraline (ZOLOFT) 50 mg tablet 30 Tab 1     Sig: Take 1 Tab by mouth daily.

## 2018-08-30 ENCOUNTER — OFFICE VISIT (OUTPATIENT)
Dept: BEHAVIORAL/MENTAL HEALTH CLINIC | Age: 63
End: 2018-08-30

## 2018-08-30 VITALS — BODY MASS INDEX: 17.59 KG/M2 | WEIGHT: 109 LBS

## 2018-08-30 DIAGNOSIS — F06.30 MOOD DISORDER DUE TO A GENERAL MEDICAL CONDITION: Primary | ICD-10-CM

## 2018-08-30 DIAGNOSIS — F90.9 ATTENTION DEFICIT HYPERACTIVITY DISORDER (ADHD), UNSPECIFIED ADHD TYPE: ICD-10-CM

## 2018-08-30 DIAGNOSIS — F15.20 CAFFEINE DEPENDENCE (HCC): ICD-10-CM

## 2018-08-30 RX ORDER — DEXTROAMPHETAMINE SACCHARATE, AMPHETAMINE ASPARTATE, DEXTROAMPHETAMINE SULFATE AND AMPHETAMINE SULFATE 5; 5; 5; 5 MG/1; MG/1; MG/1; MG/1
20 TABLET ORAL DAILY
Qty: 30 TAB | Refills: 0 | Status: SHIPPED | OUTPATIENT
Start: 2018-08-30 | End: 2018-08-30 | Stop reason: SDUPTHER

## 2018-08-30 RX ORDER — DEXTROAMPHETAMINE SACCHARATE, AMPHETAMINE ASPARTATE, DEXTROAMPHETAMINE SULFATE AND AMPHETAMINE SULFATE 5; 5; 5; 5 MG/1; MG/1; MG/1; MG/1
20 TABLET ORAL DAILY
Qty: 30 TAB | Refills: 0 | Status: SHIPPED | OUTPATIENT
Start: 2018-09-30 | End: 2018-10-23 | Stop reason: SDUPTHER

## 2018-08-30 NOTE — PROGRESS NOTES
Psychiatric Progress Note  Account Number:  [id number]  Name: Aleks Caba    SUBJECTIVE:   Aleks Caba  is a 66 y.o.  female  patient presents for a psychopharmacological management appointment. She is a transfer from Dr. Melba Miranda. PMH includes benign essential hypertension, arteriovenous malformation (AVM) and was in a coma, balance issues, arthritis, and memory loss. Manjula Sood reports that she began having irritability during her 45s when she had AVM and was in a coma. She denies a family h/o psychiatric d/o. Patient denies SI/HI/SIB. No evidence of AH/VH or delusions. Appetite: good   Sleep: good     Response to Treatment: Positive   Side Effects: none    Family History: Raised in [facility], she is close with her mother (father passed years ago), her children, and her 3 grandchildren, she is [place], lives alone, loves dogs and gardening and caring for her grandchildren, receives disbility. Substance Abuse: smokes , h/o caffeine addiction      OBJECTIVE:                 Mental Status exam: WNL except for      Sensorium  oriented to time, place and person   Relations cooperative    Eye Contact    appropriate   Appearance:  age appropriate, casually dressed and groomed, thin    Motor Behavior:  hyperactive and within normal limits   Speech:  hyperverbal   Thought Process: loose associations and tangential   Thought Content free of delusions and free of hallucinations   Suicidal ideations none   Homicidal ideations none   Mood:  Stable/stressed   Affect:  Mood congruent   Memory recent  adequate   Memory remote:  adequate   Concentration:  adequate   Abstraction:  abstract   Insight:  fair   Reliability fair   Judgment:  fair       No Known Allergies     Current Outpatient Prescriptions   Medication Sig Dispense Refill    dextroamphetamine-amphetamine (ADDERALL) 20 mg tablet Take 1 Tab (20 mg total) by mouth daily.   Max Daily Amount: 20 mg 30 Tab 0    sertraline (ZOLOFT) 50 mg tablet Take 1 Tab by mouth daily. 30 Tab 1    lisinopril (PRINIVIL, ZESTRIL) 10 mg tablet Take 1 Tab by mouth daily. 90 Tab 1    naproxen (NAPROSYN) 500 mg tablet Take 500 mg by mouth two (2) times daily (with meals).  multivitamin (ONE A DAY) tablet Take 1 Tab by mouth daily. For women over 50      triamterene-hydroCHLOROthiazide (DYAZIDE) 37.5-25 mg per capsule TAKE ONE CAPSULE BY MOUTH DAILY FOR HYPERTENSION  Indications: hypertension 90 Cap 1        Medication Changes/Adjustments: There are no discontinued medications. ASSESSMENT:  Alyse Mosley  is a 66 y.o.  female patient presenting for her first her f/u appointment with this provider. She is a transfer from Dr. Aurelia Deutsch who has been treating her for ADHD and mood d/o. Takes Adderall, no narcotics or benzos, and also takes Zoloft. She takes Adderall every other day- says that she had a h/o irregular heart beats. She has had several losses: her father, her , and recently her LYN. She stands during session, is rambling and hyperverbal and pressured today. She mainly talks about her grandchildren. She has cut her caffeine \"down\" to 4 cups of coffee daily (reports that she used to drink over 20 cups daily). She is unsure if her meds are helping. Ran out of Zoloft 2 days ago and moods are more irritable. Zoloft helps with irritability. No MA in office today- was only able to get weight. VS from 8/14 reviewed. Has been off and on Adderall- did not like ER formulary to which Dr. Aurelia Deutsch has changed her. Has some issues with swallowing. She wants to go back to taking Adderall IR 20mg every day. She reports stable sleep and appetite. Diagnoses:   Axis I: Mood disorder   ADHD   Caffeine Dependence   Axis II: Deferred   Axis III: HTN, AVM, and Arthritis   Axis IV:Moderate  Axis V: 60-69    RECOMMENDATIONS/PLAN:   1. Medications: Medications reviewed, continue with the current meds.      Orders Placed This Encounter    dextroamphetamine-amphetamine (ADDERALL) 20 mg tablet Continue Zoloft 50mg every day- PCP sent in refill yesterday. Adderall rx handed to patient. 2.  Follow-up Disposition:  Return in about 6 weeks (around 10/11/2018).

## 2018-09-04 DIAGNOSIS — I10 BENIGN ESSENTIAL HYPERTENSION: ICD-10-CM

## 2018-09-04 NOTE — TELEPHONE ENCOUNTER
PCP: Delio Lu NP    Last appt: 8/14/2018  Future Appointments  Date Time Provider Brian Mckenzie   10/12/2018 9:30 AM Philippe Alfonso NP [facility]   10/15/2018 9:00 AM Gerri Morillo MD [address] [facility] [place]       Requested Prescriptions     Pending Prescriptions Disp Refills    triamterene-hydroCHLOROthiazide (DYAZIDE) 37.5-25 mg per capsule 90 Cap 1     Sig: TAKE ONE CAPSULE BY MOUTH DAILY FOR HYPERTENSION  Indications: hypertension

## 2018-09-05 RX ORDER — TRIAMTERENE AND HYDROCHLOROTHIAZIDE 37.5; 25 MG/1; MG/1
CAPSULE ORAL
Qty: 90 CAP | Refills: 1 | Status: SHIPPED | OUTPATIENT
Start: 2018-09-05 | End: 2019-06-28 | Stop reason: SDUPTHER

## 2018-09-17 ENCOUNTER — CLINICAL SUPPORT (OUTPATIENT)
Dept: INTERNAL MEDICINE CLINIC | Age: 63
End: 2018-09-17

## 2018-09-17 VITALS — TEMPERATURE: 98 F

## 2018-09-17 DIAGNOSIS — Z23 ENCOUNTER FOR IMMUNIZATION: Primary | ICD-10-CM

## 2018-09-17 NOTE — PATIENT INSTRUCTIONS
Vaccine Information Statement    Influenza (Flu) Vaccine (Inactivated or Recombinant): What you need to know    Many Vaccine Information Statements are available in [place] and other languages. See www.immunize.org/vis  Hojas de Información Sobre Vacunas están disponibles en Español y en muchos otros idiomas. Visite www.immunize.org/vis    1. Why get vaccinated? Influenza (flu) is a contagious disease that spreads around the [place] every year, usually between October and May. Flu is caused by influenza viruses, and is spread mainly by coughing, sneezing, and close contact. Anyone can get flu. Flu strikes suddenly and can last several days. Symptoms vary by age, but can include:   fever/chills   sore throat   muscle aches   fatigue   cough   headache    runny or stuffy nose    Flu can also lead to pneumonia and blood infections, and cause diarrhea and seizures in children. If you have a medical condition, such as heart or lung disease, flu can make it worse. Flu is more dangerous for some people. Infants and young children, people 72years of age and older, pregnant women, and people with certain health conditions or a weakened immune system are at greatest risk. Each year thousands of people in the [facility] die from flu, and many more are hospitalized. Flu vaccine can:   keep you from getting flu,   make flu less severe if you do get it, and   keep you from spreading flu to your family and other people. 2. Inactivated and recombinant flu vaccines    A dose of flu vaccine is recommended every flu season. Children 6 months through 6years of age may need two doses during the same flu season. Everyone else needs only one dose each flu season.        Some inactivated flu vaccines contain a very small amount of a mercury-based preservative called thimerosal. Studies have not shown thimerosal in vaccines to be harmful, but flu vaccines that do not contain thimerosal are available. There is no live flu virus in flu shots. They cannot cause the flu. There are many flu viruses, and they are always changing. Each year a new flu vaccine is made to protect against three or four viruses that are likely to cause disease in the upcoming flu season. But even when the vaccine doesnt exactly match these viruses, it may still provide some protection    Flu vaccine cannot prevent:   flu that is caused by a virus not covered by the vaccine, or   illnesses that look like flu but are not. It takes about 2 weeks for protection to develop after vaccination, and protection lasts through the flu season. 3. Some people should not get this vaccine    Tell the person who is giving you the vaccine:     If you have any severe, life-threatening allergies. If you ever had a life-threatening allergic reaction after a dose of flu vaccine, or have a severe allergy to any part of this vaccine, you may be advised not to get vaccinated. Most, but not all, types of flu vaccine contain a small amount of egg protein.  If you ever had Guillain-Barré Syndrome (also called GBS). Some people with a history of GBS should not get this vaccine. This should be discussed with your doctor.  If you are not feeling well. It is usually okay to get flu vaccine when you have a mild illness, but you might be asked to come back when you feel better. 4. Risks of a vaccine reaction    With any medicine, including vaccines, there is a chance of reactions. These are usually mild and go away on their own, but serious reactions are also possible. Most people who get a flu shot do not have any problems with it.      Minor problems following a flu shot include:    soreness, redness, or swelling where the shot was given     hoarseness   sore, red or itchy eyes   cough   fever   aches   headache   itching   fatigue  If these problems occur, they usually begin soon after the shot and last 1 or 2 days. More serious problems following a flu shot can include the following:     There may be a small increased risk of Guillain-Barré Syndrome (GBS) after inactivated flu vaccine. This risk has been estimated at 1 or 2 additional cases per million people vaccinated. This is much lower than the risk of severe complications from flu, which can be prevented by flu vaccine.  Young children who get the flu shot along with pneumococcal vaccine (PCV13) and/or DTaP vaccine at the same time might be slightly more likely to have a seizure caused by fever. Ask your doctor for more information. Tell your doctor if a child who is getting flu vaccine has ever had a seizure. Problems that could happen after any injected vaccine:      People sometimes faint after a medical procedure, including vaccination. Sitting or lying down for about 15 minutes can help prevent fainting, and injuries caused by a fall. Tell your doctor if you feel dizzy, or have vision changes or ringing in the ears.  Some people get severe pain in the shoulder and have difficulty moving the arm where a shot was given. This happens very rarely.  Any medication can cause a severe allergic reaction. Such reactions from a vaccine are very rare, estimated at about 1 in a million doses, and would happen within a few minutes to a few hours after the vaccination. As with any medicine, there is a very remote chance of a vaccine causing a serious injury or death. The safety of vaccines is always being monitored. For more information, visit: www.cdc.gov/vaccinesafety/    5. What if there is a serious reaction? What should I look for?  Look for anything that concerns you, such as signs of a severe allergic reaction, very high fever, or unusual behavior.     Signs of a severe allergic reaction can include hives, swelling of the face and throat, difficulty breathing, a fast heartbeat, dizziness, and weakness - usually within a few minutes to a few hours after the vaccination. What should I do?  If you think it is a severe allergic reaction or other emergency that cant wait, call 9-1-1 and get the person to the nearest hospital. Otherwise, call your doctor.  Reactions should be reported to the Vaccine Adverse Event Reporting System (VAERS). Your doctor should file this report, or you can do it yourself through  the VAERS web site at www.vaers. [facility].gov, or by calling 0-578.428.8171. VAERS does not give medical advice. 6. The National Vaccine Injury Compensation Program    The [facility] Vaccine Injury Compensation Program (VICP) is a federal program that was created to compensate people who may have been injured by certain vaccines. Persons who believe they may have been injured by a vaccine can learn about the program and about filing a claim by calling 5-205.903.2706 or visiting the [employer] website at www.[facility].gov/vaccinecompensation. There is a time limit to file a claim for compensation. 7. How can I learn more?  Ask your healthcare provider. He or she can give you the vaccine package insert or suggest other sources of information.  Call your local or state health department.  Contact the Centers for Disease Control and Prevention (CDC):  - Call 6-120.778.4774 (1-800-CDC-INFO) or  - Visit CDCs website at www.cdc.gov/flu    Vaccine Information Statement   Inactivated Influenza Vaccine   8/7/2015  [address] [place] [id number]    Department of Health and Human Services  Centers for Disease Control and Prevention    Office Use Only

## 2018-09-17 NOTE — MR AVS SNAPSHOT
[address] [facility], [address] [place] [address] 
[phone] Patient: Maximino Domínguez MRN: [id number] PTH:[birth year] Visit Information Date & Time Provider Department Dept. Phone Encounter #  
 9/17/2018 10:00 AM [facility]  Follow-up Instructions Return in 3 months (on 12/17/2018) for depression, anxiety & palpitations. Follow-up and Disposition History Your Appointments 10/12/2018  9:30 AM  
New Patient with Jeimy Wood NP [facility] ([address]) Appt Note: 2 month follow up/ 1 hr requested by Tito Foster [address] [facility] [facility] [address] [facility] [address]  
[phone]  
  
   
 [address] [address]  
  
    
 10/15/2018  9:00 AM  
ESTABLISHED PATIENT with Mervat Winston MD  
[place] [id number] [facility]) Appt Note: 4 month f/up; . .... [place] [address]  
[address] [place], [place] [id number] Upcoming Health Maintenance Date Due COLONOSCOPY 8/22/1975 BREAST CANCER SCRN MAMMOGRAM 6/1/2017 ZOSTER VACCINE AGE 60> 6/22/2017 Influenza Age 5 to Adult 8/1/2018 MEDICARE YEARLY EXAM 4/24/2019 DTaP/Tdap/Td series (2 - Td) 10/30/2025 Allergies as of 9/17/2018  Review Complete On: 9/17/2018 By: Rj Rojas LPN No Known Allergies Current Immunizations  Reviewed on 9/17/2018 Name Date Influenza Vaccine 11/5/2014 Influenza Vaccine (Quad) 10/30/2015 10:27 AM  
 Influenza Vaccine (Quad) PF 9/17/2018 10:20 AM  
 Pneumococcal Polysaccharide (PPSV-23) 5/2/2017 Tdap 10/30/2015 10:27 AM  
  
 Reviewed by Rj Rojas LPN on 2/32/8883 at 50:80 AM  
You Were Diagnosed With   
  
 Codes Comments Encounter for immunization    -  Primary ICD-10-CM: V63 ICD-9-CM: V03.89 Vitals Temp OB Status Smoking Status 98 °F (36.7 °C) (Oral) Hysterectomy Current Every Day Smoker Preferred Pharmacy Pharmacy Name Phone [address] [place] [address], [address] [place] [address] Rd. [phone] Your Updated Medication List  
  
   
This list is accurate as of 9/17/18 10:26 AM.  Always use your most recent med list.  
  
  
  
  
 dextroamphetamine-amphetamine 20 mg tablet Commonly known as:  ADDERALL Take 1 Tab (20 mg total) by mouth dailyEarliest Fill Date: 9/30/18. Max Daily Amount: 20 mg  
Start taking on:  9/30/2018  
  
 lisinopril 10 mg tablet Commonly known as:  Terra Reasons Take 1 Tab by mouth daily. multivitamin tablet Commonly known as:  ONE A DAY Take 1 Tab by mouth daily. For women over 50  
  
 naproxen 500 mg tablet Commonly known as:  NAPROSYN Take 500 mg by mouth two (2) times daily (with meals). sertraline 50 mg tablet Commonly known as:  ZOLOFT Take 1 Tab by mouth daily. triamterene-hydroCHLOROthiazide 37.5-25 mg per capsule Commonly known as:  Verlene Lard TAKE ONE CAPSULE BY MOUTH DAILY FOR HYPERTENSION  Indications: hypertension We Performed the Following INFLUENZA VIRUS VAC QUAD,SPLIT,PRESV FREE SYRINGE IM [id number] CPT(R)] MO IMMUNIZ ADMIN,1 SINGLE/COMB VAC/TOXOID [id number] CPT(R)] Follow-up Instructions Return in 3 months (on 12/17/2018) for depression, anxiety & palpitations. Patient Instructions Vaccine Information Statement Influenza (Flu) Vaccine (Inactivated or Recombinant): What you need to know Many Vaccine Information Statements are available in Indonesian and other languages. See www.immunize.org/vis Hojas de Información Sobre Vacunas están disponibles en Español y en muchos otros idiomas. Visite www.immunize.org/vis 1. Why get vaccinated?  
 
Influenza (flu) is a contagious disease that spreads around the [facility] every year, usually between October and May. Flu is caused by influenza viruses, and is spread mainly by coughing, sneezing, and close contact. Anyone can get flu. Flu strikes suddenly and can last several days. Symptoms vary by age, but can include: 
 fever/chills  sore throat  muscle aches  fatigue  cough  headache  runny or stuffy nose Flu can also lead to pneumonia and blood infections, and cause diarrhea and seizures in children. If you have a medical condition, such as heart or lung disease, flu can make it worse. Flu is more dangerous for some people. Infants and young children, people 72years of age and older, pregnant women, and people with certain health conditions or a weakened immune system are at greatest risk. Each year thousands of people in the [facility] die from flu, and many more are hospitalized. Flu vaccine can: 
 keep you from getting flu, 
 make flu less severe if you do get it, and 
 keep you from spreading flu to your family and other people. 2. Inactivated and recombinant flu vaccines A dose of flu vaccine is recommended every flu season. Children 6 months through 6years of age may need two doses during the same flu season. Everyone else needs only one dose each flu season. Some inactivated flu vaccines contain a very small amount of a mercury-based preservative called thimerosal. Studies have not shown thimerosal in vaccines to be harmful, but flu vaccines that do not contain thimerosal are available. There is no live flu virus in flu shots. They cannot cause the flu. There are many flu viruses, and they are always changing. Each year a new flu vaccine is made to protect against three or four viruses that are likely to cause disease in the upcoming flu season. But even when the vaccine doesnt exactly match these viruses, it may still provide some protection Flu vaccine cannot prevent:  flu that is caused by a virus not covered by the vaccine, or 
 illnesses that look like flu but are not. It takes about 2 weeks for protection to develop after vaccination, and protection lasts through the flu season. 3. Some people should not get this vaccine Tell the person who is giving you the vaccine:  If you have any severe, life-threatening allergies. If you ever had a life-threatening allergic reaction after a dose of flu vaccine, or have a severe allergy to any part of this vaccine, you may be advised not to get vaccinated. Most, but not all, types of flu vaccine contain a small amount of egg protein.  If you ever had Guillain-Barré Syndrome (also called GBS). Some people with a history of GBS should not get this vaccine. This should be discussed with your doctor.  If you are not feeling well. It is usually okay to get flu vaccine when you have a mild illness, but you might be asked to come back when you feel better. 4. Risks of a vaccine reaction With any medicine, including vaccines, there is a chance of reactions. These are usually mild and go away on their own, but serious reactions are also possible. Most people who get a flu shot do not have any problems with it. Minor problems following a flu shot include:  
 soreness, redness, or swelling where the shot was given  hoarseness  sore, red or itchy eyes  cough  fever  aches  headache  itching  fatigue If these problems occur, they usually begin soon after the shot and last 1 or 2 days. More serious problems following a flu shot can include the following:  There may be a small increased risk of Guillain-Barré Syndrome (GBS) after inactivated flu vaccine. This risk has been estimated at 1 or 2 additional cases per million people vaccinated. This is much lower than the risk of severe complications from flu, which can be prevented by flu vaccine.  Young children who get the flu shot along with pneumococcal vaccine (PCV13) and/or DTaP vaccine at the same time might be slightly more likely to have a seizure caused by fever. Ask your doctor for more information. Tell your doctor if a child who is getting flu vaccine has ever had a seizure. Problems that could happen after any injected vaccine:  People sometimes faint after a medical procedure, including vaccination. Sitting or lying down for about 15 minutes can help prevent fainting, and injuries caused by a fall. Tell your doctor if you feel dizzy, or have vision changes or ringing in the ears.  Some people get severe pain in the shoulder and have difficulty moving the arm where a shot was given. This happens very rarely.  Any medication can cause a severe allergic reaction. Such reactions from a vaccine are very rare, estimated at about 1 in a million doses, and would happen within a few minutes to a few hours after the vaccination. As with any medicine, there is a very remote chance of a vaccine causing a serious injury or death. The safety of vaccines is always being monitored. For more information, visit: www.cdc.gov/vaccinesafety/ 
 
5. What if there is a serious reaction? What should I look for?  Look for anything that concerns you, such as signs of a severe allergic reaction, very high fever, or unusual behavior. Signs of a severe allergic reaction can include hives, swelling of the face and throat, difficulty breathing, a fast heartbeat, dizziness, and weakness  usually within a few minutes to a few hours after the vaccination. What should I do?  If you think it is a severe allergic reaction or other emergency that cant wait, call 9-1-1 and get the person to the nearest hospital. Otherwise, call your doctor.  Reactions should be reported to the Vaccine Adverse Event Reporting System (VAERS).  Your doctor should file this report, or you can do it yourself through  the VAERS web site at www.vaers. [facility].gov, or by calling 1-251.593.5779. VAERS does not give medical advice. 6. The National Vaccine Injury Compensation Program 
 
The [facility] Vaccine Injury Compensation Program (VICP) is a federal program that was created to compensate people who may have been injured by certain vaccines. Persons who believe they may have been injured by a vaccine can learn about the program and about filing a claim by calling 5-752.869.7399 or visiting the [facility] website at www.[place].gov/vaccinecompensation. There is a time limit to file a claim for compensation. 7. How can I learn more?  Ask your healthcare provider. He or she can give you the vaccine package insert or suggest other sources of information.  Call your local or state health department.  Contact the Centers for Disease Control and Prevention (CDC): 
- Call 0-406.224.9148 (8-348-KTW-INFO) or 
- Visit CDCs website at www.cdc.gov/flu Vaccine Information Statement Inactivated Influenza Vaccine 8/7/2015 
[address] Andrew French 082FO-89 [facility] [employer] Centers for Disease Control and Prevention Office Use Only Introducing \[facility] [place] [facility]\"" & HEALTH SERVICES! [employer] introduces [employer] patient portal. Now you can access parts of your medical record, email your doctor's office, and request medication refills online. 1. In your internet browser, go to [email or web address] 2. Click on the First Time User? Click Here link in the Sign In box. You will see the New Member Sign Up page. 3. Enter your [employer] Access Code exactly as it appears below. You will not need to use this code after youve completed the sign-up process. If you do not sign up before the expiration date, you must request a new code. · [employer] Access Code: [email or web address] Expires: 11/5/2018  5:20 AM 
 
4.  Enter the last four digits of your Social Security Number (xxxx) and Date of Birth (mm/dd/yyyy) as indicated and click Submit. You will be taken to the next sign-up page. 5. Create a Cognitive Match ID. This will be your [employer] Match login ID and cannot be changed, so think of one that is secure and easy to remember. 6. Create a [employer] Match password. You can change your password at any time. 7. Enter your Password Reset Question and Answer. This can be used at a later time if you forget your password. 8. Enter your e-mail address. You will receive e-mail notification when new information is available in [address] 9. Click Sign Up. You can now view and download portions of your medical record. 10. Click the Download Summary menu link to download a portable copy of your medical information. If you have questions, please visit the Frequently Asked Questions section of the [employer] website. Remember, [employer] Match is NOT to be used for urgent needs. For medical emergencies, dial 911. Now available from your iPhone and Android! Please provide this summary of care documentation to your next provider. Your primary care clinician is listed as Ama Garvin. If you have any questions after today's visit, please call [phone].

## 2018-09-17 NOTE — PROGRESS NOTES
Davon Schaffer is a 66 y.o. female who presents for Flu Quad immunization per verbal order from Scott Campbell NP. She denies any symptoms , reactions or allergies that would exclude them from being immunized today. Risks and adverse reactions were discussed and the VIS was given to her. All questions were addressed. She was observed for 10 min post injection. There were no reactions observed.

## 2018-09-25 ENCOUNTER — TELEPHONE (OUTPATIENT)
Dept: RHEUMATOLOGY | Age: 63
End: 2018-09-25

## 2018-09-25 NOTE — TELEPHONE ENCOUNTER
----- Message from Stephanie Zarco sent at 9/25/2018 11:43 AM EDT -----  Regarding: Dr. Woods Arm: [phone]  Pt is requesting a call back in regards to her hands and ankles which are getting worse, she is requesting to possibly getting xrays prior to her next appt.  On Oct. 15.

## 2018-09-26 DIAGNOSIS — M06.9 RHEUMATOID ARTHRITIS, INVOLVING UNSPECIFIED SITE, UNSPECIFIED RHEUMATOID FACTOR PRESENCE: Primary | ICD-10-CM

## 2018-09-26 DIAGNOSIS — M19.90 INFLAMMATORY ARTHRITIS: Primary | ICD-10-CM

## 2018-09-26 NOTE — TELEPHONE ENCOUNTER
Spoke to pt informed pt per Dr Nahun Sotelo the order for xrays of hands ankles and feet are in the system, pt stated that she will be in as soon as possible to get the xrays done, pt verbally acknowledged understanding

## 2018-10-15 ENCOUNTER — HOSPITAL ENCOUNTER (OUTPATIENT)
Dept: LAB | Age: 63
Discharge: HOME | End: 2018-10-15
Payer: MEDICARE

## 2018-10-15 ENCOUNTER — OFFICE VISIT (OUTPATIENT)
Dept: RHEUMATOLOGY | Age: 63
End: 2018-10-15

## 2018-10-15 VITALS
BODY MASS INDEX: 17.04 KG/M2 | OXYGEN SATURATION: 98 % | DIASTOLIC BLOOD PRESSURE: 80 MMHG | RESPIRATION RATE: 16 BRPM | TEMPERATURE: 98.5 F | SYSTOLIC BLOOD PRESSURE: 144 MMHG | WEIGHT: 105.6 LBS | HEART RATE: 70 BPM

## 2018-10-15 DIAGNOSIS — M70.61 TROCHANTERIC BURSITIS OF RIGHT HIP: Primary | ICD-10-CM

## 2018-10-15 DIAGNOSIS — M19.042 PRIMARY OSTEOARTHRITIS OF BOTH HANDS: ICD-10-CM

## 2018-10-15 DIAGNOSIS — M19.041 PRIMARY OSTEOARTHRITIS OF BOTH HANDS: ICD-10-CM

## 2018-10-15 DIAGNOSIS — M06.09 SERONEGATIVE RHEUMATOID ARTHRITIS OF MULTIPLE SITES (HCC): ICD-10-CM

## 2018-10-15 DIAGNOSIS — M19.90 INFLAMMATORY ARTHRITIS: ICD-10-CM

## 2018-10-15 PROCEDURE — 86140 C-REACTIVE PROTEIN: CPT

## 2018-10-15 PROCEDURE — 85651 RBC SED RATE NONAUTOMATED: CPT

## 2018-10-15 PROCEDURE — 80053 COMPREHEN METABOLIC PANEL: CPT

## 2018-10-15 PROCEDURE — 85007 BL SMEAR W/DIFF WBC COUNT: CPT

## 2018-10-15 RX ORDER — PREDNISONE 5 MG/1
5 TABLET ORAL 2 TIMES DAILY
Qty: 60 TAB | Refills: 0 | Status: SHIPPED | OUTPATIENT
Start: 2018-10-15 | End: 2019-01-15 | Stop reason: ALTCHOICE

## 2018-10-15 NOTE — MR AVS SNAPSHOT
[address] [place] [id number] [phone] Patient: Martha Wilkins MRN: [id number] Visit Information Date & Time Provider Department Dept. Phone Encounter #  
 10/15/2018  9:00 AM Claude Childs MD [facility] [phone] [id number] Follow-up Instructions Return in about 2 weeks (around 10/29/2018). Your Appointments 10/23/2018  3:00 PM  
ESTABLISHED PATIENT with Jean-Paul Pinzon NP [facility] ([address] [facility]) Appt Note: 2 month follow up/ 1 hr requested by Martinez Morocho; 10.11. 18-left vm to confirm; 10.12.18-spoke to pt to cx; 2 month follow up/ 1 hr requested by Martinez Morocho [facility] [address] [place] [address]  
[phone]  
  
   
 [address] [place] [address] [address] [place] [id number]  
  
    
 2018 10:00 AM  
ROUTINE CARE with [facility] ([address] [facility]) Appt Note: 3m f/u depression, anxiety & palpitations. [address], [address] [facility] [address]  
[address], [facility] [address] [facility] [address] [address] Upcoming Health Maintenance Date Due COLONOSCOPY 1975 Shingrix Vaccine Age 50> (1 of 2) 2007 BREAST CANCER SCRN MAMMOGRAM 2017 MEDICARE YEARLY EXAM 2019 DTaP/Tdap/Td series (2 - Td) 10/30/2025 Allergies as of 10/15/2018  Review Complete On: 10/15/2018 By: Heber Degroot LPN No Known Allergies Current Immunizations  Reviewed on 2018 Name Date Influenza Vaccine 2014 Influenza Vaccine (Quad) 10/30/2015 10:27 AM  
 Influenza Vaccine (Quad) PF 2018 10:20 AM  
 Pneumococcal Polysaccharide (PPSV-23) 2017 Tdap 10/30/2015 10:27 AM  
  
 Not reviewed this visit You Were Diagnosed With   
  
 Codes Comments Trochanteric bursitis of right hip    -  Primary ICD-10-CM: M70.61 ICD-9-CM: 726.5 Inflammatory arthritis     ICD-10-CM: M19.90 ICD-9-CM: 714.9 Primary osteoarthritis of both hands     ICD-10-CM: M19.041, C04.852 ICD-9-CM: 715.14 Seronegative rheumatoid arthritis of multiple sites [facility])     ICD-10-CM: M06.09 
ICD-9-CM: 714.0 Vitals BP Pulse Temp Resp Weight(growth percentile) SpO2  
 144/80 (BP 1 Location: Left arm, BP Patient Position: Sitting) 70 98.5 °F (36.9 °C) (Oral) 16 105 lb 9.6 oz (47.9 kg) 98% BMI OB Status Smoking Status 17.04 kg/m2 Hysterectomy Current Every Day Smoker BMI and BSA Data Body Mass Index Body Surface Area 17.04 kg/m 2 1.49 m 2 Preferred Pharmacy Pharmacy Name Phone [address] [phone] Your Updated Medication List  
  
   
This list is accurate as of 10/15/18  9:38 AM.  Always use your most recent med list.  
  
  
  
  
 dextroamphetamine-amphetamine 20 mg tablet Commonly known as:  ADDERALL Take 1 Tab (20 mg total) by mouth dailyEarliest Fill Date: 9/30/18. Max Daily Amount: 20 mg  
  
 lisinopril 10 mg tablet Commonly known as:  Skylar Arteaga Take 1 Tab by mouth daily. multivitamin tablet Commonly known as:  ONE A DAY Take 1 Tab by mouth daily. For women over 50  
  
 naproxen 500 mg tablet Commonly known as:  NAPROSYN Take 500 mg by mouth two (2) times daily (with meals). predniSONE 5 mg tablet Commonly known as:  Leward Meals Take 1 Tab by mouth two (2) times a day. sertraline 50 mg tablet Commonly known as:  ZOLOFT Take 1 Tab by mouth daily. triamterene-hydroCHLOROthiazide 37.5-25 mg per capsule Commonly known as:  Kavya Nelda TAKE ONE CAPSULE BY MOUTH DAILY FOR HYPERTENSION  Indications: hypertension Prescriptions Sent to Pharmacy Refills  
 predniSONE (DELTASONE) 5 mg tablet 0 Sig: Take 1 Tab by mouth two (2) times a day.   
 Class: Normal  
 Pharmacy: [facility] [address] [facility] #: [phone] Route: Oral  
  
We Performed the Following C REACTIVE PROTEIN, QT [80558 CPT(R)] CBC+PLATELET+HEM REVIEW [65054 CPT(R)] METABOLIC PANEL, COMPREHENSIVE [67153 CPT(R)] SED RATE (ESR) [id number] CPT(R)] Follow-up Instructions Return in about 2 weeks (around 10/29/2018). Introducing [facility] & HEALTH SERVICES! [facility] introduces [employer] patient portal. Now you can access parts of your medical record, email your doctor's office, and request medication refills online. 1. In your internet browser, go to [email or web address] 2. Click on the First Time User? Click Here link in the Sign In box. You will see the New Member Sign Up page. 3. Enter your yuback Access Code exactly as it appears below. You will not need to use this code after youve completed the sign-up process. If you do not sign up before the expiration date, you must request a new code. · [employer]uback Access Code: [email or web address] Expires: 11/5/2018  5:20 AM 
 
4. Enter the last four digits of your Social Security Number (xxxx) and Date of Birth (mm/dd/yyyy) as indicated and click Submit. You will be taken to the next sign-up page. 5. Create a yuback ID. This will be your yuback login ID and cannot be changed, so think of one that is secure and easy to remember. 6. Create a yuback password. You can change your password at any time. 7. Enter your Password Reset Question and Answer. This can be used at a later time if you forget your password. 8. Enter your e-mail address. You will receive e-mail notification when new information is available in [address] 9. Click Sign Up. You can now view and download portions of your medical record. 10. Click the Download Summary menu link to download a portable copy of your medical information.  
 
If you have questions, please visit the Frequently Asked Questions section of the [employer]. Remember, [employer] is NOT to be used for urgent needs. For medical emergencies, dial 911. Now available from your iPhone and Android! Please provide this summary of care documentation to your next provider. Your primary care clinician is listed as Lorena Benavides. If you have any questions after today's visit, please call [phone].

## 2018-10-15 NOTE — PROGRESS NOTES
RHEUMATOLOGY PROBLEM LIST AND CHIEF COMPLAINT  1. Inflammatory arthritis - Joint space narrowing of 2nd and 3rd MCPs, morning stiffness, improvement of joint pain throughout the day, one episode of arthritis in right wrist that responded to prednisone, JOSELITO positive, CCP positive, CRP elevated   2. Osteoarthritis     INTERVAL HISTORY  This is a 66 y.o.  female. Today, the patient complains of no pain in the joints. Location: none  Severity:  0 on a scale of 0-10  Timing: all day   Duration:  1 years  Modifying Factors: Abnormal gait secondary to hip bursitis  Context/Associated signs and symptoms: Patient complains of right hand stiffness, cramping, and dROM. Her morning stiffness lasts for hours and improves with activity. She mentions these symptoms have notably worsened since June. She also mentions an episode of dehydration in August, complaining of excessive sweatiness, dizziness, and fatigue. Her right bursa continues to bother her today, with stiffness and pain that worsens with activity. We reviewed x-rays of her ankles, feet, and hands taken in September. RHEUMATOLOGY REVIEW OF SYSTEMS   Positives as per history  Negatives as follows:  Javi Crass:  Denies unexplained persistent fevers or weight change  RESPIRATORY:  No pleuritic pain, exertional dyspnea  CARDIOVASCULAR:  Denies chest pain  GASTRO:   Denies heartburn, abdominal pain, nausea, vomiting, diarrhea  SKIN:    Denies rash   MSK:    No persistent joint swelling    PAST MEDICAL HISTORY  Reviewed with patient, significant changes in medical history - no    PHYSICAL EXAM  Blood pressure 144/80, pulse 70, temperature 98.5 °F (36.9 °C), temperature source Oral, resp. rate 16, weight 105 lb 9.6 oz (47.9 kg), SpO2 98 %. GENERAL APPEARANCE: Well-nourished, no acute distress  NECK: No adenopathy  ENT: No oral ulcers  CARDIOVASCULAR: Heart rhythm is regular. No murmur, rub, gallop  CHEST: Normal vesicular breath sounds.  No wheezes, rales, pleural friction rubs  ABDOMINAL: The abdomen is soft and nontender. Bowel sounds are normal  SKIN: No rash, palpable purpura, digital ulcer, abnormal thickening   MUSCULOSKELETAL:   Upper extremities - Heberden's and Sebastian's nodes. B/l hand synovial thickening over MCP joints (R>L), no synovitis. R CMC, first IP tenderness. Right wrist tenderness & fullness. Left wrist normal.   Lower extremities - R lateral thigh tenderness secondary to trochanteric bursitis    LABS, RADIOLOGY AND PROCEDURES   Previous labs reviewed -Yes    ASSESSMENT  1. Inflammatory arthritis - (Established problem -  Progressive disease) - The patient's right wrist is flaring. I will start her on prednisone 10 mg daily. She should monitor response and return in 2 weeks for a follow up. We discussed other treatments based on her response. 2. Osteoarthritis - ROM exercises are recommended for now. 3. Trochanteric bursitis -  She presents with stiffness that improves with activity. She is walking with an abnormal gait today. Her symptoms may improve with oral steroids. We will most likely inject her right bursa next visit. The pain and discomfort over the lateral thigh is most likely coming from inflammation of the bursa. There is pain on the outside of the hip and thigh, pain with lying on the affected side, pain with pressure on the lateral thigh, pain with activity and pain with walking up stairs. These are all classic for this diagnosis. We reviewed rehabilitation exercises with the patient (piriformas stretch, iliotibial band stretch, straight leg stretch, wall squat with ball and gluteal strengthening). There is also relief from bursa injection with corticosteroids and lidocaine. PLAN  1. Check CBC, CMP, markers of inflammation (ESR, CRP)  2. Prednisone 10 mg daily  3. Return in 2 weeks    Avis Sams MD  Adult and Pediatric Rheumatology     [facility]  [address] [facility], [place], [address] [place] [address], Phone [phone], Fax [phone]   E-mail: [email or web address]    Visiting  of Pediatrics    Department of Pediatrics, [facility] of [address] [place] [address], [address] [place] [address], Phone [phone], Fax [phone]  E-mail: [email or web address]"    There are no Patient Instructions on file for this visit. cc:  Ada Singh NP    Written by william Joel, as dictated by Rema Chiang.  Prashanth Jones M.D.

## 2018-10-17 LAB
ALBUMIN SERPL-MCNC: 4.4 G/DL (ref 3.6–4.8)
ALBUMIN/GLOB SERPL: 1.8 {RATIO} (ref 1.2–2.2)
ALP SERPL-CCNC: 52 IU/L (ref 39–117)
ALT SERPL-CCNC: 15 IU/L (ref 0–32)
AST SERPL-CCNC: 19 IU/L (ref 0–40)
BASOPHILS # BLD MANUAL: 0 X10E3/UL (ref 0–0.2)
BASOPHILS NFR BLD MANUAL: 0 %
BILIRUB SERPL-MCNC: 0.4 MG/DL (ref 0–1.2)
BUN SERPL-MCNC: 14 MG/DL (ref 8–27)
BUN/CREAT SERPL: 18 (ref 12–28)
CALCIUM SERPL-MCNC: 9.4 MG/DL (ref 8.7–10.3)
CHLORIDE SERPL-SCNC: 101 MMOL/L (ref 96–106)
CO2 SERPL-SCNC: 24 MMOL/L (ref 20–29)
CREAT SERPL-MCNC: 0.8 MG/DL (ref 0.57–1)
CRP SERPL-MCNC: <0.3 MG/L (ref 0–4.9)
DIFFERENTIAL COMMENT, 115260: NORMAL
EOSINOPHIL # BLD MANUAL: 0.1 X10E3/UL (ref 0–0.4)
EOSINOPHIL NFR BLD MANUAL: 2 %
ERYTHROCYTE [DISTWIDTH] IN BLOOD BY AUTOMATED COUNT: 12.8 % (ref 12.3–15.4)
ERYTHROCYTE [SEDIMENTATION RATE] IN BLOOD BY WESTERGREN METHOD: 2 MM/HR (ref 0–40)
GLOBULIN SER CALC-MCNC: 2.4 G/DL (ref 1.5–4.5)
GLUCOSE SERPL-MCNC: 97 MG/DL (ref 65–99)
HCT VFR BLD AUTO: 41.6 % (ref 34–46.6)
HGB BLD-MCNC: 14.1 G/DL (ref 11.1–15.9)
LYMPHOCYTES # BLD MANUAL: 1.8 X10E3/UL (ref 0.7–3.1)
LYMPHOCYTES NFR BLD MANUAL: 37 %
MCH RBC QN AUTO: 32.6 PG (ref 26.6–33)
MCHC RBC AUTO-ENTMCNC: 33.9 G/DL (ref 31.5–35.7)
MCV RBC AUTO: 96 FL (ref 79–97)
MONOCYTES # BLD MANUAL: 0.4 X10E3/UL (ref 0.1–0.9)
MONOCYTES NFR BLD MANUAL: 8 %
NEUTROPHILS # BLD MANUAL: 2.6 X10E3/UL (ref 1.4–7)
NEUTROPHILS NFR BLD MANUAL: 53 %
PLATELET # BLD AUTO: 273 X10E3/UL (ref 150–379)
PLATELET BLD QL SMEAR: ADEQUATE
POTASSIUM SERPL-SCNC: 4.5 MMOL/L (ref 3.5–5.2)
PROT SERPL-MCNC: 6.8 G/DL (ref 6–8.5)
RBC # BLD AUTO: 4.33 X10E6/UL (ref 3.77–5.28)
RBC MORPH BLD: NORMAL
SODIUM SERPL-SCNC: 141 MMOL/L (ref 134–144)
WBC # BLD AUTO: 4.9 X10E3/UL (ref 3.4–10.8)

## 2018-10-23 ENCOUNTER — OFFICE VISIT (OUTPATIENT)
Dept: BEHAVIORAL/MENTAL HEALTH CLINIC | Age: 63
End: 2018-10-23

## 2018-10-23 VITALS
BODY MASS INDEX: 17.19 KG/M2 | HEIGHT: 66 IN | DIASTOLIC BLOOD PRESSURE: 74 MMHG | SYSTOLIC BLOOD PRESSURE: 140 MMHG | WEIGHT: 107 LBS | HEART RATE: 103 BPM

## 2018-10-23 DIAGNOSIS — F41.1 GENERALIZED ANXIETY DISORDER: ICD-10-CM

## 2018-10-23 DIAGNOSIS — F90.9 ATTENTION DEFICIT HYPERACTIVITY DISORDER (ADHD), UNSPECIFIED ADHD TYPE: ICD-10-CM

## 2018-10-23 RX ORDER — SERTRALINE HYDROCHLORIDE 50 MG/1
75 TABLET, FILM COATED ORAL DAILY
Qty: 45 TAB | Refills: 2 | Status: SHIPPED | OUTPATIENT
Start: 2018-10-23 | End: 2018-11-21 | Stop reason: ALTCHOICE

## 2018-10-23 RX ORDER — DEXTROAMPHETAMINE SACCHARATE, AMPHETAMINE ASPARTATE, DEXTROAMPHETAMINE SULFATE AND AMPHETAMINE SULFATE 5; 5; 5; 5 MG/1; MG/1; MG/1; MG/1
20 TABLET ORAL DAILY
Qty: 30 TAB | Refills: 0 | Status: SHIPPED | OUTPATIENT
Start: 2018-10-23 | End: 2018-10-23 | Stop reason: SDUPTHER

## 2018-10-23 RX ORDER — DEXTROAMPHETAMINE SACCHARATE, AMPHETAMINE ASPARTATE, DEXTROAMPHETAMINE SULFATE AND AMPHETAMINE SULFATE 5; 5; 5; 5 MG/1; MG/1; MG/1; MG/1
20 TABLET ORAL DAILY
Qty: 30 TAB | Refills: 0 | Status: SHIPPED | OUTPATIENT
Start: 2018-11-23 | End: 2018-11-21 | Stop reason: ALTCHOICE

## 2018-11-12 ENCOUNTER — OFFICE VISIT (OUTPATIENT)
Dept: RHEUMATOLOGY | Age: 63
End: 2018-11-12

## 2018-11-12 VITALS
DIASTOLIC BLOOD PRESSURE: 87 MMHG | RESPIRATION RATE: 16 BRPM | BODY MASS INDEX: 17.43 KG/M2 | OXYGEN SATURATION: 96 % | HEART RATE: 84 BPM | WEIGHT: 108 LBS | TEMPERATURE: 98.1 F | SYSTOLIC BLOOD PRESSURE: 162 MMHG

## 2018-11-12 DIAGNOSIS — M19.041 PRIMARY OSTEOARTHRITIS OF BOTH HANDS: ICD-10-CM

## 2018-11-12 DIAGNOSIS — R53.82 CHRONIC FATIGUE: ICD-10-CM

## 2018-11-12 DIAGNOSIS — M19.90 INFLAMMATORY ARTHRITIS: Primary | ICD-10-CM

## 2018-11-12 DIAGNOSIS — M19.042 PRIMARY OSTEOARTHRITIS OF BOTH HANDS: ICD-10-CM

## 2018-11-12 DIAGNOSIS — M06.09 SERONEGATIVE RHEUMATOID ARTHRITIS OF MULTIPLE SITES (HCC): ICD-10-CM

## 2018-11-12 NOTE — PROGRESS NOTES
RHEUMATOLOGY PROBLEM LIST AND CHIEF COMPLAINT  1. Inflammatory arthritis - Joint space narrowing of 2nd and 3rd MCPs, morning stiffness, improvement of joint pain throughout the day, one episode of arthritis in right wrist that responded to prednisone, JOSELITO positive, CCP positive, CRP elevated   2. Osteoarthritis     INTERVAL HISTORY  This is a 66 y.o.  female. Today, the patient complains of pain in the joints. Location: generalized  Severity:  2 on a scale of 0-10  Timing: all day   Duration:  1 years  Modifying Factors: Abnormal gait secondary to hip bursitis (improved)  Context/Associated signs and symptoms: The patient has improved significantly on prednisone 10 mg daily. She has no complaints of stiffness or tenderness today. She has concerns taking pills for a prolonged period of time. RHEUMATOLOGY REVIEW OF SYSTEMS   Positives as per history  Negatives as follows:  Beverly Addison:  Denies unexplained persistent fevers or weight change  RESPIRATORY:  No pleuritic pain, exertional dyspnea  CARDIOVASCULAR:  Denies chest pain  GASTRO:   Denies heartburn, abdominal pain, nausea, vomiting, diarrhea  SKIN:    Denies rash   MSK:    No persistent joint swelling    PAST MEDICAL HISTORY  Reviewed with patient, significant changes in medical history - no    PHYSICAL EXAM  Blood pressure 162/87, pulse 84, temperature 98.1 °F (36.7 °C), temperature source Oral, resp. rate 16, weight 108 lb (49 kg), SpO2 96 %. GENERAL APPEARANCE: Well-nourished, no acute distress  NECK: No adenopathy  ENT: No oral ulcers  CARDIOVASCULAR: Heart rhythm is regular. No murmur, rub, gallop  CHEST: Normal vesicular breath sounds. No wheezes, rales, pleural friction rubs  ABDOMINAL: The abdomen is soft and nontender. Bowel sounds are normal  SKIN: No rash, palpable purpura, digital ulcer, abnormal thickening   MUSCULOSKELETAL:   Upper extremities - Heberden's and Sebastian's nodes.  B/l hand synovial thickening over MCP joints (R>L), no synovitis. Lower extremities - R lateral thigh tenderness secondary to trochanteric bursitis - unchanged. LABS, RADIOLOGY AND PROCEDURES   Previous labs reviewed -Yes    ASSESSMENT  1. Inflammatory arthritis (Established problem -  Very good partial response) - The patient has improved significantly on exam. She has responded well to prednisone 10 mg daily. I would taper her prednisone and start her on Plaquenil, but she prefers not to take pills for an extended period of time. For now, I will start her on a steroid taper by 2.5 mg q2 weeks. She should return in 2 months for a follow up. 2. Osteoarthritis - ROM exercises are recommended for now. 3. Trochanteric bursitis -  We did not discuss this issue today    PLAN  1. Taper prednisone 10 mg daily as directed  2. Return in 2 months    Avis Batista MD  Adult and Pediatric Rheumatology     [address], Phone [phone], Fax [phone]   E-mail: [email or web address]    Visiting  of Pediatrics    Department of Pediatrics, [facility] [place], [place] of [address] [place] [address], [address] [place] [address], Phone [phone], Fax [phone]  E-mail: [email or web address]    Patient Instructions   Prednisone 7.5mg (1 1/2 tablets) x 2 weeks  Prednisone 5mg (1 tablets) x 2 weeks  Prednisone 2.5mg (1/2 tablets) x 2 weeks          cc:  Honorio Singleton NP    Written by william Orta, as dictated by Reuben Nguyen.  Nava Batista M.D.

## 2018-11-12 NOTE — PATIENT INSTRUCTIONS
Prednisone 7.5mg (1 1/2 tablets) x 2 weeks  Prednisone 5mg (1 tablets) x 2 weeks  Prednisone 2.5mg (1/2 tablets) x 2 weeks

## 2018-11-14 ENCOUNTER — OFFICE VISIT (OUTPATIENT)
Dept: NEUROLOGY | Age: 63
End: 2018-11-14

## 2018-11-14 VITALS — HEART RATE: 126 BPM | DIASTOLIC BLOOD PRESSURE: 80 MMHG | SYSTOLIC BLOOD PRESSURE: 152 MMHG | OXYGEN SATURATION: 98 %

## 2018-11-14 DIAGNOSIS — R26.9 GAIT DISTURBANCE: Primary | ICD-10-CM

## 2018-11-14 NOTE — LETTER
11/14/2018 2:07 PM 
 
Patient:  Dee Webster YOB: 1955 Date of Visit: 11/14/2018 Dear Clementine Mondragon, CHON 
[address] [facility] [address] [facility] 7 [id number] VIA In Basket 
 : Thank you for referring Ms. Tavo Tran to me for evaluation/treatment. Below are the relevant portions of my assessment and plan of care. If you have questions, please do not hesitate to call me. I look forward to following Ms. Sherree Shone along with you. Sincerely, Ainsley Huggins MD

## 2018-11-14 NOTE — PROGRESS NOTES
NEUROLOGY NEW PATIENT OFFICE CONSULTATION      11/14/2018    RE: Tom Freeman         1955      REFERRED BY:  Sudha Aguillon NP        CHIEF COMPLAINT:  This is Tom Freeman is a 66 y.o. female right handed on disability who comes in with multiple concerns. HPI:     Patient has history of Cerebellar AVM s/p resection in 2007. Since then, has a baseline balance issue, but felt that it has gotten worse. (-) lower back pain. Seen a rheumatologist and placed on Prednisone    Patient also noted \"cramping of both hands\" when she is writing. Headache L parietal sharp pain    (+) cramping both hands      MRI brain 2011 showed post operative changes in the middle (vermis) of cerebellum. MRA brain was normal.    ROS   (-) fever  (-) rash  All other systems reviewed and are negative    Past Medical Hx  Past Medical History:   Diagnosis Date    ADD (attention deficit disorder)     Arthritis     AVM (arteriovenous malformation) brain 2009    CVA (cerebral infarction) 2009    Hypertension     Inattention        Social Hx  Social History     Socioeconomic History    Marital status:      Spouse name: Not on file    Number of children: Not on file    Years of education: Not on file    Highest education level: Not on file   Social Needs    Financial resource strain: Not on file    Food insecurity - worry: Not on file    Food insecurity - inability: Not on file   [employer] needs - medical: Not on file   [employer] needs - non-medical: Not on file   Occupational History    Not on file   Tobacco Use    Smoking status: Current Every Day Smoker     Packs/day: 0.50     Years: 16.00     Pack years: 8.00    Smokeless tobacco: Never Used   Substance and Sexual Activity    Alcohol use:  Yes     Alcohol/week: 1.2 oz     Types: 1 Glasses of wine, 1 Cans of beer per week    Drug use: No    Sexual activity: Not Currently   Other Topics Concern    Not on file   Social History Narrative , her ex  has passed away, one son in [place], two daughters- one in [place] and one in [place]. Family Hx  Family History   Problem Relation Age of Onset    Hypertension Mother     Elevated Lipids Mother     Diabetes Father     Heart Attack Maternal Grandmother     Diabetes Paternal Grandmother     Diabetes Paternal Grandfather        ALLERGIES  No Known Allergies    CURRENT MEDS  Current Outpatient Medications   Medication Sig Dispense Refill    sertraline (ZOLOFT) 50 mg tablet Take 1.5 Tabs by mouth daily. 45 Tab 2    [START ON 11/23/2018] dextroamphetamine-amphetamine (ADDERALL) 20 mg tablet Take 1 Tab (20 mg total) by mouth dailyEarliest Fill Date: 11/23/18. Max Daily Amount: 20 mg 30 Tab 0    predniSONE (DELTASONE) 5 mg tablet Take 1 Tab by mouth two (2) times a day. 60 Tab 0    triamterene-hydroCHLOROthiazide (DYAZIDE) 37.5-25 mg per capsule TAKE ONE CAPSULE BY MOUTH DAILY FOR HYPERTENSION  Indications: hypertension 90 Cap 1    lisinopril (PRINIVIL, ZESTRIL) 10 mg tablet Take 1 Tab by mouth daily. 90 Tab 1    naproxen (NAPROSYN) 500 mg tablet Take 500 mg by mouth two (2) times daily (with meals).  multivitamin (ONE A DAY) tablet Take 1 Tab by mouth daily. For women over 50             PREVIOUS WORKUP: (reviewed)  IMAGING:    CT Results (recent):  Results from Hospital Encounter encounter on 10/02/11   CT HEAD WITHOUT CONTRAST    Narrative **Final Report**       ICD Codes / Adm. Diagnosis: 82  195853 / Sore Throat  Generalized Body Aches  Examination:  CT HEAD  CON  - [id number] - Oct  2 2011  1:51PM  Accession No:  [id number]  Reason:  dizziness      REPORT:  INDICATION:  dizziness     EXAM: Unenhanced CT of the head. Comparison 07/13/2007. FINDINGS: Patient is post suboccipital decompression. Postsurgical changes   to the vermis are unchanged. No evidence of hydrocephalus. No mass lesion or   midline shift. No evidence of acute intracranial hemorrhage. IMPRESSION:  1. No change. No acute intracranial abnormality             Signing/Reading Doctor: Melany Damico ([id number])    Approved: Melany Damico ([id number])  10/02/2011                                      MRI Results (recent):  Results from [place] encounter on 12/22/11   MRI BRAIN W AND W/O CONTRAST    Narrative **Final Report**       ICD Codes / Adm. Diagnosis: 781. 2  435241 / ABNORMALITY OF GAIT    Examination:  MR BRAIN W AND WO CON  - [id number] - Dec 22 2011  8:23AM  Accession No:  [id number]  Reason:        REPORT:  INDICATION: dizziness. Headache. Gait disturbance 781.2    TECHNIQUE: Sagittal T1, axial FLAIR, T2, T1 and gradient echo T2-weighted   images of the head were obtained followed by intravenous infusion 10 mL   gadolinium repeat axial and coronal T1-weighted images and axial diffusion   weighted images. COMPARISON: MRI 10/03/2006, CT head 10/02/2011    Postoperative findings are noted related to the suboccipital craniotomy with   the patient's arteriovenous malformation was resected from the cerebellum. Residual hemosiderin staining in the location of the previous hemorrhage. Volume loss and enlargement of the fourth ventricle related to this. No   abnormal enhancement or other findings that would suggest an acute process. T2 hyperintensity along the prior bilateral frontal ventriculostomy tube   tracks. One or 2 other tiny nonspecific foci of T2 hyperintensity in the   cerebral white matter are nonspecific. No evidence of acute intracranial hemorrhage, mass or abnormal extra-axial   fluid collections. No abnormal intracranial enhancement. Otherwise, the ventricular size and configuration are within normal limits. Normal flow-voids are present in the vertebral, basilar and carotid artery   systems. The craniocervical junction is otherwise unremarkable. The structures of the cranial base including paranasal sinuses are    unremarkable. IMPRESSION:   1.  No unusual postoperative findings posterior fossa. 2. No acute intracranial abnormality demonstrated. Signing/Reading Doctor: Anna Meyer ([id number])    Approved: Anna Meyer ([id number])  12/22/2011                                      IR Results (recent):  No results found for this or any previous visit. VAS/US Results (recent):  No results found for this or any previous visit. LABS (reviewed)  Results for orders placed or performed in visit on 10/15/18   CBC+PLATELET+HEM REVIEW   Result Value Ref Range    WBC 4.9 3.4 - 10.8 x10E3/uL    RBC 4.33 3.77 - 5.28 x10E6/uL    HGB 14.1 11.1 - 15.9 g/dL    HCT 41.6 34.0 - 46.6 %    MCV 96 79 - 97 fL    MCH 32.6 26.6 - 33.0 pg    MCHC 33.9 31.5 - 35.7 g/dL    RDW 12.8 12.3 - 15.4 %    PLATELET 309 273 - 443 x10E3/uL    NEUTROPHILS 53 Not Estab. %    LYMPHOCYTES 37 Not Estab. %    MONOCYTES 8 Not Estab. %    EOSINOPHILS 2 Not Estab. %    BASOPHILS 0 Not Estab. %    ABS. NEUTROPHILS 2.6 1.4 - 7.0 x10E3/uL    ABS. LYMPHOCYTES 1.8 0.7 - 3.1 x10E3/uL    ABS. MONOCYTES 0.4 0.1 - 0.9 x10E3/uL    ABS. EOSINOPHILS 0.1 0.0 - 0.4 x10E3/uL    ABS. BASOPHILS 0.0 0.0 - 0.2 x10E3/uL    Differential comment Comment     RBC COMMENT RBC's appear normal. Normal    PLATELET COMMENT Adequate Adequate   METABOLIC PANEL, COMPREHENSIVE   Result Value Ref Range    Glucose 97 65 - 99 mg/dL    BUN 14 8 - 27 mg/dL    Creatinine 0.80 0.57 - 1.00 mg/dL    GFR est non-AA 80 >59 mL/min/1.73    GFR est AA 92 >59 mL/min/1.73    BUN/Creatinine ratio 18 12 - 28    Sodium 141 134 - 144 mmol/L    Potassium 4.5 3.5 - 5.2 mmol/L    Chloride 101 96 - 106 mmol/L    CO2 24 20 - 29 mmol/L    Calcium 9.4 8.7 - 10.3 mg/dL    Protein, total 6.8 6.0 - 8.5 g/dL    Albumin 4.4 3.6 - 4.8 g/dL    GLOBULIN, TOTAL 2.4 1.5 - 4.5 g/dL    A-G Ratio 1.8 1.2 - 2.2    Bilirubin, total 0.4 0.0 - 1.2 mg/dL    Alk.  phosphatase 52 39 - 117 IU/L    AST (SGOT) 19 0 - 40 IU/L    ALT (SGPT) 15 0 - 32 IU/L   C REACTIVE PROTEIN, QT   Result Value Ref Range    C-Reactive Protein, Qt <0.3 0.0 - 4.9 mg/L   SED RATE (ESR)   Result Value Ref Range    Sed rate (ESR) 2 0 - 40 mm/hr       Physical Exam:     Visit Vitals  /80   Pulse (!) 126   SpO2 98%     General:  Alert, cooperative, no distress. rapid pressured speech   Head:  Normocephalic, without obvious abnormality, atraumatic. Eyes:  Conjunctivae/corneas clear. Lungs:  Heart:   Non labored breathing  Regular rate and rhythm, no carotid bruits   Abdomen:   Soft, non-distended   Extremities: Extremities normal, atraumatic, no cyanosis or edema. Pulses: 2+ and symmetric all extremities. Skin: Skin color, texture, turgor normal. No rashes or lesions. Neurologic Exam     Gen: Attention normal             Language: naming, repetition, fluency normal             Memory: intact recent and remote memory  Cranial Nerves:  I: smell Not tested   II: visual fields Full to confrontation   II: pupils Equal, round, reactive to light   II: optic disc No papilledema   III,VII: ptosis none   III,IV,VI: extraocular muscles  Full ROM   V: mastication normal   V: facial light touch sensation  normal   VII: facial muscle function   symmetric   VIII: hearing symmetric   IX: soft palate elevation  normal   XI: trapezius strength  5/5   XI: sternocleidomastoid strength 5/5   XI: neck flexion strength  5/5   XII: tongue  midline     Motor: normal bulk and tone, no tremor              Strength: 5/5 all four extremities  Sensory: intact to LT, PP, vibration, and JPS  Reflexes: 2+ throughout; Down going toes  Coordination: Good FTN and HTS (-) dysdiadochokinesia  Gait: Wide based gait; Unable to do tandem walking           Impression:     Alva Jean is a 66 y.o. 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 who comes in with severl concerns. Patient has history of Cerebellar AVM s/p resection in 2007. Since then, has a baseline balance issue described as wide based gait, but felt that it has gotten worse. Denies use of assisted device and no recent falls. 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 Patient also has cramping\" and tightness of finger joint which again may be related to arthritis or it can be due to writers cramps. RECOMMENDATIONS  1. I had a long discussion with patient. Discussed diagnosis, prognosis, pathophysiology and available treatment. Reviewed test results. All questions were answered. 2. I personally reviewed the MRI brain images done 2011  3. Physical therapy referral for gait and balance training  4. Discussed doing an MRI brain, but patient wants to see how she does first with physical therapy  5. Patient already seeing a rheumatologist for her arthritis and was placed on Prednisone  6. Consider occupational therapy referral if patient continues to have cramping of hands despite above      Follow-up Disposition:  Return if symptoms worsen or fail to improve.       Thank you for the consultation      Gopi Ward MD  Diplomate, American Board of Psychiatry and Neurology  Diplomate, Neuromuscular Medicine  Diplomate, American Board of Electrodiagnostic Medicine        CC: Lopez Gutierrez NP  Fax: [phone]

## 2018-11-14 NOTE — PATIENT INSTRUCTIONS
[facility]   Statement to Patients  April 1, 2014      In an effort to ensure the large volume of patient prescription refills is processed in the most efficient and expeditious manner, we are asking our patients to assist us by calling your Pharmacy for all prescription refills, this will include also your  Mail Order Pharmacy. The pharmacy will contact our office electronically to continue the refill process. Please do not wait until the last minute to call your pharmacy. We need at least 48 hours (2days) to fill prescriptions. We also encourage you to call your pharmacy before going to  your prescription to make sure it is ready. With regard to controlled substance prescription refill requests (narcotic refills) that need to be picked up at our office, we ask your cooperation by providing us with at least 72 hours (3days) notice that you will need a refill. We will not refill narcotic prescription refill requests after 4:00pm on any weekday, Monday through Thursday, or after 2:00pm on Fridays, or on the weekends. We encourage everyone to explore another way of getting your prescription refill request processed using [employer], our patient web portal through our electronic medical record system. [employer] is an efficient and effective way to communicate your medication request directly to the office and  downloadable as an armando on your smart phone . [employer] also features a review functionality that allows you to view your medication list as well as leave messages for your physician. Are you ready to get connected? If so please review the attatched instructions or speak to any of our staff to get you set up right away! Thank you so much for your cooperation. Should you have any questions please contact our Practice Administrator.     The Physicians and Staff,  [facility]

## 2018-11-21 ENCOUNTER — OFFICE VISIT (OUTPATIENT)
Dept: BEHAVIORAL/MENTAL HEALTH CLINIC | Age: 63
End: 2018-11-21

## 2018-11-21 VITALS
DIASTOLIC BLOOD PRESSURE: 87 MMHG | HEIGHT: 66 IN | SYSTOLIC BLOOD PRESSURE: 140 MMHG | HEART RATE: 97 BPM | WEIGHT: 107 LBS | BODY MASS INDEX: 17.19 KG/M2

## 2018-11-21 DIAGNOSIS — F15.10 CAFFEINISM (HCC): ICD-10-CM

## 2018-11-21 DIAGNOSIS — T43.625S: ICD-10-CM

## 2018-11-21 DIAGNOSIS — F15.20 CAFFEINE DEPENDENCE (HCC): ICD-10-CM

## 2018-11-21 DIAGNOSIS — F06.34 MOOD DISORDER WITH MIXED FEATURES DUE TO GENERAL MEDICAL CONDITION: Primary | ICD-10-CM

## 2018-11-21 RX ORDER — DIVALPROEX SODIUM 500 MG/1
500 TABLET, EXTENDED RELEASE ORAL
Qty: 30 TAB | Refills: 1 | Status: SHIPPED | OUTPATIENT
Start: 2018-11-21 | End: 2019-01-25

## 2018-11-21 NOTE — PROGRESS NOTES
INITIAL PSYCHIATRIC EVALUATION    IDENTIFICATION:      A. Name: Terra Frank Age:     66 y.o.      C.   MRN: [id number]       D.   CSN:      [id number]      E. Admission Date: (Not on file)       KRYSTIN   :     1955               CHIEF COMPLAINT:  \" I don't know why I am here. \"    Per Dr. Dwayne Tejeda who evaluated her on :  \"  HPI:  Martha Wlikins is a 66 y.o. female who presented to establish her OP psychiatric care. Pt reported having problems with her balance and her memory. She has history of brain aneurysms, states a part of her brain was removed. Since then she is having trouble remembering things. She denies any past psych history. She reported drinking 15 cups of coffee in a day. She states that she gets hyper, curse people around, easily pissed by people. She states that she has no patience. She is taking Celexa off and on without much improvement, had been on  Zoloft in the past, she is currently on Ritalin as well. She was referred by her PCP for psych evaluation. She also saw Dr. Jeff Finney for the first visit. Second visit which includes testing needs to be scheduled. Per PCP note there is an element of non compliance with pt. She has h/o AVM which was diagnosed in  and possible CVA ( pt believes that she has had multiple aneurysms ). Her grand daughter was recently diagnosed with brain tumor which was heart breaking for pt as her world revolves around her. Her grand daughter is currently doing better after her treatment but continues to need care and supervision. Pt states that when she is watching her grand daughter she is very sharp and focused. Pt was overall very scattered, anxious, verbose, pressured, ruminating, and circumstantial upon approach. She denied any TATI, denied any psychotic symptoms, denied any manic symptoms \"    Since then, she has been evaluated by Dr. Mey Mishra and treated by Stephy Cagle NP as late as Oct.80. Mrs. 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 She has been treated with stimulants and SSRIs despite the fact that Dr. Aj Quinn performed a neuropsychological testing that DID NOT [address] DISORDER/ADD OR ADHD, HE FELT IT WAS RELATED  [address]. She has never been hospitalized nor attempted suicides but has been told how \"fragmented and distracted she is with thoughts flying everywhere. \"  She is w/o any sleep disturbance but admits to drinking over 15 cups of coffee, now down to 4 cups with mountain dew, green tea and cigarettes. Of note she has been losing weight, has had irregular pulse and elevated BP. During this session, she lacked the ability to read human body language or expressions and was rambling the whole time about irrelevant topics demonstrating flight of ideas and circumstantialilty. She was hyperverbal ( short of pressured speech) and labile.              PRECIPITATING FACTORS:  CNS surgery    COPING METHODS:  Smoking, distracting self, socializing          Geriatric depression scale:3/15 ( isolates, low energy, poor memory)  Mood Disorder Questionaire scores: 3/13 ( irritable, racing thoughts, distracted)  HAM-A scores: 15/56 ( anxious and tense  With cognitive deficits and CV symptoms)           REVIEW OF  PSYCHIATRIC SYSTEMS:  Patient did not meet criteria for a Major Depressive Disorder ( with or without psychotic features) PTSD, Schizophrenia, Bipolar Affective Disorder, Obsessive Compulsive Disorder, Anxiety Disorder, Agoraphobia, Psychotic disorders or ASD.       PAST PSYCHIATRIC HISTORY:        · Previous psychiatric care: Dr. Carlton Olivarze since June, 2014 until her departure and then Valentin Escobedo  · Previous suicide attempts: none reported  · Previous hospitalizations: none reported  · Current psychotropics: Zoloft, Adderall,  · Previous psychotropics: Celexa,Ritalin Xanax  · Substance use: drinks 15 cups of coffee in a day  ·   Rehab history: none reported  ECT:   none  Legal/Assault History:  none    PAST SUBSTANCE ABUSE HISTORY:  Unremarkable but is a smoker    FAMILY PSYCH HISTORY: Father abused alcohol, denied any mental illnesses or suicides. SOCIAL HISTORY: Pt grew up in [place] with 2 brothers and one sister, but was unhappy at home,did not get along with her siblings much. She completed one yr of college at [employer]. Worked briefly. She is currently , has three children, (two daughters and one son)lives by herself. Denies any abuse growing up      PAST MEDICAL/SURGICAL HISTORY:  AVM resection with cerebellar injury/wide based gait, HTN, s/p hysterectomy,     Current Outpatient Medications   Medication Sig Dispense Refill    divalproex ER (DEPAKOTE ER) 500 mg ER tablet Take 1 Tab by mouth nightly. 30 Tab 1    triamterene-hydroCHLOROthiazide (DYAZIDE) 37.5-25 mg per capsule TAKE ONE CAPSULE BY MOUTH DAILY FOR HYPERTENSION  Indications: hypertension 90 Cap 1    lisinopril (PRINIVIL, ZESTRIL) 10 mg tablet Take 1 Tab by mouth daily. 90 Tab 1    naproxen (NAPROSYN) 500 mg tablet Take 500 mg by mouth two (2) times daily (with meals).  multivitamin (ONE A DAY) tablet Take 1 Tab by mouth daily. For women over 50      predniSONE (DELTASONE) 5 mg tablet Take 1 Tab by mouth two (2) times a day. 60 Tab 0         Medical review of systems mainly considered within normal limits expect as noted in history above. Pertinent LABS: reviewed labs from October 15, 2018 and they were wnl. ALLERGIES:   She has No Known Allergies.       MENTAL STATUS EXAM:  Orientation person, place, time/date and situation    Behavior/Eye contact: Dramatic and intense   Appearance:  Casual, dressed like teenagers   Motor Behavior:  within normal limits   Speech:  hyperverbal   Thought Process: circumstantial and disjointed, flighty   Thought Content free of delusions and free of hallucinations   Suicidal ideations no plan  and no intention   Homicidal ideations no plan  and no intention   Mood:  euphoric and irritable, \" joyfully cranky\"   Affect:  Labile, intenst   Memory recent  adequate Scored 27/30 on the [facility], missed 2/5 words   and drawling of  a square   Memory remote:  adequate   Concentration:  adequate   Abstraction:  abstract   Insight:  poor   Reliability fair   Perceptual disortions  Absent( auditory,visual,olfactory,tactile), patient denied         ASSESSMENT:  The patient is a 66 y.o.  female with a history of Cerebellar injury due to resection of an AVM /aneurysm in 2209. She has been irritable with difficutly following her train of thoughts and is easily distracted but also hyper-verbal.She presents as dramatic and animated. Suicide/Homicide risk : (Nil,Low, Moderate, High): Nil-low    PROVISIONAL DIAGNOSES:    ICD-10-CM ICD-9-CM   1. 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 Caffeinism ([facility] [address] F15.10 305.90   3. Adverse effect of amphetamines, sequela T43.625S 909.5   4. 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 Dispense:  30 Tab     Refill:  1       TREATMENT PLAN:       1. Medications:  (Medication Changes/Adjustments)            Discussed DISCONTIUING ADDERALL AND ZOLOFT FOR NOW, JUST USE DEPAKOTE 500MG EC AT HS. SHE AGREED. HER BLOOD WORK WAS REVIEWED AND DID NOT SHOW ANY ELEVATED HEPATIC PANEL OR LOW PLATELETS. The following regarding medications was addressed: (The risks and benefits of the proposed medication; the potential medication side effects ie. dry mouth, weight gain, GI upset, headache; The  patient was given opportunity to ask questions)               2.  Counseling/ psychotherapy:  Psych-education provided:  Discussed rational versus irrational thinking patterns and their consequences. Discussed healthy/adaptive and unhealthy/maladaptive coping. 3.  Labs/ tests/ old records/ collateral: NA    4. Follow up : 3 weeks  5: If you feel suicidal after hours, please call the [address] [place] [address] @ [phone] OR GO TO THE NEAREST [address]. YOU MAY ALSO ACCESS THE SUICIDE HOTLINE @ \"SPEAKING OF SUICIDE. COM/RESOURCES\"    Referrals/Consults:none    Ms. Jenny Lee has a reminder for a \"due or due soon\" health maintenance. I have asked that she contact her primary care provider for follow-up on this health maintenance. SIGNED:    Xin Che.  Clay Garcia MD,   Adult Psychiatrist/Psychosomatic Medicine  11/21/2018

## 2018-11-30 ENCOUNTER — TELEPHONE (OUTPATIENT)
Dept: RHEUMATOLOGY | Age: 63
End: 2018-11-30

## 2018-11-30 RX ORDER — PREDNISONE 5 MG/1
TABLET ORAL
Qty: 30 TAB | Refills: 1 | Status: SHIPPED | OUTPATIENT
Start: 2018-11-30 | End: 2019-01-15 | Stop reason: ALTCHOICE

## 2018-11-30 RX ORDER — HYDROXYCHLOROQUINE SULFATE 200 MG/1
300 TABLET, FILM COATED ORAL DAILY
Qty: 45 TAB | Refills: 6 | Status: SHIPPED | OUTPATIENT
Start: 2018-11-30 | End: 2019-10-15

## 2018-11-30 NOTE — TELEPHONE ENCOUNTER
Left voicemail with pt to give the office a return call and also informing pt to take the prednisone that was sent to the pharmacy first then start the plaquenil due to the plaquenil taking at least a month to work per Dr Morgan Evangelista

## 2018-11-30 NOTE — TELEPHONE ENCOUNTER
----- Message from Justin Amezquita sent at 11/29/2018 10:11 AM EST -----  Regarding: Dr Cohen/telephone  Pt would like to get the doctor to write the prescription he said he would do for the pt if her(R) hand had swollen up, her hand is very swollen and she couldn't even pull a cup of coffee this morning, pt would like to come and pick-up the prescription today, please call the pt at [phone].

## 2019-01-07 DIAGNOSIS — I10 BENIGN ESSENTIAL HYPERTENSION: ICD-10-CM

## 2019-01-07 NOTE — TELEPHONE ENCOUNTER
PCP: Saúl Toscano NP    Last appt: 9/17/2018  Future Appointments   Date Time Provider Brian Mckenzie   1/15/2019  9:30 AM Jesenia Rodrigues MD [facility]       Requested Prescriptions     Pending Prescriptions Disp Refills    lisinopril (PRINIVIL, ZESTRIL) 10 mg tablet 90 Tab 1     Sig: Take 1 Tab by mouth daily.

## 2019-01-08 RX ORDER — LISINOPRIL 10 MG/1
10 TABLET ORAL DAILY
Qty: 90 TAB | Refills: 0 | Status: SHIPPED | OUTPATIENT
Start: 2019-01-08 | End: 2019-06-27 | Stop reason: SDUPTHER

## 2019-01-15 ENCOUNTER — OFFICE VISIT (OUTPATIENT)
Dept: RHEUMATOLOGY | Age: 64
End: 2019-01-15

## 2019-01-15 VITALS
DIASTOLIC BLOOD PRESSURE: 83 MMHG | SYSTOLIC BLOOD PRESSURE: 137 MMHG | WEIGHT: 111.4 LBS | BODY MASS INDEX: 17.98 KG/M2 | TEMPERATURE: 98.3 F | RESPIRATION RATE: 16 BRPM

## 2019-01-15 DIAGNOSIS — M05.79 SEROPOSITIVE RHEUMATOID ARTHRITIS OF MULTIPLE SITES (HCC): Primary | ICD-10-CM

## 2019-01-15 DIAGNOSIS — M70.60 TROCHANTERIC BURSITIS, UNSPECIFIED LATERALITY: ICD-10-CM

## 2019-01-15 RX ORDER — PREDNISONE 5 MG/1
5 TABLET ORAL 2 TIMES DAILY
Qty: 60 TAB | Refills: 0 | Status: SHIPPED | OUTPATIENT
Start: 2019-01-15 | End: 2019-10-15

## 2019-01-15 NOTE — PROGRESS NOTES
RHEUMATOLOGY PROBLEM LIST AND CHIEF COMPLAINT  1. Inflammatory arthritis - Joint space narrowing of 2nd and 3rd MCPs, morning stiffness, improvement of joint pain throughout the day, one episode of arthritis in right wrist that responded to prednisone, JOSELITO positive, CCP positive, CRP elevated   2. Osteoarthritis     Therapy history:   Current DMARDs: Plaquenil (11/2018-current)    INTERVAL HISTORY  This is a 66 y.o.  female. Today, the patient complains of pain in the joints. Location: wrist  Severity:  3 on a scale of 0-10  Timing: all day   Duration:  1 years  Modifying Factors: Abnormal gait secondary to hip bursitis (improved)  Context/Associated signs and symptoms: She tapered off prednisone completely, and her stiffness returned 1-2 days afterwards. She complains of swelling in her right hand and hip bursitis pain. She started Plaquenil in November.     RHEUMATOLOGY REVIEW OF SYSTEMS   Positives as per history  Negatives as follows:  Cam Bel:  Denies unexplained persistent fevers, weight change, chronic fatigue  HEAD/EYES:   Denies eye redness, blurry vision or sudden loss of vision, dry eyes, HA, temporal artery pain  ENT:    Denies oral/nasal ulcers, recurrent sinus infections, dry mouth  RESPIRATORY:  No pleuritic pain, history of pleural effusions, hemoptysis, exertional dyspnea  CARDIOVASCULAR:  Denies chest pain, history of pericardial effusions  GASTRO:   Denies heartburn, esophageal dysmotility, abdominal pain, nausea, vomiting, diarrhea, blood in the stool  HEMATOLOGIC:  No easy bruising, purpura, swollen lymph nodes  SKIN:    Denies alopecia, ulcers, nodules, sun sensitivity, unexplained persistent rash   VASCULAR:   Denies edema, cyanosis, raynaud phenomenon  NEUROLOGIC:  Denies specific muscle weakness, paresthesias   PSYCHIATRIC:  No sleep disturbance / snoring, depression, anxiety  MSK:    No morning stiffness >1 hour, SI joint pain    PAST MEDICAL HISTORY  Reviewed with patient, significant changes in medical history - no    PHYSICAL EXAM  Blood pressure 137/83, temperature 98.3 °F (36.8 °C), temperature source Oral, resp. rate 16, weight 111 lb 6.4 oz (50.5 kg). GENERAL APPEARANCE: Well-nourished, no acute distress  NECK: No adenopathy  ENT: No oral ulcers  CARDIOVASCULAR: Heart rhythm is regular. No murmur, rub, gallop  CHEST: Normal vesicular breath sounds. No wheezes, rales, pleural friction rubs  ABDOMINAL: The abdomen is soft and nontender. Bowel sounds are normal  SKIN: No rash, palpable purpura, digital ulcer, abnormal thickening   MUSCULOSKELETAL:   Upper extremities - Heberden's and Sebastian's nodes. B/l hand synovial thickening over MCP joints (R>L), no synovitis. Subtle swelling PIPs and MCPs of the right hand. R elbow tenderness. Lower extremities - R lateral thigh tenderness secondary to trochanteric bursitis - unchanged. LABS, RADIOLOGY AND PROCEDURES   Previous labs reviewed -Yes    ASSESSMENT  1. Inflammatory arthritis (Established problem -  Very good partial response) - She has worsened on exam today. She should continue with Plaquenil 200 mg daily. I will start her on prednisone 10 mg daily, taper as directed to control the inflammation while Plaquenil takes effect. She should return in 3 months for a follow up. 2. Osteoarthritis - ROM exercises are recommended for now. 3. Trochanteric bursitis - I gave her a right bursa injection today, 80 mg IM. PLAN  1. Prednisone 10 mg daily; taper as directed  2. Plaquenil 200 mg daily  3. Right trochanteric bursa injection  4. Return in 3 months    Avis Johansen MD  Adult and Pediatric Rheumatology     [address] [place] [address], Phone [phone], Fax [phone]   E-mail: [email or web address]    Visiting  of Pediatrics    Department of Pediatrics, [address], [address] [place] [address], Phone [phone], Fax [phone]  E-mail: [email or web address]    Patient Instructions   Prednisone 10mg x 1 week  Prednisone 7.5mg x 1 week  Prednisone 5mg x 1 week  Prednisone 2.5mg x 1 week        cc:  Monserrat Dent NP    Written by william Harrison, as dictated by Bg Bravo. DAVON Andres reviewed for the following:   Michelle MEZA, have reviewed the History, Physical and updated the Allergic reactions for [address] performed immediately prior to start of procedure:   Michelle MEZA, have performed the following reviews on Doren Rang prior to the start of the procedure:            * Patient was identified by name and date of birth   * Agreement on procedure being performed was verified  * Risks and Benefits explained to the patient  * Procedure site verified and marked as necessary  * Patient was positioned for comfort  * Consent was signed and verified     Time: 10:20      Date of procedure: 1/15/2019    Procedure performed by: Nehemiah Irving MD    Provider assisted by: none    Patient assisted by: self    How tolerated by patient: tolerated the procedure well with no complications    Post Procedural Pain Scale: 0 - No Hurt    Comments: none    PROCEDURE  After consent was obtained, using sterile technique the right trochanteric bursa was prepped and Kenalog 80 mg and 1ml of lidocaine was then injected and the needle withdrawn. The procedure was well tolerated. The patient is asked to continue to rest for 24 hours before resuming regular activities. It may be more painful for the first 1-2 days. Watch for fever, or increased swelling or persistent pain. Call or return to clinic prn if such symptoms occur.

## 2019-01-15 NOTE — PATIENT INSTRUCTIONS
Prednisone 10mg x 1 week  Prednisone 7.5mg x 1 week  Prednisone 5mg x 1 week  Prednisone 2.5mg x 1 week

## 2019-01-22 RX ORDER — TRIAMCINOLONE ACETONIDE 40 MG/ML
80 INJECTION, SUSPENSION INTRA-ARTICULAR; INTRAMUSCULAR ONCE
Qty: 1 ML | Refills: 0
Start: 2019-01-22 | End: 2019-01-22

## 2019-01-22 RX ORDER — LIDOCAINE HYDROCHLORIDE 10 MG/ML
1 INJECTION, SOLUTION EPIDURAL; INFILTRATION; INTRACAUDAL; PERINEURAL ONCE
Qty: 1 ML | Refills: 0
Start: 2019-01-22 | End: 2019-01-22

## 2019-01-25 ENCOUNTER — OFFICE VISIT (OUTPATIENT)
Dept: INTERNAL MEDICINE CLINIC | Age: 64
End: 2019-01-25

## 2019-01-25 VITALS
RESPIRATION RATE: 18 BRPM | HEART RATE: 92 BPM | OXYGEN SATURATION: 99 % | TEMPERATURE: 98.9 F | HEIGHT: 66 IN | DIASTOLIC BLOOD PRESSURE: 76 MMHG | WEIGHT: 109.8 LBS | SYSTOLIC BLOOD PRESSURE: 122 MMHG | BODY MASS INDEX: 17.64 KG/M2

## 2019-01-25 DIAGNOSIS — M19.90 INFLAMMATORY ARTHRITIS: ICD-10-CM

## 2019-01-25 DIAGNOSIS — F39 MOOD DISORDER (HCC): Primary | ICD-10-CM

## 2019-01-25 DIAGNOSIS — F41.8 DEPRESSION WITH ANXIETY: ICD-10-CM

## 2019-01-25 DIAGNOSIS — R41.840 INATTENTION: ICD-10-CM

## 2019-01-25 DIAGNOSIS — Z87.74 HISTORY OF ARTERIOVENOUS MALFORMATION (AVM): ICD-10-CM

## 2019-01-25 DIAGNOSIS — R13.10 DYSPHAGIA, UNSPECIFIED TYPE: ICD-10-CM

## 2019-01-25 DIAGNOSIS — Z98.890 HISTORY OF CRANIOTOMY: ICD-10-CM

## 2019-01-25 RX ORDER — DEXTROAMPHETAMINE SACCHARATE, AMPHETAMINE ASPARTATE, DEXTROAMPHETAMINE SULFATE AND AMPHETAMINE SULFATE 5; 5; 5; 5 MG/1; MG/1; MG/1; MG/1
20 TABLET ORAL DAILY
Qty: 30 TAB | Refills: 0 | Status: SHIPPED | OUTPATIENT
Start: 2019-01-25 | End: 2019-02-04 | Stop reason: SDUPTHER

## 2019-01-25 RX ORDER — SERTRALINE HYDROCHLORIDE 50 MG/1
50 TABLET, FILM COATED ORAL DAILY
Qty: 30 TAB | Refills: 1 | Status: SHIPPED | OUTPATIENT
Start: 2019-01-25 | End: 2019-05-20 | Stop reason: SDUPTHER

## 2019-01-25 NOTE — PROGRESS NOTES
Subjective:  
  
Karla Anderson is a 66 y.o. female who presents today for follow up of anxiety and depression Anxiety Depression: 
Now established with Dr. Radha Prieto.  Sertraline and adderall were stopped. Was switched to depakote 500mg. Has not taken. Did not feel a connection with Dr. Radha Prieto and wants new psychiatric referral 
Patient feels that she had been doing very well with sertraline and adderall, felt much better. Not sure why Has been feeling a little bit more tense, irritable since no medications Unable to concentrate, inattention 
  
Stopped coffee Now drinking green tea with honey and lemon Denies chest pain, palpitations, or dyspnea Denies suicidal or homicidal ideaitons Inflammatory Arthritis: 
Following with Dr. Deidre Curiel Taking plaquenil Prednisone taper History of AVM with suboccipital craniotomy Off balance Feeling of weakness in back of head, feels that she has difficulty holding her head up Patient would like to see neurosurgery for follow up Dysphagia Difficulty swallowing, feeling that food hard to get down No choking or food impaction Pt wondering if from scar tissue related to previous trach, wondering about ballooning of esophagous No coughing with eating/drinking Overdue for colonoscopy as well. No abdominal pain, changes in bowels or blood in stool 
  
 
Patient Active Problem List  
 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 Refills must come from psych 30 Tab 1  
 dextroamphetamine-amphetamine (ADDERALL) 20 mg tablet Take 1 Tab (20 mg total) by mouth daily. Refills must come from psychiatry Max Daily Amount: 20 mg 30 Tab 0  predniSONE (DELTASONE) 5 mg tablet Take 1 Tab by mouth two (2) times a day. 60 Tab 0  
 lisinopril (PRINIVIL, ZESTRIL) 10 mg tablet Take 1 Tab by mouth daily. 90 Tab 0  
 hydroxychloroquine (PLAQUENIL) 200 mg tablet Take 1.5 Tabs by mouth daily. 45 Tab 6  
 triamterene-hydroCHLOROthiazide (DYAZIDE) 37.5-25 mg per capsule TAKE ONE CAPSULE BY MOUTH DAILY FOR HYPERTENSION  Indications: hypertension 90 Cap 1  
 multivitamin (ONE A DAY) tablet Take 1 Tab by mouth daily. For women over 48 Review of Systems Pertinent items are noted in HPI. Objective:  
 
Visit Vitals /76 (BP 1 Location: Left arm, BP Patient Position: Sitting) Pulse 92 Temp 98.9 °F (37.2 °C) (Oral) Resp 18 Ht 5' 6\" (1.676 m) Wt 109 lb 12.8 oz (49.8 kg) SpO2 99% BMI 17.72 kg/m² General appearance: alert, cooperative, no distress, appears stated age, thin Head: Normocephalic, without obvious abnormality, atraumatic Neck: trachea midline, no cervical adenopathy, no thyromegaly, nodules, or tenderness Lungs: clear to auscultation bilaterally Heart: regular rate and rhythm Skin: Skin color, texture, turgor normal 
Neurologic: Grossly normal 
Psych: flight of ideas, distracted, tangential thinking, nonsuicidal 
 
 
Assessment/Plan: 1. Mood disorder ([address] 
- needs to reestablish with psychiatry, referral to Dr. Tip Huff as patient refusing to see Dr. Jocy Reagan 
- refusing depakote 
- will restart sertraline and adderall x 1 month, however further refills need to come from psych. This was discussed with patient, patient agreeable 2. Depression with anxiety 
- as above 
- nonsucidial 
- REFERRAL TO PSYCHIATRY 
- sertraline (ZOLOFT) 50 mg tablet; Take 1 Tab by mouth daily. Refills must come from psych  Dispense: 30 Tab; Refill: 1 
- dextroamphetamine-amphetamine (ADDERALL) 20 mg tablet; Take 1 Tab (20 mg total) by mouth daily. Refills must come from psychiatry Max Daily Amount: 20 mg  Dispense: 30 Tab; Refill: 0 
 
3. Inattention -  reviewed 1/25/2019  
- further refills to come from psych as above 
- dextroamphetamine-amphetamine (ADDERALL) 20 mg tablet; Take 1 Tab (20 mg total) by mouth daily. Refills must come from psychiatry Max Daily Amount: 20 mg  Dispense: 30 Tab; Refill: 0 
 
4. Dysphagia, unspecified type 
- REFERRAL TO GASTROENTEROLOGY 5. History of arteriovenous malformation (AVM) 
- REFERRAL TO NEUROSURGERY 6. History of craniotomy 
- as above 7. Inflammatory arthritis 
- cont following with Dr. Ana Lilia Bowling and current medications Advised her to call back or return to office if symptoms worsen/change/persist. 
Discussed expected course/resolution/complications of diagnosis in detail with patient. Medication risks/benefits/costs/interactions/alternatives discussed with patient. She was given an after visit summary which includes diagnoses, current medications, & vitals. She expressed understanding with the diagnosis and plan.  
 
MAURO Posadas

## 2019-01-25 NOTE — PATIENT INSTRUCTIONS
Schedule with: 
 
Dr. Clementine Ervin, Psychiatry Dr. Neil Frye, Neurosurgery Dr. Love Morris or Dr. Staples Call with GI You need to see Psychiatry, Dr. Clementine Ervin, for further management of your depression/anxiety/inattention

## 2019-01-25 NOTE — PROGRESS NOTES
Reviewed record in preparation for visit and have obtained necessary documentation. Identified pt with two pt identifiers(name and ). Health Maintenance Due Topic  COLONOSCOPY  Shingrix Vaccine Age 50> (1 of 2)  BREAST CANCER SCRN MAMMOGRAM   
 
 
 
Chief Complaint Patient presents with  Depression f/u  Anxiety Palpitations f/u Wt Readings from Last 3 Encounters:  
19 109 lb 12.8 oz (49.8 kg) 01/15/19 111 lb 6.4 oz (50.5 kg)  
18 107 lb (48.5 kg) Temp Readings from Last 3 Encounters:  
01/15/19 98.3 °F (36.8 °C) (Oral)  
18 98.1 °F (36.7 °C) (Oral) 10/15/18 98.5 °F (36.9 °C) (Oral) BP Readings from Last 3 Encounters:  
01/15/19 137/83  
18 140/87  
18 152/80 Pulse Readings from Last 3 Encounters:  
18 97  
18 (!) 126  
18 84 Learning Assessment: 
:  
 
Learning Assessment 1/15/2019 2018 2018 10/15/2018 3/29/2018 2017 2015 PRIMARY LEARNER Patient Patient Patient Patient Patient Patient Patient HIGHEST LEVEL OF EDUCATION - PRIMARY LEARNER  - - - - SOME COLLEGE SOME COLLEGE SOME COLLEGE  
BARRIERS PRIMARY LEARNER - - - - NONE NONE NONE  
CO-LEARNER CAREGIVER No - No No No No No  
PRIMARY LANGUAGE ENGLISH ENGLISH ENGLISH ENGLISH ENGLISH ENGLISH ENGLISH  NEED - - - - - - No  
LEARNER PREFERENCE PRIMARY DEMONSTRATION DEMONSTRATION DEMONSTRATION DEMONSTRATION PICTURES VIDEOS VIDEOS  
  - - - - - - - LEARNING SPECIAL TOPICS - - - - - - no ANSWERED BY patient patient  patient patient patient patient patient RELATIONSHIP SELF SELF SELF SELF SELF SELF SELF  
ASSESSMENT COMMENT - - - - - - none Depression Screening: 
:  
 
PHQ over the last two weeks 3/20/2018 PHQ Not Done Active Diagnosis of Depression or Bipolar Disorder Fall Risk Assessment: 
:  
 
Fall Risk Assessment, last 12 mths 2019 Able to walk? Yes Fall in past 12 months?  No  
 
 
 Abuse Screening: 
:  
 
Abuse Screening Questionnaire 3/29/2018 5/2/2017 9/18/2015 8/7/2014 Do you ever feel afraid of your partner? N N N N Are you in a relationship with someone who physically or mentally threatens you? N N N N Is it safe for you to go home? Edmundo Lombardo Coordination of Care Questionnaire: 
:  
 
1) Have you been to an emergency room, urgent care clinic since your last visit? no  
Hospitalized since your last visit? no          
 
2) Have you seen or consulted any other health care providers outside of [address] [place] [address] since your last visit? no  (Include any pap smears or colon screenings in this section.) 3) Do you have an Advance Directive on file? yes 4) Are you interested in receiving information on Advance Directives? NO Patient is accompanied by self I have received verbal consent from Norberto Sommers to discuss any/all medical information while they are present in the room.

## 2019-01-25 NOTE — LETTER
1/25/2019 11:12 AM 
 
 
 
 
Ms. Augustine Chaudhary [address] [facility] [address] [id number] To Whom it May Concern: Ms. Elizabeth Fernandez is currently under my medical care. She is currently going through the process of applying for a renewal of her disability. She is disabled to due her history of AVM, possible CVA, mood disorder with mixed features, depression, anxiety disorder, and inattention. Please excuse her from jury duty due to medical reasons. I do not believe she is currently fit to serve as a juror. Sincerely, Nicki Ward NP

## 2019-02-04 DIAGNOSIS — F41.8 DEPRESSION WITH ANXIETY: ICD-10-CM

## 2019-02-04 DIAGNOSIS — R41.840 INATTENTION: ICD-10-CM

## 2019-02-04 RX ORDER — DEXTROAMPHETAMINE SACCHARATE, AMPHETAMINE ASPARTATE, DEXTROAMPHETAMINE SULFATE AND AMPHETAMINE SULFATE 5; 5; 5; 5 MG/1; MG/1; MG/1; MG/1
20 TABLET ORAL DAILY
Qty: 30 TAB | Refills: 0 | Status: SHIPPED | OUTPATIENT
Start: 2019-02-04 | End: 2019-05-20 | Stop reason: SDUPTHER

## 2019-02-04 NOTE — TELEPHONE ENCOUNTER
CHON Quan LPN 33 minutes ago (8:03 PM)     pls let her know is ready, further refills need to come from psych as we discussed at her appt. Thanks! Di Grover NP   (Routing comment)      Patient's requested prescription is ready for . Patient informed via telephone, two identifiers verified. Advised [facility] is open Monday-Friday between 8AM-5PM, and to bring photo ID. Rx placed up front for patient . Patient verbalizes understanding.

## 2019-02-04 NOTE — TELEPHONE ENCOUNTER
Requested Prescriptions     Pending Prescriptions Disp Refills    dextroamphetamine-amphetamine (ADDERALL) 20 mg tablet 30 Tab 0     Sig: Take 1 Tab (20 mg total) by mouth daily. Refills must come from psychiatry Max Daily Amount: 20 mg     Patient lost the script. she would like a new one.  01/25/2019 05/24/2019

## 2019-02-19 ENCOUNTER — OFFICE VISIT (OUTPATIENT)
Dept: INTERNAL MEDICINE CLINIC | Age: 64
End: 2019-02-19

## 2019-02-19 VITALS
HEIGHT: 66 IN | WEIGHT: 110 LBS | TEMPERATURE: 98.1 F | SYSTOLIC BLOOD PRESSURE: 122 MMHG | HEART RATE: 82 BPM | DIASTOLIC BLOOD PRESSURE: 72 MMHG | BODY MASS INDEX: 17.68 KG/M2 | RESPIRATION RATE: 16 BRPM

## 2019-02-19 DIAGNOSIS — M79.674 GREAT TOE PAIN, RIGHT: Primary | ICD-10-CM

## 2019-02-19 NOTE — PROGRESS NOTES
HISTORY OF PRESENT ILLNESS  Hayden Lewis is a 66 y.o. female. Patient reports pain under right great toe x 6 months. It seems to be getting worse. She states she banged it on the nightstand months ago, but is unsure of the timing. Denies any swelling or redness. She feels like it looks bruised at times. Pain is worse with ambulation. She has soaked in epsom salt and kept wrapped with padded bandaid. She always wear supportive shoes, such as tennis shoes. Patient has history of arthritis. Visit Vitals  /72 (BP 1 Location: Left arm, BP Patient Position: Sitting)   Pulse 82   Temp 98.1 °F (36.7 °C) (Oral)   Resp 16   Ht 5' 6\" (1.676 m)   Wt 110 lb (49.9 kg)   BMI 17.75 kg/m²       HPI    Review of Systems   Musculoskeletal:        Right great toe pain       Physical Exam   Constitutional: She appears well-developed and well-nourished. Musculoskeletal:   Pinpoint pain with palpation of center of plantar surface of right great toe; no nodule or mass, no foreign body noted. Patient has callus noted to lateral toe, but no pain in that area; pain does not radiate; no increased pain with flexion of toe. Pain only worse when pressure applied to that area   Skin: Skin is warm and dry. ASSESSMENT and PLAN    ICD-10-CM ICD-9-CM    1.  Great toe pain, right M79.674 729.5 REFERRAL TO PODIATRY      XR FOOT RT MIN 3 V     Orders Placed This Encounter    XR FOOT RT MIN 3 V    REFERRAL TO PODIATRY

## 2019-03-12 ENCOUNTER — TELEPHONE (OUTPATIENT)
Dept: INTERNAL MEDICINE CLINIC | Age: 64
End: 2019-03-12

## 2019-03-12 NOTE — TELEPHONE ENCOUNTER
----- Message from [address] [place] [address] sent at 3/12/2019 12:17 PM EDT -----  Regarding: CHON Barrow / telephone  Alena Madrigal) called to speak with CHON Barrow. Stated patient applied for and \"Accomodation and the CHON Barrow completed forms and needed to ask some additional questions.        Best contact [phone]

## 2019-03-19 ENCOUNTER — OFFICE VISIT (OUTPATIENT)
Dept: BEHAVIORAL/MENTAL HEALTH CLINIC | Age: 64
End: 2019-03-19

## 2019-03-19 VITALS
SYSTOLIC BLOOD PRESSURE: 153 MMHG | BODY MASS INDEX: 17.68 KG/M2 | HEIGHT: 66 IN | DIASTOLIC BLOOD PRESSURE: 87 MMHG | HEART RATE: 93 BPM | WEIGHT: 110 LBS

## 2019-03-19 DIAGNOSIS — F06.34 MOOD DISORDER WITH MIXED FEATURES DUE TO GENERAL MEDICAL CONDITION: Primary | ICD-10-CM

## 2019-03-19 NOTE — TELEPHONE ENCOUNTER
Michael De La O with [facility] has a few questions to ask Alize Wick in order to complete a form for this patient, she states she called last week but has not received a return call, please call.
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
## 2019-05-15 ENCOUNTER — OFFICE VISIT (OUTPATIENT)
Dept: INTERNAL MEDICINE CLINIC | Age: 64
End: 2019-05-15

## 2019-05-15 VITALS
HEIGHT: 66 IN | BODY MASS INDEX: 17.28 KG/M2 | HEART RATE: 62 BPM | WEIGHT: 107.5 LBS | TEMPERATURE: 99.9 F | OXYGEN SATURATION: 95 %

## 2019-05-15 DIAGNOSIS — J02.9 SORE THROAT: Primary | ICD-10-CM

## 2019-05-15 LAB
S PYO AG THROAT QL: NEGATIVE
VALID INTERNAL CONTROL?: YES

## 2019-05-15 NOTE — PATIENT INSTRUCTIONS
Sore Throat: Care Instructions  Your Care Instructions    Infection by bacteria or a virus causes most sore throats. Cigarette smoke, dry air, air pollution, allergies, and yelling can also cause a sore throat. Sore throats can be painful and annoying. Fortunately, most sore throats go away on their own. If you have a bacterial infection, your doctor may prescribe antibiotics. Follow-up care is a key part of your treatment and safety. Be sure to make and go to all appointments, and call your doctor if you are having problems. It's also a good idea to know your test results and keep a list of the medicines you take. How can you care for yourself at home? · If your doctor prescribed antibiotics, take them as directed. Do not stop taking them just because you feel better. You need to take the full course of antibiotics. · Gargle with warm salt water once an hour to help reduce swelling and relieve discomfort. Use 1 teaspoon of salt mixed in 1 cup of warm water. · Take an over-the-counter pain medicine, such as acetaminophen (Tylenol), ibuprofen (Advil, Motrin), or naproxen (Aleve). Read and follow all instructions on the label. · Be careful when taking over-the-counter cold or flu medicines and Tylenol at the same time. Many of these medicines have acetaminophen, which is Tylenol. Read the labels to make sure that you are not taking more than the recommended dose. Too much acetaminophen (Tylenol) can be harmful. · Drink plenty of fluids. Fluids may help soothe an irritated throat. Hot fluids, such as tea or soup, may help decrease throat pain. · Use over-the-counter throat lozenges to soothe pain. Regular cough drops or hard candy may also help. These should not be given to young children because of the risk of choking. · Do not smoke or allow others to smoke around you. If you need help quitting, talk to your doctor about stop-smoking programs and medicines.  These can increase your chances of quitting for good. · Use a vaporizer or humidifier to add moisture to your bedroom. Follow the directions for cleaning the machine. When should you call for help? Call your doctor now or seek immediate medical care if:    · You have new or worse trouble swallowing.     · Your sore throat gets much worse on one side.    Watch closely for changes in your health, and be sure to contact your doctor if you do not get better as expected. Where can you learn more? Go to http://farooq-heather.info/. Enter [phone] [id number] [phone] in the search box to learn more about \"Sore Throat: Care Instructions. \"  Current as of: March 27, 2018  Content Version: 11.9  © 4306-2584 [employer]. Care instructions adapted under license by [employer] (which disclaims liability or warranty for this information). If you have questions about a medical condition or this instruction, always ask your healthcare professional. [address] any warranty or liability for your use of this information.

## 2019-05-15 NOTE — PROGRESS NOTES
HISTORY OF PRESENT ILLNESS  Bee Fong is a 66 y.o. female. Patient reports sore throat, low-grade fever, and chills since early this morning. She has tried hot tea with some relief. Denies congestion. Visit Vitals  Pulse 62   Temp 99.9 °F (37.7 °C) (Oral)   Ht 5' 6\" (1.676 m)   Wt 107 lb 8 oz (48.8 kg)   SpO2 95%   BMI 17.35 kg/m²       HPI    Review of Systems   HENT: Positive for sore throat. Physical Exam   Constitutional: She is oriented to person, place, and time. She appears well-developed and well-nourished. HENT:   Head: Normocephalic. Right Ear: Hearing, tympanic membrane, external ear and ear canal normal.   Left Ear: Hearing, tympanic membrane and ear canal normal.   Nose: Mucosal edema present. Mouth/Throat: Uvula is midline and mucous membranes are normal. Posterior oropharyngeal erythema present. Neck: Normal range of motion. Neck supple. Cardiovascular: Normal rate, regular rhythm and normal heart sounds. Pulmonary/Chest: Effort normal and breath sounds normal.   Lymphadenopathy:     She has no cervical adenopathy. Neurological: She is alert and oriented to person, place, and time. Skin: Skin is warm and dry. Psychiatric: She has a normal mood and affect. Healing left ear wound; no drainage, but mild erythema and excoriation; no swelling    Results for orders placed or performed in visit on 05/15/19   AMB POC RAPID STREP A   Result Value Ref Range    VALID INTERNAL CONTROL POC Yes     Group A Strep Ag Negative Negative       ASSESSMENT and PLAN    ICD-10-CM ICD-9-CM    1.  Sore throat J02.9 462 AMB POC RAPID STREP A     Orders Placed This Encounter    AMB POC RAPID STREP A   motrin or tylenol for pain/fever  hydrate

## 2019-05-17 ENCOUNTER — TELEPHONE (OUTPATIENT)
Dept: INTERNAL MEDICINE CLINIC | Age: 64
End: 2019-05-17

## 2019-05-17 NOTE — TELEPHONE ENCOUNTER
Patient states she stills has a cough. Patient states that she is coughing more. Her cough is more productive. She states that she is coughing up yellow mucous. She states that she is taking naproxen every 12 hours, ibuprofen every 4 hours, and liquid gels caps, and Coricidin HBP as needed. Patient states that at night time her temperature is going up to 99.7. Patient is having some rib pain that she states comes from coughing. Informed patient that if her symptoms continue she was advised to return to clinic. Patient denied appointment at this time. Advised patient that if her symptoms get worse that she need go to ED or urgent care. Also advised to call office back to schedule an appointment if her symptoms are continuing over the weekend. Patient verbalized an understanding.

## 2019-05-17 NOTE — TELEPHONE ENCOUNTER
Patient states she is not feeling any better, she saw Misael Ramires on 05/15 for a sore throat, which she states is not as bad as it was, but still hurts. She also has a cough and low grade fever, she has been taking OTC Mucinex, Coricidin and Naproxen. Please advise if there is anything else she should be doing.

## 2019-05-18 ENCOUNTER — HOSPITAL ENCOUNTER (EMERGENCY)
Age: 64
Discharge: HOME | End: 2019-05-18
Attending: EMERGENCY MEDICINE | Admitting: EMERGENCY MEDICINE
Payer: MEDICARE

## 2019-05-18 VITALS
SYSTOLIC BLOOD PRESSURE: 128 MMHG | WEIGHT: 110.23 LBS | HEART RATE: 101 BPM | DIASTOLIC BLOOD PRESSURE: 82 MMHG | BODY MASS INDEX: 17.79 KG/M2 | OXYGEN SATURATION: 100 % | RESPIRATION RATE: 16 BRPM | TEMPERATURE: 99.1 F

## 2019-05-18 DIAGNOSIS — J06.9 VIRAL URI WITH COUGH: Primary | ICD-10-CM

## 2019-05-18 DIAGNOSIS — J02.9 SORE THROAT: ICD-10-CM

## 2019-05-18 DIAGNOSIS — R09.82 POST-NASAL DRIP: ICD-10-CM

## 2019-05-18 PROCEDURE — 74011250636 HC RX REV CODE- 250/636: Performed by: EMERGENCY MEDICINE

## 2019-05-18 PROCEDURE — 99283 EMERGENCY DEPT VISIT LOW MDM: CPT

## 2019-05-18 RX ORDER — PSEUDOEPHEDRINE HCL 120 MG/1
120 TABLET, FILM COATED, EXTENDED RELEASE ORAL
Qty: 6 TAB | Refills: 0 | Status: SHIPPED | OUTPATIENT
Start: 2019-05-18 | End: 2019-05-21

## 2019-05-18 RX ORDER — DEXAMETHASONE 4 MG/1
10 TABLET ORAL
Status: COMPLETED | OUTPATIENT
Start: 2019-05-18 | End: 2019-05-18

## 2019-05-18 RX ORDER — OXYMETAZOLINE HCL 0.05 %
2 SPRAY, NON-AEROSOL (ML) NASAL 2 TIMES DAILY
Qty: 1 EACH | Refills: 0 | Status: SHIPPED | OUTPATIENT
Start: 2019-05-18 | End: 2019-05-21

## 2019-05-18 RX ADMIN — DEXAMETHASONE 10 MG: 4 TABLET ORAL at 12:02

## 2019-05-18 NOTE — ED NOTES
Pt d/c'd home. No IV, pt given d/c instructions, verbalized understanding. Declined w/c and left ambulatory in care of self in stable condition

## 2019-05-18 NOTE — ED PROVIDER NOTES
The history is provided by the patient. Sore Throat This is a new problem. There has been no fever. Associated symptoms include congestion (nasal and sinus) and cough. Pertinent negatives include no diarrhea, no vomiting, no headaches, no trouble swallowing and no stiff neck. She has had no exposure to strep. Treatments tried: antihistamines. The treatment provided no relief. Past Medical History:  
Diagnosis Date  ADD (attention deficit disorder)  Arthritis  AVM (arteriovenous malformation) brain 2009  CVA (cerebral infarction) 2009  Hypertension  Inattention Past Surgical History:  
Procedure Laterality Date  HX HEENT    
 HX HYSTERECTOMY  HX ORTHOPAEDIC    
 NEUROLOGICAL PROCEDURE UNLISTED    
 anurism surgery, AVM malformatiom Family History:  
Problem Relation Age of Onset  Hypertension Mother  Elevated Lipids Mother  Diabetes Father  Heart Attack Maternal Grandmother  Diabetes Paternal Grandmother  Diabetes Paternal Grandfather Social History Socioeconomic History  Marital status:  Spouse name: Not on file  Number of children: Not on file  Years of education: Not on file  Highest education level: Not on file Occupational History  Not on file Social Needs  Financial resource strain: Not on file  Food insecurity:  
  Worry: Not on file Inability: Not on file  Transportation needs:  
  Medical: Not on file Non-medical: Not on file Tobacco Use  Smoking status: Current Every Day Smoker Packs/day: 0.50 Years: 16.00 Pack years: 8.00  Smokeless tobacco: Never Used Substance and Sexual Activity  Alcohol use: Yes Alcohol/week: 1.2 oz Types: 1 Glasses of wine, 1 Cans of beer per week  Drug use: No  
 Sexual activity: Not Currently Lifestyle  Physical activity:  
  Days per week: Not on file Minutes per session: Not on file  Stress: Not on file Relationships  Social connections:  
  Talks on phone: Not on file Gets together: Not on file Attends [employer] service: Not on file Active member of club or organization: Not on file Attends meetings of clubs or organizations: Not on file Relationship status: Not on file  Intimate partner violence:  
  Fear of current or ex partner: Not on file Emotionally abused: Not on file Physically abused: Not on file Forced sexual activity: Not on file Other Topics Concern  Not on file Social History Narrative , her ex  has passed away, one son in [facility], two daughters- one in [facility] and one in [place]. ALLERGIES: Patient has no known allergies. Review of Systems HENT: Positive for congestion (nasal and sinus) and sore throat. Negative for trouble swallowing. Respiratory: Positive for cough. Gastrointestinal: Negative for diarrhea and vomiting. Neurological: Negative for headaches. All other systems reviewed and are negative. Vitals:  
 05/18/19 1026 BP: (!) 132/97 Pulse: (!) 107 Resp: 16 Temp: 99.1 °F (37.3 °C) SpO2: 100% Weight: 50 kg (110 lb 3.7 oz) Physical Exam  
Constitutional: She appears well-developed and well-nourished. No distress. HENT:  
Head: Normocephalic and atraumatic. Mouth/Throat: Uvula is midline and mucous membranes are normal. Posterior oropharyngeal erythema (mild) present. No oropharyngeal exudate, posterior oropharyngeal edema or tonsillar abscesses. No tonsillar exudate. Eyes: Conjunctivae are normal.  
Neck: Neck supple. Cardiovascular: Normal rate, regular rhythm and normal heart sounds. Pulmonary/Chest: Effort normal and breath sounds normal. No stridor. No respiratory distress. She has no wheezes. She has no rales. Abdominal: Soft. She exhibits no distension. Musculoskeletal: Normal range of motion. She exhibits no deformity. Neurological: She is alert. No cranial nerve deficit. Skin: Skin is warm and dry. Psychiatric: Her behavior is normal.  
Nursing note and vitals reviewed. MDM 
  
66 y.o. female presents with cough, sore throat and nasal congestion over last few days. No signs of respiratory distress, non-toxic appearing, CTAB, no concern for pneumonia with this clinical picture. No emergent testing indicated at this time. Pt discharged with likely viral cough which will be self limited in its course. Provided instructions for supportive care measures. Plan to follow up with PCP as needed and return precautions discussed for worsening or new concerning symptoms. Procedures

## 2019-05-20 ENCOUNTER — TELEPHONE (OUTPATIENT)
Dept: INTERNAL MEDICINE CLINIC | Age: 64
End: 2019-05-20

## 2019-05-20 DIAGNOSIS — R41.840 INATTENTION: ICD-10-CM

## 2019-05-20 DIAGNOSIS — F41.8 DEPRESSION WITH ANXIETY: ICD-10-CM

## 2019-05-20 RX ORDER — DEXTROAMPHETAMINE SACCHARATE, AMPHETAMINE ASPARTATE, DEXTROAMPHETAMINE SULFATE AND AMPHETAMINE SULFATE 5; 5; 5; 5 MG/1; MG/1; MG/1; MG/1
20 TABLET ORAL DAILY
Qty: 30 TAB | Refills: 0 | Status: SHIPPED | OUTPATIENT
Start: 2019-05-20 | End: 2019-10-15

## 2019-05-20 RX ORDER — SERTRALINE HYDROCHLORIDE 50 MG/1
50 TABLET, FILM COATED ORAL DAILY
Qty: 30 TAB | Refills: 1 | Status: SHIPPED | OUTPATIENT
Start: 2019-05-20 | End: 2019-06-12 | Stop reason: SDUPTHER

## 2019-05-20 NOTE — TELEPHONE ENCOUNTER
Call to Ms. Bateman regarding Rx refill. Verified self and birth date for privacy precautions. she was advised her refill was completed & is now available for  from the front office staff at her convenience. Advised the office is open Monday - Friday 8AM until 5PM & she or anyone delegated by her to  prescriptions must be listed be listed on the HIPPA authorization form and show a valid ID to obtain Rx. Patient verbalized understanding.     Patient is aware that further refills must come from psychiatry

## 2019-05-20 NOTE — TELEPHONE ENCOUNTER
Patient was seen in the ER for her sinus infection, she was given a dose of steroids and started feeling better, but Sunday she started feeling bad again. She is running a low grade fever and is very congested, please advise.

## 2019-05-20 NOTE — TELEPHONE ENCOUNTER
Last refill- Adderall 2/4/19, Zoloft 1/25/19  Last office visit - 5/15/2019  Next office visit -   Future Appointments   Date Time Provider Brian Mckenzie   5/24/2019 11:00 AM CHON Lopez         Requested Prescriptions     Pending Prescriptions Disp Refills    sertraline (ZOLOFT) 50 mg tablet 30 Tab 1     Sig: Take 1 Tab by mouth daily. Refills must come from psych    dextroamphetamine-amphetamine (ADDERALL) 20 mg tablet 30 Tab 0     Sig: Take 1 Tab by mouth daily. Max Daily Amount: 20 mg.  Refills must come from psychiatry

## 2019-05-20 NOTE — TELEPHONE ENCOUNTER
Pt was dx'd with viral URI with cough and treated with  one dose of dexamethasone in patient, then prescribed oxymetazoline(Afrin) and Sudafed 12 hour. Returned call to pt - verified self and birth date. Pt states \"I don't know what it was, but the doctor gave me some steroids and it worked well. I'm wondering what I can do to get some more steroids because it seems to take the swelling down and the snot comes out better. \"  was questioned about additional sxs and states \"No, there is no other symptoms at all other than a little sinus pressure around my nose and this cough. \"    Patient was provided with the recommendations listed below to help with symptoms. If a fever (100.0+) develops over the next couple days or symptoms persist/worsen after 1 week, contact the office to schedule an appointment or be evaluated by your local urgent care facility. Pt verbalized understanding with no further questions at this time.       1. Saline Nasal Spray - use liberally to flush post-nasal area; use as many times a day as desired. Keep spraying with head tilted back until you feel need to swallow     2. Drink lots of fluids (mainly water) to keep the mucus thinner.     3. If needed, for cough, we recommend Delsym cough syrup.     4. Long acting antihistamine (Allegra/Fexofenadine or Zyrtec/Ceterizine) is useful if allergy symptoms are also present.     5. Decongestants should NOT be used as they actually contribute to overdrying/thickening of the mucus, and can raise the BP and overstimulate the heart.

## 2019-05-20 NOTE — TELEPHONE ENCOUNTER
Requested Prescriptions     Pending Prescriptions Disp Refills    sertraline (ZOLOFT) 50 mg tablet 30 Tab 1     Sig: Take 1 Tab by mouth daily. Refills must come from psych    dextroamphetamine-amphetamine (ADDERALL) 20 mg tablet 30 Tab 0     Sig: Take 1 Tab by mouth daily. Max Daily Amount: 20 mg. Refills must come from psychiatry     01/25/19 05/24/19    Patient states she is no longer seeing Dr. Emmett Short, she is currently looking for a new psych doctor outside of the [facility], she has a call in for a psych at [facility] and is awaiting a return call.

## 2019-05-22 ENCOUNTER — HOSPITAL ENCOUNTER (EMERGENCY)
Age: 64
Discharge: HOME | End: 2019-05-22
Attending: EMERGENCY MEDICINE | Admitting: EMERGENCY MEDICINE
Payer: MEDICARE

## 2019-05-22 ENCOUNTER — APPOINTMENT (OUTPATIENT)
Dept: GENERAL RADIOLOGY | Age: 64
End: 2019-05-22
Attending: EMERGENCY MEDICINE
Payer: MEDICARE

## 2019-05-22 VITALS
RESPIRATION RATE: 18 BRPM | TEMPERATURE: 97.3 F | HEART RATE: 90 BPM | DIASTOLIC BLOOD PRESSURE: 67 MMHG | OXYGEN SATURATION: 98 % | SYSTOLIC BLOOD PRESSURE: 101 MMHG

## 2019-05-22 DIAGNOSIS — E86.0 DEHYDRATION: ICD-10-CM

## 2019-05-22 DIAGNOSIS — J01.00 ACUTE NON-RECURRENT MAXILLARY SINUSITIS: Primary | ICD-10-CM

## 2019-05-22 LAB
ANION GAP SERPL CALC-SCNC: 4 MMOL/L (ref 5–15)
BASOPHILS # BLD: 0 K/UL (ref 0–0.1)
BASOPHILS NFR BLD: 0 % (ref 0–1)
BUN SERPL-MCNC: 19 MG/DL (ref 6–20)
BUN/CREAT SERPL: 20 (ref 12–20)
CALCIUM SERPL-MCNC: 9.1 MG/DL (ref 8.5–10.1)
CHLORIDE SERPL-SCNC: 102 MMOL/L (ref 97–108)
CO2 SERPL-SCNC: 29 MMOL/L (ref 21–32)
COMMENT, HOLDF: NORMAL
CREAT SERPL-MCNC: 0.94 MG/DL (ref 0.55–1.02)
DIFFERENTIAL METHOD BLD: ABNORMAL
EOSINOPHIL # BLD: 0 K/UL (ref 0–0.4)
EOSINOPHIL NFR BLD: 0 % (ref 0–7)
ERYTHROCYTE [DISTWIDTH] IN BLOOD BY AUTOMATED COUNT: 11.6 % (ref 11.5–14.5)
GLUCOSE SERPL-MCNC: 179 MG/DL (ref 65–100)
HCT VFR BLD AUTO: 41.3 % (ref 35–47)
HGB BLD-MCNC: 13.4 G/DL (ref 11.5–16)
IMM GRANULOCYTES # BLD AUTO: 0.3 K/UL (ref 0–0.04)
IMM GRANULOCYTES NFR BLD AUTO: 2 % (ref 0–0.5)
LYMPHOCYTES # BLD: 0.6 K/UL (ref 0.8–3.5)
LYMPHOCYTES NFR BLD: 5 % (ref 12–49)
MCH RBC QN AUTO: 32.4 PG (ref 26–34)
MCHC RBC AUTO-ENTMCNC: 32.4 G/DL (ref 30–36.5)
MCV RBC AUTO: 100 FL (ref 80–99)
MONOCYTES # BLD: 0.5 K/UL (ref 0–1)
MONOCYTES NFR BLD: 4 % (ref 5–13)
NEUTS SEG # BLD: 11.4 K/UL (ref 1.8–8)
NEUTS SEG NFR BLD: 89 % (ref 32–75)
NRBC # BLD: 0 K/UL (ref 0–0.01)
NRBC BLD-RTO: 0 PER 100 WBC
PLATELET # BLD AUTO: 356 K/UL (ref 150–400)
PMV BLD AUTO: 9 FL (ref 8.9–12.9)
POTASSIUM SERPL-SCNC: 4.8 MMOL/L (ref 3.5–5.1)
RBC # BLD AUTO: 4.13 M/UL (ref 3.8–5.2)
RBC MORPH BLD: ABNORMAL
SAMPLES BEING HELD,HOLD: NORMAL
SODIUM SERPL-SCNC: 135 MMOL/L (ref 136–145)
WBC # BLD AUTO: 12.8 K/UL (ref 3.6–11)

## 2019-05-22 PROCEDURE — 96360 HYDRATION IV INFUSION INIT: CPT

## 2019-05-22 PROCEDURE — 80048 BASIC METABOLIC PNL TOTAL CA: CPT

## 2019-05-22 PROCEDURE — 36415 COLL VENOUS BLD VENIPUNCTURE: CPT

## 2019-05-22 PROCEDURE — 99282 EMERGENCY DEPT VISIT SF MDM: CPT

## 2019-05-22 PROCEDURE — 85025 COMPLETE CBC W/AUTO DIFF WBC: CPT

## 2019-05-22 PROCEDURE — 74011250636 HC RX REV CODE- 250/636: Performed by: NURSE PRACTITIONER

## 2019-05-22 PROCEDURE — 71046 X-RAY EXAM CHEST 2 VIEWS: CPT

## 2019-05-22 RX ORDER — AMOXICILLIN AND CLAVULANATE POTASSIUM 875; 125 MG/1; MG/1
1 TABLET, FILM COATED ORAL 2 TIMES DAILY
Qty: 14 TAB | Refills: 0 | Status: SHIPPED | OUTPATIENT
Start: 2019-05-22 | End: 2019-05-22

## 2019-05-22 RX ORDER — AMOXICILLIN AND CLAVULANATE POTASSIUM 875; 125 MG/1; MG/1
1 TABLET, FILM COATED ORAL 2 TIMES DAILY
Qty: 14 TAB | Refills: 0 | Status: SHIPPED | OUTPATIENT
Start: 2019-05-22 | End: 2019-05-29

## 2019-05-22 RX ADMIN — SODIUM CHLORIDE 1000 ML: 900 INJECTION, SOLUTION INTRAVENOUS at 17:11

## 2019-05-22 NOTE — ED TRIAGE NOTES
Pt states that she was told to come to the ED for an URI she has not been able to get under control for the past 3 weeks.

## 2019-05-22 NOTE — ED PROVIDER NOTES
Initial Complaint: Cough     Started: 3 weeks today    Saw PCP & at [facility]. Dexamethasone helped for a few days. Has been using the coricidin HBP. Sudafed & Afrin for 3 days. Using conner pot with removal of green mucous. naproxen for body aches & fever    Endorses: sore throat, cough, tenderness over cheeks and jaw bone. Hurts to wear teeth. Possible drainage from left ear. Did puncture the area with straight pin cleaned with alcohol. Low grade fevers and chills. Denies: N/V    Made better: hot rag to the face  Made worse: cooled air    No further complaints. Past Medical History:  No date: ADD (attention deficit disorder)  No date: Arthritis  2009: AVM (arteriovenous malformation) brain  2009: CVA (cerebral infarction)  No date: Hypertension  No date: Inattention  Past Surgical History:  No date: HX HEENT  No date: HX HYSTERECTOMY  No date: HX ORTHOPAEDIC  No date: NEUROLOGICAL PROCEDURE UNLISTED      Comment:  anurism surgery, AVM malformatiom  Reviewed      Primary care provider: González Wheeler NP      The history is provided by the patient. No  was used.       Past Medical History:   Diagnosis Date    ADD (attention deficit disorder)     Arthritis     AVM (arteriovenous malformation) brain 2009    CVA (cerebral infarction) 2009    Hypertension     Inattention      Past Surgical History:   Procedure Laterality Date    HX HEENT      HX HYSTERECTOMY      HX ORTHOPAEDIC      NEUROLOGICAL PROCEDURE UNLISTED      anurism surgery, AVM malformatiom     Family History:   Problem Relation Age of Onset    Hypertension Mother     Elevated Lipids Mother     Diabetes Father     Heart Attack Maternal Grandmother     Diabetes Paternal Grandmother     Diabetes Paternal Grandfather      Social History     Socioeconomic History    Marital status:      Spouse name: Not on file    Number of children: Not on file    Years of education: Not on file    Highest education level: Not on file   Occupational History    Not on file   Social Needs    Financial resource strain: Not on file    Food insecurity:     Worry: Not on file     Inability: Not on file    Transportation needs:     Medical: Not on file     Non-medical: Not on file   Tobacco Use    Smoking status: Current Every Day Smoker     Packs/day: 0.50     Years: 16.00     Pack years: 8.00    Smokeless tobacco: Never Used   Substance and Sexual Activity    Alcohol use: Yes     Alcohol/week: 1.2 oz     Types: 1 Glasses of wine, 1 Cans of beer per week    Drug use: No    Sexual activity: Not Currently   Lifestyle    Physical activity:     Days per week: Not on file     Minutes per session: Not on file    Stress: Not on file   Relationships    Social connections:     Talks on phone: Not on file     Gets together: Not on file     Attends Gnosticism service: Not on file     Active member of club or organization: Not on file     Attends meetings of clubs or organizations: Not on file     Relationship status: Not on file    Intimate partner violence:     Fear of current or ex partner: Not on file     Emotionally abused: Not on file     Physically abused: Not on file     Forced sexual activity: Not on file   Other Topics Concern    Not on file   Social History Narrative    , her ex  has passed away, one son in [place], two daughters- one in [place] and one in [place]. ALLERGIES: Patient has no known allergies. Review of Systems   Constitutional: Positive for activity change, appetite change and fever (low-grade not >100). Negative for chills. HENT: Positive for congestion, ear discharge, ear pain, sinus pressure, sinus pain, sore throat and voice change. Respiratory: Positive for cough. Cardiovascular: Negative for chest pain. Gastrointestinal: Negative for constipation, diarrhea, nausea and vomiting. Genitourinary: Negative for difficulty urinating.    Musculoskeletal: Positive for back pain (thoracic). All other systems reviewed and are negative. Vitals:    05/22/19 1609 05/22/19 1625 05/22/19 1627   BP:   101/67   Pulse: (!) 111  (!) 103   Resp:   18   Temp:   97.3 °F (36.3 °C)   SpO2: 96% 98% 98%          Physical Exam   Constitutional: She is oriented to person, place, and time. She appears well-developed and well-nourished. HENT:   Head: Normocephalic and atraumatic. Right Ear: Hearing, tympanic membrane, external ear and ear canal normal.   Left Ear: Hearing, tympanic membrane and ear canal normal.   Ears:    Nose: Nose normal.   Mouth/Throat: Uvula is midline. Mucous membranes are pale, dry and not cyanotic. No oral lesions. Posterior oropharyngeal erythema present. Tonsils are 0 on the right. Tonsils are 0 on the left. Erythematous area to the outer lower auricle. Neck: Normal range of motion. Neck supple. Cardiovascular: Regular rhythm, normal heart sounds and intact distal pulses. Tachycardia present. Pulmonary/Chest: Effort normal and breath sounds normal. No respiratory distress. She has no wheezes. She has no rales. She exhibits no tenderness. Abdominal: Soft. Bowel sounds are normal. There is no tenderness. There is no guarding. Musculoskeletal: Normal range of motion. Lymphadenopathy:        Head (right side): Tonsillar adenopathy present. No submental, no submandibular, no preauricular and no posterior auricular adenopathy present. Head (left side): Tonsillar adenopathy present. No submental, no submandibular, no preauricular and no posterior auricular adenopathy present. She has no cervical adenopathy. Neurological: She is alert and oriented to person, place, and time. Skin: Skin is warm and dry. No erythema. Psychiatric: She has a normal mood and affect. Her behavior is normal. Judgment and thought content normal.   Nursing note and vitals reviewed.      MDM       Procedures      Assessment & Plan:     Orders Placed This Encounter    XR CHEST PA LAT    CBC WITH AUTOMATED DIFF    METABOLIC PANEL, BASIC    sodium chloride 0.9 % bolus infusion 1,000 mL     Extremely dry mucous membranes. Discussed with Radha Moore MD,ED Provider    Bridget Bai NP  05/22/19  4:38 PM      Labs and imaging without acute findings. Follow-up with PCP. Discussed return precautions. LABORATORY TESTS:  Labs Reviewed   CBC WITH AUTOMATED DIFF - Abnormal; Notable for the following components:       Result Value    WBC 12.8 (*)     .0 (*)     NEUTROPHILS 89 (*)     LYMPHOCYTES 5 (*)     MONOCYTES 4 (*)     IMMATURE GRANULOCYTES 2 (*)     ABS. NEUTROPHILS 11.4 (*)     ABS. LYMPHOCYTES 0.6 (*)     ABS. IMM. GRANS. 0.3 (*)     All other components within normal limits   METABOLIC PANEL, BASIC - Abnormal; Notable for the following components:    Sodium 135 (*)     Anion gap 4 (*)     Glucose 179 (*)     All other components within normal limits   SAMPLES BEING HELD   SAMPLES BEING HELD       IMAGING RESULTS:  Xr Chest Pa Lat    Result Date: 5/22/2019  EXAM:  XR CHEST PA LAT INDICATION: Cough. COMPARISON: 10/2/2011 TECHNIQUE: PA and lateral chest views FINDINGS:  The cardiomediastinal contours are stable. The pulmonary vasculature is within normal limits. The lungs and pleural spaces are clear. Bilateral nipple shadows are noted. There is no pneumothorax. The bones and upper abdomen are stable. No acute process. Stable exam.     MEDICATIONS GIVEN:  Medications   sodium chloride 0.9 % bolus infusion 1,000 mL (0 mL IntraVENous IV Completed 5/22/19 1800)       IMPRESSION:  1. Acute non-recurrent maxillary sinusitis    2. Dehydration        PLAN:  1. Discharge Medication List as of 5/22/2019  5:47 PM      START taking these medications    Details   amoxicillin-clavulanate (AUGMENTIN) 875-125 mg per tablet Take 1 Tab by mouth two (2) times a day for 7 days. , Normal, Disp-14 Tab, R-0         CONTINUE these medications which have NOT CHANGED    Details   sertraline (ZOLOFT) 50 mg tablet Take 1 Tab by mouth daily. Refills must come from psych, Normal, Disp-30 Tab, R-1      dextroamphetamine-amphetamine (ADDERALL) 20 mg tablet Take 1 Tab by mouth daily. Max Daily Amount: 20 mg. Refills must come from psychiatry, Print, Disp-30 Tab, R-0      predniSONE (DELTASONE) 5 mg tablet Take 1 Tab by mouth two (2) times a day., Normal, Disp-60 Tab, R-0      lisinopril (PRINIVIL, ZESTRIL) 10 mg tablet Take 1 Tab by mouth daily. , Normal, Disp-90 Tab, R-0      hydroxychloroquine (PLAQUENIL) 200 mg tablet Take 1.5 Tabs by mouth daily. , Normal, Disp-45 Tab, R-6      triamterene-hydroCHLOROthiazide (DYAZIDE) 37.5-25 mg per capsule TAKE ONE CAPSULE BY MOUTH DAILY FOR HYPERTENSION  Indications: hypertension, Normal, Disp-90 Cap, R-1      multivitamin (ONE A DAY) tablet Take 1 Tab by mouth daily. For women over 48, Historical Med         STOP taking these medications       pseudoephedrine CR (SUDAFED 12 HOUR) 120 mg CR tablet Comments:   Reason for Stopping:         oxymetazoline (AFRIN, OXYMETAZOLINE,) 0.05 % nasal spray Comments:   Reason for Stoppin.   Follow-up Information     Follow up With Specialties Details Why Contact Info    Michael Jonas NP Nurse Practitioner Schedule an appointment as soon as possible for a visit  [address] [place] [address] [address] Adina [address] [place] [id number]  [phone]      [address] [facility] Emergency Medicine  As needed, If symptoms worsen [address] [facility]  [phone]        3.      Return to ED for new or worsening symptoms       Oniel Leon NP

## 2019-05-22 NOTE — ED TRIAGE NOTES
Patient presents from home with complaints of cough for 3 weeks. Patient was seen by her PCP for this issue and was told to come to the ED because it hasn't gotten better.   Patient was also seen at [facility] last week for this issue

## 2019-05-22 NOTE — DISCHARGE INSTRUCTIONS
Thank you for allowing us to care for you today. Please follow-up with your Primary Care provider in the next 2-3 days if your symptoms do not improve. Plan for home:     Augmentin twice daily for 7 days for sinusitis. Hot compresses/heating pad to the face. Twice daily conner pot sinus rinses  WITH DISTILLED WATER ONLY. Follow the rinse with Rhinocort nasal spray. Either: 1 spray twice daily to each nostril or 2 sprays once daily  to each nostril. It will take several days for this to be effective. Avoid using Afrin. If you do use Afrin use no longer than 3 days to prevent rebound congestion. Salt water gargles for sore throat 2-3 times daily. Follow-up with primary care as needed. Patient Education      Oral Rehydration: Care Instructions  Your Care Instructions    Dehydration occurs when your body loses too much water. This can happen if you do not drink enough fluids or lose a lot of fluid due to diarrhea, vomiting, or sweating. Being dehydrated can cause health problems and can even be life-threatening. To replace lost fluids, you need to drink liquid that contains special chemicals called electrolytes. Electrolytes keep your body working well. Plain water does not have electrolytes. You also need to rest to prevent more fluid loss. Replacing water and electrolytes (oral rehydration) completely takes about 36 hours. But you should feel better within a few hours. Follow-up care is a key part of your treatment and safety. Be sure to make and go to all appointments, and call your doctor if you are having problems. It's also a good idea to know your test results and keep a list of the medicines you take. How can you care for yourself at home? · Take frequent sips of a drink such as Gatorade, Powerade, or other rehydration drinks that your doctor suggests. These replace both fluid and important chemicals (electrolytes) you need for balance in your blood.   · Drink 2 quarts of cool liquid over 2 to 4 hours. You should have at least 10 glasses of liquid a day to replace lost fluid. If you have kidney, heart, or liver disease and have to limit fluids, talk with your doctor before you increase the amount of fluids you drink. · Make your own drink. Measure everything carefully. The drink may not work well or may even be harmful if the amounts are off. ? 1 quart water  ? ½ teaspoon salt  ? 6 teaspoons sugar  · Do not drink liquid with caffeine, such as coffee and rashida. · Do not drink any alcohol. It can make you dehydrated. · Drink plenty of fluids, enough so that your urine is light yellow or clear like water. If you have kidney, heart, or liver disease and have to limit fluids, talk with your doctor before you increase the amount of fluids you drink. When should you call for help? Call 911 anytime you think you may need emergency care. For example, call if:    · You have signs of severe dehydration, such as:  ? You are confused or unable to stay awake.  ? You passed out (lost consciousness).    Call your doctor now or seek immediate medical care if:    · You still have signs of dehydration. You have sunken eyes and a dry mouth, and you pass only a little dark urine.     · You are dizzy or lightheaded, or you feel like you may faint.     · You are not able to keep down fluids.    Watch closely for changes in your health, and be sure to contact your doctor if:    · You do not get better as expected. Where can you learn more? Go to http://farooq-heather.info/. Enter I040 in the search box to learn more about \"Oral Rehydration: Care Instructions. \"  Current as of: September 23, 2018  Content Version: 11.9  © 1888-0374 [employer] Care instructions adapted under license by [employer] (which disclaims liability or warranty for this information).  If you have questions about a medical condition or this instruction, always ask your healthcare professional. [address] any warranty or liability for your use of this information.

## 2019-05-31 ENCOUNTER — OFFICE VISIT (OUTPATIENT)
Dept: RHEUMATOLOGY | Age: 64
End: 2019-05-31

## 2019-05-31 VITALS
TEMPERATURE: 98.3 F | RESPIRATION RATE: 16 BRPM | OXYGEN SATURATION: 97 % | DIASTOLIC BLOOD PRESSURE: 68 MMHG | WEIGHT: 108.8 LBS | BODY MASS INDEX: 17.56 KG/M2 | SYSTOLIC BLOOD PRESSURE: 111 MMHG | HEART RATE: 83 BPM

## 2019-05-31 DIAGNOSIS — M19.90 INFLAMMATORY ARTHRITIS: ICD-10-CM

## 2019-05-31 DIAGNOSIS — M70.60 TROCHANTERIC BURSITIS, UNSPECIFIED LATERALITY: ICD-10-CM

## 2019-05-31 DIAGNOSIS — M05.79 SEROPOSITIVE RHEUMATOID ARTHRITIS OF MULTIPLE SITES (HCC): Primary | ICD-10-CM

## 2019-05-31 RX ORDER — NAPROXEN 250 MG/1
TABLET ORAL 2 TIMES DAILY WITH MEALS
COMMUNITY
End: 2021-12-27

## 2019-05-31 NOTE — PROGRESS NOTES
Chief Complaint   Patient presents with    Joint Pain     1. Have you been to the ER, urgent care clinic since your last visit? Hospitalized since your last visit? Yes [facility]    2. Have you seen or consulted any other health care providers outside of the [address] [place] [address] since your last visit? Include any pap smears or colon screening.  No

## 2019-05-31 NOTE — PROGRESS NOTES
RHEUMATOLOGY PROBLEM LIST AND CHIEF COMPLAINT  1. Inflammatory arthritis - Joint space narrowing of 2nd and 3rd MCPs, morning stiffness, improvement of joint pain throughout the day, one episode of arthritis in right wrist that responded to prednisone, JOSELITO positive, CCP positive, CRP elevated   2. Osteoarthritis     Therapy history:   Current DMARDs: Plaquenil (11/2018-current)    INTERVAL HISTORY  This is a 66 y.o.  female. Today, the patient complains of pain in the joints. Location: hip  Severity:  7 on a scale of 0-10  Timing: all day   Duration:  4 months  Modifying Factors: Abnormal gait secondary to hip bursitis (improved)  Context/Associated signs and symptoms: Pt. states that her R hand arthritis has remained stable since completing the oral prednisone taper within the past few weeks. She continues plaquenil 200 mg daily as directed. C/o persistent left knee pain secondary to right trochanteric bursitis that did not respond to the prior bursa injection I provided last visit.     RHEUMATOLOGY REVIEW OF SYSTEMS   Positives as per history  Negatives as follows:  Doris Rosa:  Denies unexplained persistent fevers, weight change, chronic fatigue  HEAD/EYES:   Denies eye redness, blurry vision or sudden loss of vision, dry eyes, HA, temporal artery pain  ENT:    Denies oral/nasal ulcers, recurrent sinus infections, dry mouth  RESPIRATORY:  No pleuritic pain, history of pleural effusions, hemoptysis, exertional dyspnea  CARDIOVASCULAR:  Denies chest pain, history of pericardial effusions  GASTRO:   Denies heartburn, esophageal dysmotility, abdominal pain, nausea, vomiting, diarrhea, blood in the stool  HEMATOLOGIC:  No easy bruising, purpura, swollen lymph nodes  SKIN:    Denies alopecia, ulcers, nodules, sun sensitivity, unexplained persistent rash   VASCULAR:   Denies edema, cyanosis, raynaud phenomenon  NEUROLOGIC:  Denies specific muscle weakness, paresthesias   PSYCHIATRIC:  No sleep disturbance / snoring, depression, anxiety  MSK:    No morning stiffness >1 hour, SI joint pain    PAST MEDICAL HISTORY  Reviewed with patient, significant changes in medical history - no    PHYSICAL EXAM  Blood pressure 111/68, pulse 83, temperature 98.3 °F (36.8 °C), temperature source Oral, resp. rate 16, weight 108 lb 12.8 oz (49.4 kg), SpO2 97 %. GENERAL APPEARANCE: Well-nourished, no acute distress  NECK: No adenopathy  ENT: No oral ulcers  CARDIOVASCULAR: Heart rhythm is regular. No murmur, rub, gallop  CHEST: Normal vesicular breath sounds. No wheezes, rales, pleural friction rubs  ABDOMINAL: The abdomen is soft and nontender. Bowel sounds are normal  SKIN: No rash, palpable purpura, digital ulcer, abnormal thickening   MUSCULOSKELETAL:   Upper extremities - Heberden's and Sebastian's nodes. B/l hand synovial thickening over MCP joints (R>L), no synovitis. Lower extremities - R lateral thigh tenderness secondary to trochanteric bursitis - unchanged. LABS, RADIOLOGY AND PROCEDURES   Previous labs reviewed -Yes    ASSESSMENT  1. Inflammatory arthritis (Established problem -  Stable disease) - Pt's disease is currently stable. She should continue with Plaquenil 200 mg daily. She should return in 3 months for a follow up. 2. Osteoarthritis - ROM exercises are recommended for now. 3. Trochanteric bursitis, right - Defers bursal injection in preference for therapeutic exercises. PLAN  1. Continue Plaquenil 200 mg daily  2. Exercises for right trochanteric bursitis   3. Return in 4 months    Avis Duval MD  Adult and Pediatric Rheumatology     [address] [place] [address], Phone [phone], Fax [phone]   E-mail: [email or web address]    Visiting  of Pediatrics    Department of Pediatrics, [address], Phone [phone], Fax [phone]  E-mail: [email or web address]    There are no Patient Instructions on file for this visit. cc:  Mayelin Garcia NP    Written by william Kerr, as dictated by Lucian Deluna.  Gurjit Jin M.D.

## 2019-06-12 DIAGNOSIS — F41.8 DEPRESSION WITH ANXIETY: ICD-10-CM

## 2019-06-12 RX ORDER — SERTRALINE HYDROCHLORIDE 50 MG/1
50 TABLET, FILM COATED ORAL DAILY
Qty: 30 TAB | Refills: 0 | Status: SHIPPED | OUTPATIENT
Start: 2019-06-12 | End: 2020-01-09 | Stop reason: SDUPTHER

## 2019-06-12 NOTE — TELEPHONE ENCOUNTER
Requested Prescriptions     Pending Prescriptions Disp Refills    sertraline (ZOLOFT) 50 mg tablet 30 Tab 1     Sig: Take 1 Tab by mouth daily. Refills must come from psych     01/25/19  10/14/19    Patient states she tried calling Dr. [place] Santiago office for an appt multiple times and would only get a voicemail, she left several messages requesting a call back, to schedule an appt and never received a return phone call, so she contacted [facility] and got an appt with a psychiatrist next month.  She does not currently have the name of the psychiatrist she will be seeing, all she remembers is that the appt is located at their satellite office of  [address] [place] [address]

## 2019-06-27 DIAGNOSIS — I10 BENIGN ESSENTIAL HYPERTENSION: ICD-10-CM

## 2019-06-27 RX ORDER — LISINOPRIL 10 MG/1
TABLET ORAL
Qty: 90 TAB | Refills: 0 | Status: SHIPPED | OUTPATIENT
Start: 2019-06-27 | End: 2019-12-26 | Stop reason: SDUPTHER

## 2019-06-28 DIAGNOSIS — I10 BENIGN ESSENTIAL HYPERTENSION: ICD-10-CM

## 2019-06-28 RX ORDER — TRIAMTERENE AND HYDROCHLOROTHIAZIDE 37.5; 25 MG/1; MG/1
CAPSULE ORAL
Qty: 90 CAP | Refills: 1 | Status: SHIPPED | OUTPATIENT
Start: 2019-06-28

## 2019-09-08 ENCOUNTER — APPOINTMENT (OUTPATIENT)
Dept: GENERAL RADIOLOGY | Age: 64
End: 2019-09-08
Attending: EMERGENCY MEDICINE
Payer: MEDICARE

## 2019-09-08 ENCOUNTER — HOSPITAL ENCOUNTER (EMERGENCY)
Age: 64
Discharge: HOME | End: 2019-09-08
Attending: EMERGENCY MEDICINE | Admitting: EMERGENCY MEDICINE
Payer: MEDICARE

## 2019-09-08 VITALS
RESPIRATION RATE: 17 BRPM | DIASTOLIC BLOOD PRESSURE: 96 MMHG | TEMPERATURE: 98.5 F | SYSTOLIC BLOOD PRESSURE: 182 MMHG | HEART RATE: 93 BPM | OXYGEN SATURATION: 99 %
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
## 2019-09-08 DIAGNOSIS — S42.225A CLOSED 2-PART NONDISPLACED FRACTURE OF SURGICAL NECK OF LEFT HUMERUS, INITIAL ENCOUNTER: Primary | ICD-10-CM

## 2019-09-08 PROCEDURE — 99283 EMERGENCY DEPT VISIT LOW MDM: CPT

## 2019-09-08 PROCEDURE — 73030 X-RAY EXAM OF SHOULDER: CPT

## 2019-09-08 PROCEDURE — 73080 X-RAY EXAM OF ELBOW: CPT

## 2019-09-08 PROCEDURE — 74011250637 HC RX REV CODE- 250/637: Performed by: EMERGENCY MEDICINE

## 2019-09-08 RX ORDER — HYDROCODONE BITARTRATE AND ACETAMINOPHEN 5; 325 MG/1; MG/1
1 TABLET ORAL ONCE
Status: COMPLETED | OUTPATIENT
Start: 2019-09-08 | End: 2019-09-08

## 2019-09-08 RX ORDER — HYDROCODONE BITARTRATE AND ACETAMINOPHEN 5; 325 MG/1; MG/1
1 TABLET ORAL
Qty: 15 TAB | Refills: 0 | Status: SHIPPED | OUTPATIENT
Start: 2019-09-08 | End: 2019-09-13

## 2019-09-08 RX ADMIN — HYDROCODONE BITARTRATE AND ACETAMINOPHEN 1 TABLET: 5; 325 TABLET ORAL at 13:11

## 2019-09-08 NOTE — ED PROVIDER NOTES
60 y.o. female with past medical history significant for HTN, AVM, CVA, ADD, arthritis who presents from home via EMS with chief complaint of evaluation post ground level fall. Pt reports that she slipped and fell in the bathroom at 0000 this morning and hit her L shoulder on the door jam. Pt was able to get up and walk herself back to bed, and went back to sleep. Pt now c/o L shoulder pain. Pain 10/10, worse with movement/palpation. Better with rest. She has not taken anything for it yet. Pt is R hand dominant. Pt denies hitting her head or LOC. Denies dizziness or lightheadedness prior to falling. Denies anticoagulation. Denies any other pain. There are no other acute medical concerns at this time. Social hx: +tobacco smoker (0.5 packs/day), +EtOH consumption     PCP: Cally Ramos NP    Note written by Kanchan Gamez, as dictated by Lalita Nair,  11:34 AM      The history is provided by the patient. No  was used.         Past Medical History:   Diagnosis Date    ADD (attention deficit disorder)     Arthritis     AVM (arteriovenous malformation) brain 2009    CVA (cerebral infarction) 2009    Hypertension     Inattention        Past Surgical History:   Procedure Laterality Date    HX HEENT      HX HYSTERECTOMY      HX ORTHOPAEDIC      NEUROLOGICAL PROCEDURE UNLISTED      anurism surgery, AVM malformatiom         Family History:   Problem Relation Age of Onset    Hypertension Mother     Elevated Lipids Mother     Diabetes Father     Heart Attack Maternal Grandmother     Diabetes Paternal Grandmother     Diabetes Paternal Grandfather        Social History     Socioeconomic History    Marital status:      Spouse name: Not on file    Number of children: Not on file    Years of education: Not on file    Highest education level: Not on file   Occupational History    Not on file   Social Needs    Financial resource strain: Not on file   Virtual 3-D Display for Smartphones-[employer]arsha insecurity:     Worry: Not on file     Inability: Not on file    Transportation needs:     Medical: Not on file     Non-medical: Not on file   Tobacco Use    Smoking status: Current Every Day Smoker     Packs/day: 0.50     Years: 16.00     Pack years: 8.00    Smokeless tobacco: Never Used   Substance and Sexual Activity    Alcohol use: Yes     Alcohol/week: 2.0 standard drinks     Types: 1 Glasses of wine, 1 Cans of beer per week    Drug use: No    Sexual activity: Not Currently   Lifestyle    Physical activity:     Days per week: Not on file     Minutes per session: Not on file    Stress: Not on file   Relationships    Social connections:     Talks on phone: Not on file     Gets together: Not on file     Attends Islam service: Not on file     Active member of club or organization: Not on file     Attends meetings of clubs or organizations: Not on file     Relationship status: Not on file    Intimate partner violence:     Fear of current or ex partner: Not on file     Emotionally abused: Not on file     Physically abused: Not on file     Forced sexual activity: Not on file   Other Topics Concern    Not on file   Social History Narrative    , her ex  has passed away, one son in [place], two daughters- one in [place] and one in [place]. ALLERGIES: Patient has no known allergies. Review of Systems   Constitutional: Negative for fever. HENT: Negative for hearing loss. Eyes: Negative for visual disturbance. Respiratory: Negative for shortness of breath. Cardiovascular: Negative for chest pain. Gastrointestinal: Negative for abdominal pain. Genitourinary: Negative for flank pain. Musculoskeletal: Positive for arthralgias. Negative for back pain. Skin: Negative for rash. Neurological: Negative for dizziness, light-headedness and numbness. All other systems reviewed and are negative. There were no vitals filed for this visit.          Physical Exam Constitutional: She is oriented to person, place, and time. She appears well-developed and well-nourished. No distress. HENT:   Head: Normocephalic and atraumatic. Mouth/Throat: Oropharynx is clear and moist.   Eyes: Pupils are equal, round, and reactive to light. Conjunctivae and EOM are normal. No scleral icterus. Neck: Neck supple. No JVD present. No tracheal deviation present. Cardiovascular: Normal rate and regular rhythm. Exam reveals no gallop. No murmur heard. Pulmonary/Chest: Effort normal and breath sounds normal. No respiratory distress. Abdominal: Soft. Bowel sounds are normal. She exhibits no distension. There is no tenderness. Musculoskeletal: She exhibits no edema. Left shoulder: She exhibits decreased range of motion, tenderness (anterior), pain and decreased strength. She exhibits no swelling, no effusion, no crepitus and no deformity. Neurological: She is alert and oriented to person, place, and time. Skin: Skin is warm and dry. Capillary refill takes less than 2 seconds. No rash noted. Psychiatric: She has a normal mood and affect. Her behavior is normal. Judgment and thought content normal.    Note written by Kanchan Cortez, as dictated by Juan Morrow DO 11:38 AM        MDM  Number of Diagnoses or Management Options  Closed 2-part nondisplaced fracture of surgical neck of left humerus, initial encounter:   Fall, initial encounter:      Ms. Hector Garcia comes as above. History and physical exam and imaging or as above. This time patient has a nondisplaced left humeral neck fracture. She has an otherwise unremarkable exam and no signs of neurovascular loss. At this time, I will place the patient in a splint, provide her with analgesia, and have her followup with the orthopedic surgeon as an outpatient. This is her nondominant hand it filled she is appropriate for this disposition. Patient and friend at bedside are agreeable to this plan.  Questions answered. Return and follow up instructions provided. Pain medications provided in the [facility] with some relief.      Procedures

## 2019-09-08 NOTE — ED TRIAGE NOTES
Pt arrives via EMS from home for c/o unwitnessed GLF approximately around midnight. Left shoulder hit door frame on the way to bathroom. Pt able to get off floor after fall and make it to couch where she \"hoped to sleep it off. \" Denies hitting head, LOC. Pt has \"balance issues due to missing a piece of my brain so I just lost my balance on the way to the bathroom. \"

## 2019-09-30 ENCOUNTER — OFFICE VISIT (OUTPATIENT)
Dept: RHEUMATOLOGY | Age: 64
End: 2019-09-30

## 2019-09-30 VITALS
HEART RATE: 92 BPM | WEIGHT: 106.2 LBS | OXYGEN SATURATION: 96 % | RESPIRATION RATE: 16 BRPM | TEMPERATURE: 98.2 F | BODY MASS INDEX: 17.14 KG/M2 | DIASTOLIC BLOOD PRESSURE: 89 MMHG | SYSTOLIC BLOOD PRESSURE: 148 MMHG

## 2019-09-30 DIAGNOSIS — M19.90 INFLAMMATORY ARTHRITIS: ICD-10-CM

## 2019-09-30 DIAGNOSIS — N95.9 POST MENOPAUSAL PROBLEMS: Primary | ICD-10-CM

## 2019-09-30 DIAGNOSIS — M05.79 SEROPOSITIVE RHEUMATOID ARTHRITIS OF MULTIPLE SITES (HCC): ICD-10-CM

## 2019-09-30 RX ORDER — ERGOCALCIFEROL 1.25 MG/1
50000 CAPSULE ORAL
COMMUNITY
End: 2022-02-03

## 2019-09-30 RX ORDER — ALENDRONATE SODIUM 70 MG/1
70 TABLET ORAL
Qty: 12 TAB | Refills: 4 | Status: SHIPPED | OUTPATIENT
Start: 2019-09-30 | End: 2021-04-08

## 2019-09-30 NOTE — PROGRESS NOTES
RHEUMATOLOGY PROBLEM LIST AND CHIEF COMPLAINT  1. Inflammatory arthritis - Joint space narrowing of 2nd and 3rd MCPs, morning stiffness, improvement of joint pain throughout the day, one episode of arthritis in right wrist that responded to prednisone, JOSELITO positive, CCP positive, CRP elevated   2. Osteoarthritis     Therapy history:   Current DMARDs: Plaquenil (11/2018-current)    INTERVAL HISTORY  This is a 60 y.o.  female. Today, the patient complains of no pain in the joints. Location: None  Severity:  0 on a scale of 0-10  Timing: None at this time  Duration:  4 months  Modifying Factors: NA  Context/Associated signs and symptoms: The patient is doing well regarding her inflammatory arthritis. She complains of pain in the left digits. She denies morning stiffness. She continues with Plaquenil 200 mg daily as directed. The patient's left arm is in a sling; she has an acute non-displayed humeral fracture. We reviewed her bone density scan in 2014 which showed osteopenia.  She currently takes calcium and vitamin D.     RHEUMATOLOGY REVIEW OF SYSTEMS   Positives as per history  Negatives as follows:  CONSTITUTlONAL:  Denies unexplained persistent fevers, weight change, chronic fatigue  HEAD/EYES:   Denies eye redness, blurry vision or sudden loss of vision, dry eyes, HA, temporal artery pain  ENT:    Denies oral/nasal ulcers, recurrent sinus infections, dry mouth  RESPIRATORY:  No pleuritic pain, history of pleural effusions, hemoptysis, exertional dyspnea  CARDIOVASCULAR:  Denies chest pain, history of pericardial effusions  GASTRO:   Denies heartburn, esophageal dysmotility, abdominal pain, nausea, vomiting, diarrhea, blood in the stool  HEMATOLOGIC:  No easy bruising, purpura, swollen lymph nodes  SKIN:    Denies alopecia, ulcers, nodules, sun sensitivity, unexplained persistent rash   VASCULAR:   Denies edema, cyanosis, raynaud phenomenon  NEUROLOGIC:  Denies specific muscle weakness, paresthesias PSYCHIATRIC:  No sleep disturbance / snoring, depression, anxiety  MSK:    No morning stiffness >1 hour, SI joint pain    PAST MEDICAL HISTORY  Reviewed with patient, significant changes in medical history - no    PHYSICAL EXAM  Blood pressure 148/89, pulse 92, temperature 98.2 °F (36.8 °C), temperature source Oral, resp. rate 16, weight 106 lb 3.2 oz (48.2 kg), SpO2 96 %. GENERAL APPEARANCE: Well-nourished, no acute distress Left arm in sling  NECK: No adenopathy  ENT: No oral ulcers  CARDIOVASCULAR: Heart rhythm is regular. No murmur, rub, gallop  CHEST: Normal vesicular breath sounds. No wheezes, rales, pleural friction rubs  ABDOMINAL: The abdomen is soft and nontender. Bowel sounds are normal  SKIN: No rash, palpable purpura, digital ulcer, abnormal thickening   MUSCULOSKELETAL:   Upper extremities - Heberden's and Sebastian's nodes. B/l hand synovial thickening over MCP joints (R>L), no synovitis. Lower extremities - R lateral thigh tenderness secondary to trochanteric bursitis - unchanged. LABS, RADIOLOGY AND PROCEDURES   Previous labs reviewed -Yes    ASSESSMENT  1. Inflammatory arthritis (Established problem -  Stable disease) - The patient's disease continues to be well managed with Plaquenil 200 mg daily. She should continue with this medication. Her complaints of pain in the left digits are likely not inflammatory, there is no synovitis on exam. We will continue to monitor these symptoms. She should return in 3 months for a follow up. 2. Osteoarthritis - ROM exercises are recommended. She did not complain of this issue today. 3. Trochanteric bursitis, right - We did not discuss this issue at length today. She should continue with therapeutic exercises as needed. 4. Bone density-The patient had a recent fragility fracture of the left humerus. She has not had a recent DEXA scan, she continues on calcium and vitamin D. Her last DEXA scan from 2014 showed osteopenia.  I recommend she begin Fosamax in order to improve her bone density. I will also order a DEXA scan to further evaluate. If she is unable to tolerate the Fosamax we may consider escalating treatment to Prolia. We discussed additional treatments such as Tymlos and Forteo, but we will not consider these at this time. PLAN  1. Continue Plaquenil 200 mg daily  2. DEXA scan  3. Start Fosamax 70 mg daily  4. Return in 4 months    Avis Thibodeaux MD  Adult and Pediatric Rheumatology     [address] [place] [address], Phone [phone], Fax [phone]   E-mail: [email or web address]    Visiting  of Pediatrics    Department of Pediatrics, [facility] of [address] [place] [address], [address] [place] [address], Phone [phone], Fax [phone]  E-mail: [email or web address]    There are no Patient Instructions on file for this visit. cc:  Martha Pena NP    Written by william Mercado, as dictated by Nica Myers.  Whitney Thibodeaux M.D.

## 2019-09-30 NOTE — PROGRESS NOTES
Chief Complaint   Patient presents with    Joint Pain     1. Have you been to the ER, urgent care clinic since your last visit? Hospitalized since your last visit? Yes for fracture to left shoulder     2. Have you seen or consulted any other health care providers outside of the [address] [place] [address] since your last visit?   Include any pap smears or colon screening No

## 2019-10-15 ENCOUNTER — OFFICE VISIT (OUTPATIENT)
Dept: INTERNAL MEDICINE CLINIC | Age: 64
End: 2019-10-15

## 2019-10-15 ENCOUNTER — HOSPITAL ENCOUNTER (OUTPATIENT)
Dept: BONE DENSITY | Age: 64
Discharge: HOME | End: 2019-10-15
Attending: PEDIATRICS
Payer: MEDICARE

## 2019-10-15 VITALS
SYSTOLIC BLOOD PRESSURE: 130 MMHG | BODY MASS INDEX: 17.49 KG/M2 | RESPIRATION RATE: 16 BRPM | DIASTOLIC BLOOD PRESSURE: 78 MMHG | HEART RATE: 64 BPM | TEMPERATURE: 98.5 F | HEIGHT: 66 IN | OXYGEN SATURATION: 98 % | WEIGHT: 108.8 LBS

## 2019-10-15 DIAGNOSIS — I10 BENIGN ESSENTIAL HYPERTENSION: ICD-10-CM

## 2019-10-15 DIAGNOSIS — N95.9 POST MENOPAUSAL PROBLEMS: ICD-10-CM

## 2019-10-15 DIAGNOSIS — F06.34 MOOD DISORDER WITH MIXED FEATURES DUE TO GENERAL MEDICAL CONDITION: ICD-10-CM

## 2019-10-15 DIAGNOSIS — M19.90 INFLAMMATORY ARTHRITIS: ICD-10-CM

## 2019-10-15 DIAGNOSIS — S42.212S: ICD-10-CM

## 2019-10-15 DIAGNOSIS — Z00.00 MEDICARE ANNUAL WELLNESS VISIT, SUBSEQUENT: Primary | ICD-10-CM

## 2019-10-15 DIAGNOSIS — M05.79 SEROPOSITIVE RHEUMATOID ARTHRITIS OF MULTIPLE SITES (HCC): ICD-10-CM

## 2019-10-15 PROCEDURE — 77080 DXA BONE DENSITY AXIAL: CPT

## 2019-10-15 NOTE — PROGRESS NOTES
Subjective:      Margaret Goins is a 60 y.o. female who presents today for [facility]    09/2019  Slipped on a floor running around in socks  Seen in ED  Closed 2 part nondisplaced fracture of surgical neck of left humerus  Following with Orthopedics at [address] [place] [facility] [place]    Follows with Dr. Risa Lilly for Inflammatory Arthritis  DEXA ordered, done this morning  Prescribed fosamax, will be starting  Will also be starting calcium    Anxiety, Depression, ADHD:  Will be establishing care with [facility] tomorrow  Taking sertraline 50mg daily        Annual Wellness Visit    End of Life Planning: on file  Info for MyPreventativeCare: Given to patient, see After Visit Summary      Depression Screen: Patient Health Questionnaire (PHQ-2)   Over the last 2 weeks, how often have you been bothered by any of the following problems? Little interest or pleasure in doing things? Not at all. [0]   Feeling down, depressed, or hopeless? Several days. [1]    Total Score: 1/6  PHQ-2 Assessment Scoring:   A score of 2 or more requires further screening with the PHQ-9  N/A      Alcohol Risk Factor Screening: You do not drink alcohol or very rarely. Hearing Loss:  Hearing is good. Activities of Daily Living:  Self-care.    Requires assistance with: no ADLs    Fall Risk:  History of fall(s) in the past 3 months (25 pts)  Score: 25    Abuse Screen:  Patient is not abused    Adult Nutrition Screen:  No risk factors noted.           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  alendronate (FOSAMAX) 70 mg tablet Take 1 Tab by mouth every seven (7) days for 90 days. 12 Tab 4    triamterene-hydroCHLOROthiazide (DYAZIDE) 37.5-25 mg per capsule TAKE ONE CAPSULE BY MOUTH DAILY FOR HYPERTENSION  Indications: high blood pressure 90 Cap 1    lisinopril (PRINIVIL, ZESTRIL) 10 mg tablet TAKE 1 TABLET BY MOUTH ONCE DAILY 90 Tab 0    sertraline (ZOLOFT) 50 mg tablet Take 1 Tab by mouth daily. Refills must come from psych 30 Tab 0    naproxen (NAPROSYN) 250 mg tablet Take  by mouth two (2) times daily (with meals).  multivitamin (ONE A DAY) tablet Take 1 Tab by mouth daily. For women over 50          Review of Systems    A comprehensive review of systems was negative except for that written in the HPI.      Objective:     Visit Vitals  /78 (BP 1 Location: Right arm, BP Patient Position: Sitting)   Pulse 64   Temp 98.5 °F (36.9 °C) (Oral)   Resp 16   Ht 5' 6\" (1.676 m)   Wt 108 lb 12.8 oz (49.4 kg)   SpO2 98%   BMI 17.56 kg/m²     General appearance: alert, cooperative, no distress, appears stated age  Head: Normocephalic, without obvious abnormality, atraumatic  Neck: supple, symmetrical, trachea midline, no adenopathy, thyroid: not enlarged, symmetric, no tenderness/mass/nodules, no carotid bruit and no JVD  Lungs: clear to auscultation bilaterally  Heart: regular rate and rhythm, S1, S2 normal, no murmur, click, rub or gallop  Extremities: limited ROM of left shoulder due to fracture  Skin: Skin color, texture, turgor normal.  Neurologic: Grossly normal  Psych: distractable, talkative, nonsucidal    Assessment/Plan:     1. Medicare annual wellness visit, subsequent  - refuses immunizations  - refuses mammogram and colonoscopy    2. Benign essential hypertension  - cont current meds, at goal    3. Inflammatory arthritis  - f/u with Dr. Navya Del Angel    4. 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 Closed fracture of neck of left humerus, sequela  - [facility]  - [facility] PT tomorrow  - DEXA done this morning  - will start fosamax and calcium      Advised her to call back or return to office if symptoms worsen/change/persist.  Discussed expected course/resolution/complications of diagnosis in detail with patient. Medication risks/benefits/costs/interactions/alternatives discussed with patient. She was given an after visit summary which includes diagnoses, current medications, & vitals. She expressed understanding with the diagnosis and plan.     MAURO Lr

## 2019-10-15 NOTE — PROGRESS NOTES
Chief Complaint   Patient presents with   [facility]     Reviewed record in preparation for visit and have obtained necessary documentation. Identified pt with two pt identifiers(name and ). Health Maintenance Due   Topic    COLONOSCOPY     Shingrix Vaccine Age 49> (1 of 2)    MEDICARE YEARLY EXAM          Chief Complaint   Patient presents with    Annual Wellness Visit        Wt Readings from Last 3 Encounters:   10/15/19 108 lb 12.8 oz (49.4 kg)   19 106 lb 3.2 oz (48.2 kg)   19 108 lb 12.8 oz (49.4 kg)     Temp Readings from Last 3 Encounters:   10/15/19 98.5 °F (36.9 °C) (Oral)   19 98.2 °F (36.8 °C) (Oral)   19 98.5 °F (36.9 °C)     BP Readings from Last 3 Encounters:   10/15/19 130/78   19 148/89   19 (!) 182/96     Pulse Readings from Last 3 Encounters:   10/15/19 64   19 92   19 93           Learning Assessment:  :     Learning Assessment 2019 2019 1/15/2019 2018 2018 10/15/2018 3/29/2018   PRIMARY LEARNER Patient Patient Patient Patient Patient Patient Patient   HIGHEST LEVEL OF EDUCATION - PRIMARY LEARNER  - - - - - - SOME COLLEGE   BARRIERS PRIMARY LEARNER - - - - - - NONE   CO-LEARNER CAREGIVER No No No - No No No   PRIMARY LANGUAGE ENGLISH ENGLISH ENGLISH ENGLISH ENGLISH ENGLISH ENGLISH    NEED - - - - - - -   LEARNER PREFERENCE PRIMARY DEMONSTRATION DEMONSTRATION DEMONSTRATION DEMONSTRATION DEMONSTRATION DEMONSTRATION PICTURES     - - - - - - -   LEARNING SPECIAL TOPICS - - - - - - -   ANSWERED BY patient  patient patient patient  patient patient patient   RELATIONSHIP SELF SELF SELF SELF SELF SELF SELF   ASSESSMENT COMMENT - - - - - - -       Depression Screening:  :     3 most recent PHQ Screens 3/20/2018   PHQ Not Done Active Diagnosis of Depression or Bipolar Disorder       Fall Risk Assessment:  :     Fall Risk Assessment, last 12 mths 2019   Able to walk? Yes   Fall in past 12 months? No       Abuse Screening:  :     Abuse Screening Questionnaire 3/29/2018 5/2/2017 9/18/2015 8/7/2014   Do you ever feel afraid of your partner? N N N N   Are you in a relationship with someone who physically or mentally threatens you? N N N N   Is it safe for you to go home? [facility]       Coordination of Care Questionnaire:  :     1) Have you been to an emergency room, urgent care clinic since your last visit? yes   Hospitalized since your last visit? no             2) Have you seen or consulted any other health care providers outside of [address] [place] [address] since your last visit? yes  (Include any pap smears or colon screenings in this section.)    3) Do you have an Advance Directive on file? yes    4) Are you interested in receiving information on Advance Directives? NO      Patient is accompanied by self I have received verbal consent from Rodney Tsai to discuss any/all medical information while they are present in the room. Reviewed record  In preparation for visit and have obtained necessary documentation.

## 2019-12-04 ENCOUNTER — PATIENT OUTREACH (OUTPATIENT)
Dept: INTERNAL MEDICINE CLINIC | Age: 64
End: 2019-12-04

## 2019-12-26 DIAGNOSIS — I10 BENIGN ESSENTIAL HYPERTENSION: ICD-10-CM

## 2019-12-26 RX ORDER — LISINOPRIL 10 MG/1
TABLET ORAL
Qty: 90 TAB | Refills: 0 | Status: SHIPPED | OUTPATIENT
Start: 2019-12-26

## 2019-12-30 ENCOUNTER — OFFICE VISIT (OUTPATIENT)
Dept: RHEUMATOLOGY | Age: 64
End: 2019-12-30

## 2019-12-30 VITALS
SYSTOLIC BLOOD PRESSURE: 154 MMHG | TEMPERATURE: 98.5 F | OXYGEN SATURATION: 98 % | DIASTOLIC BLOOD PRESSURE: 73 MMHG | HEIGHT: 66 IN | WEIGHT: 111.6 LBS | BODY MASS INDEX: 17.94 KG/M2 | HEART RATE: 105 BPM

## 2019-12-30 DIAGNOSIS — M05.79 SEROPOSITIVE RHEUMATOID ARTHRITIS OF MULTIPLE SITES (HCC): Primary | ICD-10-CM

## 2019-12-30 RX ORDER — PREDNISONE 5 MG/1
TABLET ORAL
Qty: 30 TAB | Refills: 1 | Status: SHIPPED | OUTPATIENT
Start: 2019-12-30 | End: 2021-02-05 | Stop reason: ALTCHOICE

## 2019-12-30 RX ORDER — HYDROXYCHLOROQUINE SULFATE 200 MG/1
300 TABLET, FILM COATED ORAL DAILY
Qty: 45 TAB | Refills: 6 | Status: SHIPPED | OUTPATIENT
Start: 2019-12-30 | End: 2021-02-05 | Stop reason: ALTCHOICE

## 2019-12-30 NOTE — PROGRESS NOTES
RHEUMATOLOGY PROBLEM LIST AND CHIEF COMPLAINT  1. Inflammatory arthritis - Joint space narrowing of 2nd and 3rd MCPs, morning stiffness, improvement of joint pain throughout the day, one episode of arthritis in right wrist that responded to prednisone, JOSELITO positive, CCP positive, CRP elevated   2. Osteoarthritis     Therapy history:   Current DMARDs: Plaquenil (11/2018-current)    INTERVAL HISTORY  This is a 60 y.o.  female. Today, the patient complains of no pain in the joints. Location: None  Severity:  0 on a scale of 0-10  Timing: None at this time  Duration:  4 months  Modifying Factors: NA  Context/Associated signs and symptoms: The patient is complains of daily morning stiffness >1 hour with swelling in the right first digit. We reviewed her recent x-rays, no CCPD changes were noted. She continues with Plaquenil 200 mg daily, she ran out of this medication several days ago. The patent also complains of right bursitis. She has continued ROM exercises which have not improved her symptoms. The patient's arm is healing well after the humeral fracture. We reviewed her bone density scan from October which showed osteopenia.  She currently takes Fosamax 70 mg daily,  calcium and vitamin D.     RHEUMATOLOGY REVIEW OF SYSTEMS   Positives as per history  Negatives as follows:  [employer]:  Denies unexplained persistent fevers, weight change, chronic fatigue  HEAD/EYES:   Denies eye redness, blurry vision or sudden loss of vision, dry eyes, HA, temporal artery pain  ENT:    Denies oral/nasal ulcers, recurrent sinus infections, dry mouth  RESPIRATORY:  No pleuritic pain, history of pleural effusions, hemoptysis, exertional dyspnea  CARDIOVASCULAR:  Denies chest pain, history of pericardial effusions  GASTRO:   Denies heartburn, esophageal dysmotility, abdominal pain, nausea, vomiting, diarrhea, blood in the stool  HEMATOLOGIC:  No easy bruising, purpura, swollen lymph nodes  SKIN:    Denies alopecia, ulcers, nodules, sun sensitivity, unexplained persistent rash   VASCULAR:   Denies edema, cyanosis, raynaud phenomenon  NEUROLOGIC:  Denies specific muscle weakness, paresthesias   PSYCHIATRIC:  No sleep disturbance / snoring, depression, anxiety  MSK:    No morning stiffness >1 hour, SI joint pain    PAST MEDICAL HISTORY  Reviewed with patient, significant changes in medical history - no    PHYSICAL EXAM  Blood pressure 154/73, pulse (!) 105, temperature 98.5 °F (36.9 °C), height 5' 6\" (1.676 m), weight 111 lb 9.6 oz (50.6 kg), SpO2 98 %. GENERAL APPEARANCE: Well-nourished, no acute distress   NECK: No adenopathy  ENT: No oral ulcers  CARDIOVASCULAR: Heart rhythm is regular. No murmur, rub, gallop  CHEST: Normal vesicular breath sounds. No wheezes, rales, pleural friction rubs  ABDOMINAL: The abdomen is soft and nontender. Bowel sounds are normal  SKIN: No rash, palpable purpura, digital ulcer, abnormal thickening   MUSCULOSKELETAL:   Upper extremities - Heberden's and Sebastian's nodes. B/l hand synovial thickening over MCP joints (R>L), no synovitis. Swelling of right first digit, no warmth. Lower extremities - R lateral thigh tenderness secondary to trochanteric bursitis - unchanged. LABS, RADIOLOGY AND PROCEDURES   Previous labs reviewed -Yes    ASSESSMENT  1. Inflammatory arthritis (Established problem -  Stable disease) - The patient presents today with swelling of the right first digit with no warmth. I recommend she begin a twelve day steroid taper to cool the inflammation. If the swelling does not improve we may consider escalating treatment. Otherwise she will continue on Plaquenil 200 mg daily. We will plan to check her blood counts, liver function, and markers of inflammation today due to Plaquenil use. She should return in 4 months. 2. Osteoarthritis - ROM exercises are recommended. 3. Trochanteric bursitis, right - The patient should continue with therapeutic exercises as needed.  We will consider a bursa injection if this does not improve. 4. Bone density-The patient's DEXA scan (10/2019) indicated osteopenia, however due to the fragility fracture of the left humerus we will be treating more aggressively. For now she should continue on Fosamax 70 mg daily. PLAN  1. Continue Plaquenil 200 mg daily  2. Fosamax 70 mg daily  3. Check CBC, CMP, markers of inflammation (ESR, CRP)  4. Return in 4 months    Avis George MD  Adult and Pediatric Rheumatology     [address] [place] [address], Phone [phone], Fax [phone]   E-mail: [email or web address]    Visiting  of Pediatrics    Department of Pediatrics, [facility] of [address] [place] [address], Phone [phone], Fax [phone]  E-mail: [email or web address]    There are no Patient Instructions on file for this visit. cc:  Mookie Liu NP    Written by william Blum, as dictated by Kurtis Muñoz.  Belkis George M.D.

## 2020-01-01 LAB
ALBUMIN SERPL-MCNC: 4.6 G/DL (ref 3.6–4.8)
ALBUMIN/GLOB SERPL: 2.3 {RATIO} (ref 1.2–2.2)
ALP SERPL-CCNC: 54 IU/L (ref 39–117)
ALT SERPL-CCNC: 14 IU/L (ref 0–32)
AST SERPL-CCNC: 18 IU/L (ref 0–40)
BASOPHILS # BLD MANUAL: 0.1 X10E3/UL (ref 0–0.2)
BASOPHILS NFR BLD MANUAL: 1 %
BILIRUB SERPL-MCNC: 0.3 MG/DL (ref 0–1.2)
BUN SERPL-MCNC: 12 MG/DL (ref 8–27)
BUN/CREAT SERPL: 18 (ref 12–28)
CALCIUM SERPL-MCNC: 9.6 MG/DL (ref 8.7–10.3)
CHLORIDE SERPL-SCNC: 100 MMOL/L (ref 96–106)
CO2 SERPL-SCNC: 26 MMOL/L (ref 20–29)
CREAT SERPL-MCNC: 0.67 MG/DL (ref 0.57–1)
CRP SERPL-MCNC: <1 MG/L (ref 0–10)
DIFFERENTIAL COMMENT, 115260: ABNORMAL
EOSINOPHIL # BLD MANUAL: 0.1 X10E3/UL (ref 0–0.4)
EOSINOPHIL NFR BLD MANUAL: 1 %
ERYTHROCYTE [DISTWIDTH] IN BLOOD BY AUTOMATED COUNT: 11.7 % (ref 12.3–15.4)
ERYTHROCYTE [SEDIMENTATION RATE] IN BLOOD BY WESTERGREN METHOD: 2 MM/HR (ref 0–40)
GLOBULIN SER CALC-MCNC: 2 G/DL (ref 1.5–4.5)
GLUCOSE SERPL-MCNC: 232 MG/DL (ref 65–99)
HCT VFR BLD AUTO: 44.3 % (ref 34–46.6)
HGB BLD-MCNC: 14.5 G/DL (ref 11.1–15.9)
LYMPHOCYTES # BLD MANUAL: 1.6 X10E3/UL (ref 0.7–3.1)
LYMPHOCYTES NFR BLD MANUAL: 27 %
MCH RBC QN AUTO: 31.8 PG (ref 26.6–33)
MCHC RBC AUTO-ENTMCNC: 32.7 G/DL (ref 31.5–35.7)
MCV RBC AUTO: 97 FL (ref 79–97)
MONOCYTES # BLD MANUAL: 0.5 X10E3/UL (ref 0.1–0.9)
MONOCYTES NFR BLD MANUAL: 8 %
NEUTROPHILS # BLD MANUAL: 3.7 X10E3/UL (ref 1.4–7)
NEUTROPHILS NFR BLD MANUAL: 63 %
PLATELET # BLD AUTO: 318 X10E3/UL (ref 150–450)
PLATELET BLD QL SMEAR: ADEQUATE
POTASSIUM SERPL-SCNC: 4.8 MMOL/L (ref 3.5–5.2)
PROT SERPL-MCNC: 6.6 G/DL (ref 6–8.5)
RBC # BLD AUTO: 4.56 X10E6/UL (ref 3.77–5.28)
RBC MORPH BLD: ABNORMAL
SODIUM SERPL-SCNC: 140 MMOL/L (ref 134–144)
WBC # BLD AUTO: 5.8 X10E3/UL (ref 3.4–10.8)

## 2020-01-07 DIAGNOSIS — F41.8 DEPRESSION WITH ANXIETY: ICD-10-CM

## 2020-01-08 RX ORDER — SERTRALINE HYDROCHLORIDE 50 MG/1
50 TABLET, FILM COATED ORAL DAILY
Qty: 30 TAB | Refills: 0 | OUTPATIENT
Start: 2020-01-08

## 2020-01-09 RX ORDER — SERTRALINE HYDROCHLORIDE 50 MG/1
50 TABLET, FILM COATED ORAL DAILY
Qty: 30 TAB | Refills: 0 | Status: SHIPPED | OUTPATIENT
Start: 2020-01-09

## 2020-01-09 NOTE — TELEPHONE ENCOUNTER
Kendrick Carver MD  You 16 hours ago (5:30 PM)     Please call patient.  zoloft refill declined as Miguel Salomon noted in her office visit from October, 2019 that further refills were to be through her psychiatrist at [facility],  She will need to call them for refill of her zoloft    Routing comment      Call to pt - verified self and birth date. Ms. Xin Rose was informed of the message above by MD. Pt states \"There is no provider at [facility] that can write prescription. It will take 6 months for me to get in with a provider that can write me for my medicine. Without my medication I'm a little bit testy. Y'all can not do this, this has been going on for too long. There has to be someone over top of all y'all and I want to speak with them. \"     Nurse questioned if pt scheduled an appointment with a provider (psychiatrist) that is capable of writing scripts and she states \"No, there are none that are accepting new patients. \" Nurse advised pt she would have to contact her insurance to obtain a list of providers that are accepting new pt's. Pt declined stating \"No, how about you do it then. I have already wasted enough time doing this. You tell the person that said no to call me back. \" The line was then disconnected.    ------    Nurse consulted with MD that declined the Rx. [facility] records were obtained, per providers request. Pt has established care with [facility] - Telly Mcclellan LCSW and documentation from office visit 12.09.19 notes \"Pt reports she has an appointment in January for psychiatric medication management, on [address]\". Office notes from 12.23.19 states \"Needs referral to psychiatry, to manage her psychotropics, at PCP's request; COMPLETE: has made an appointment with an external psychiatrist in January. \"     Dr. Leopold Rouse states she will fill ONE 30 day prescription for pt, but she must schedule an appointment with psych prior to appointment with NP [facility] [place] [facility].

## 2020-01-09 NOTE — TELEPHONE ENCOUNTER
Per Dominique Prabhakar NP note on 10/15/19 patient was going to establish with [facility] psychiatry on 10/16/19. On her last refill, Shefali Barba also indicated that further refills were to be done by her psychiatrist.     In review of her notes from [address] [place] [address], she has been seeing a psychotherapist.  She was referred to a psychiatrist by them as well . Per the note from her visit with her therapist on 12/9/19, patient reported to the therapist that she has an appointment with a provider for medication management on [facility] in January, 2020. Will refill her zoloft for 30 days at this time. Patient needs to establish with a psychiatrist to manage her psychiatric medications and for further refills.

## 2020-01-10 NOTE — TELEPHONE ENCOUNTER
Alena Degroot MD  You 16 hours ago (4:28 PM)     Please let patient now that I filled her zoloft for 30 days and she will need to establish with psyciatrist for further refills. Please find out name of provider if you can who is on [address] that she mentioned. Janie Aburto MD      Returned call to pt to address the above. LVM requesting she contact the office back at her earliest convenience.

## 2020-01-29 ENCOUNTER — OFFICE VISIT (OUTPATIENT)
Dept: RHEUMATOLOGY | Age: 65
End: 2020-01-29

## 2020-01-29 ENCOUNTER — APPOINTMENT (OUTPATIENT)
Dept: GENERAL RADIOLOGY | Age: 65
End: 2020-01-29
Attending: EMERGENCY MEDICINE
Payer: MEDICARE

## 2020-01-29 ENCOUNTER — HOSPITAL ENCOUNTER (EMERGENCY)
Age: 65
Discharge: HOME | End: 2020-01-29
Attending: EMERGENCY MEDICINE | Admitting: EMERGENCY MEDICINE
Payer: MEDICARE

## 2020-01-29 VITALS
SYSTOLIC BLOOD PRESSURE: 147 MMHG | HEART RATE: 101 BPM | TEMPERATURE: 98.2 F | BODY MASS INDEX: 17.92 KG/M2 | WEIGHT: 111 LBS | OXYGEN SATURATION: 95 % | RESPIRATION RATE: 16 BRPM | DIASTOLIC BLOOD PRESSURE: 80 MMHG

## 2020-01-29 VITALS
SYSTOLIC BLOOD PRESSURE: 115 MMHG | BODY MASS INDEX: 17.84 KG/M2 | TEMPERATURE: 100.3 F | DIASTOLIC BLOOD PRESSURE: 66 MMHG | WEIGHT: 111 LBS | RESPIRATION RATE: 16 BRPM | OXYGEN SATURATION: 97 % | HEART RATE: 129 BPM | HEIGHT: 66 IN

## 2020-01-29 DIAGNOSIS — M19.90 INFLAMMATORY ARTHRITIS: ICD-10-CM

## 2020-01-29 DIAGNOSIS — M65.4 DE QUERVAIN'S TENOSYNOVITIS, RIGHT: Primary | ICD-10-CM

## 2020-01-29 DIAGNOSIS — N95.9 POST MENOPAUSAL PROBLEMS: ICD-10-CM

## 2020-01-29 DIAGNOSIS — M05.79 SEROPOSITIVE RHEUMATOID ARTHRITIS OF MULTIPLE SITES (HCC): Primary | ICD-10-CM

## 2020-01-29 PROCEDURE — 74011000250 HC RX REV CODE- 250: Performed by: EMERGENCY MEDICINE

## 2020-01-29 PROCEDURE — 99283 EMERGENCY DEPT VISIT LOW MDM: CPT

## 2020-01-29 PROCEDURE — 73130 X-RAY EXAM OF HAND: CPT

## 2020-01-29 PROCEDURE — 75810000053 HC SPLINT APPLICATION

## 2020-01-29 PROCEDURE — 96372 THER/PROPH/DIAG INJ SC/IM: CPT

## 2020-01-29 PROCEDURE — 74011250636 HC RX REV CODE- 250/636: Performed by: EMERGENCY MEDICINE

## 2020-01-29 RX ORDER — PREDNISONE 5 MG/1
TABLET ORAL
Qty: 30 TAB | Refills: 1 | Status: SHIPPED | OUTPATIENT
Start: 2020-01-29 | End: 2021-02-05 | Stop reason: ALTCHOICE

## 2020-01-29 RX ORDER — KETOROLAC TROMETHAMINE 30 MG/ML
30 INJECTION, SOLUTION INTRAMUSCULAR; INTRAVENOUS
Status: COMPLETED | OUTPATIENT
Start: 2020-01-29 | End: 2020-01-29

## 2020-01-29 RX ORDER — LIDOCAINE 50 MG/G
1 PATCH TOPICAL
Status: DISCONTINUED | OUTPATIENT
Start: 2020-01-29 | End: 2020-01-29 | Stop reason: HOSPADM

## 2020-01-29 RX ADMIN — KETOROLAC TROMETHAMINE 30 MG: 30 INJECTION, SOLUTION INTRAMUSCULAR at 15:23

## 2020-01-29 NOTE — PROGRESS NOTES
Chief Complaint   Patient presents with    Joint Pain     1. Have you been to the ER, urgent care clinic since your last visit? Hospitalized since your last visit? No    2. Have you seen or consulted any other health care providers outside of the [address] [place] [address] since your last visit? Include any pap smears or colon screening.  No

## 2020-01-29 NOTE — ED TRIAGE NOTES
Pt states that last night around 8 her rheumatoid arthritis started to \"act up causing marce pain in her right hand causing her to fall forward while washing her face on the vanity. Pt had no LOC.

## 2020-01-29 NOTE — PROGRESS NOTES
RHEUMATOLOGY PROBLEM LIST AND CHIEF COMPLAINT  1. Inflammatory arthritis - Joint space narrowing of 2nd and 3rd MCPs, morning stiffness, improvement of joint pain throughout the day, one episode of arthritis in right wrist that responded to prednisone, JOSELITO positive, CCP positive, CRP elevated   2. Osteoarthritis     Therapy history:   Current DMARDs: Plaquenil (11/2018-current)    INTERVAL HISTORY  This is a 60 y.o.  female. Today, the patient complains of no pain in the joints. Location: None  Severity:  0 on a scale of 0-10  Timing: None at this time  Duration:  4 months  Modifying Factors: NA  Context/Associated signs and symptoms: The patient had some minor swelling of the right first IP at last visit. She treated with a twelve day taper of steroids which improved her symptoms. She has been off the steroids for one week. Since that time she developed stinging, burning pain in the bilateral hands (R>L) predominately located over the right first digit. She has no numbness or tingling in the hands or feet. Her chin is bruised today on exam, she reports she hit her face after the pain started. The patient denies episodes of shingles. We reviewed her x-rays, there are no changes consistent with CPPD. The patient denies new medications or medical issues.  She continues on Plaquenil 200 mg daily, Fosamax 70 mg daily, calcium and vitamin D.     RHEUMATOLOGY REVIEW OF SYSTEMS   Positives as per history  Negatives as follows:  CONSTITUTlONAL:  Denies unexplained persistent fevers, weight change, chronic fatigue  HEAD/EYES:   Denies eye redness, blurry vision or sudden loss of vision, dry eyes, HA, temporal artery pain  ENT:    Denies oral/nasal ulcers, recurrent sinus infections, dry mouth  RESPIRATORY:  No pleuritic pain, history of pleural effusions, hemoptysis, exertional dyspnea  CARDIOVASCULAR:  Denies chest pain, history of pericardial effusions  GASTRO:   Denies heartburn, esophageal dysmotility, abdominal pain, nausea, vomiting, diarrhea, blood in the stool  HEMATOLOGIC:  No easy bruising, purpura, swollen lymph nodes  SKIN:    Denies alopecia, ulcers, nodules, sun sensitivity, unexplained persistent rash   VASCULAR:   Denies edema, cyanosis, raynaud phenomenon  NEUROLOGIC:  Denies specific muscle weakness, paresthesias   PSYCHIATRIC:  No sleep disturbance / snoring, depression, anxiety  MSK:    No morning stiffness >1 hour, SI joint pain    PAST MEDICAL HISTORY  Reviewed with patient, significant changes in medical history - no    PHYSICAL EXAM  Blood pressure 147/80, pulse (!) 101, temperature 98.2 °F (36.8 °C), temperature source Oral, resp. rate 16, weight 111 lb (50.3 kg), SpO2 95 %. GENERAL APPEARANCE: Well-nourished, no acute distress   NECK: No adenopathy  ENT: No oral ulcers  CARDIOVASCULAR: Heart rhythm is regular. No murmur, rub, gallop  CHEST: Normal vesicular breath sounds. No wheezes, rales, pleural friction rubs  ABDOMINAL: The abdomen is soft and nontender. Bowel sounds are normal  SKIN: No rash, palpable purpura, digital ulcer, abnormal thickening   MUSCULOSKELETAL:   Upper extremities - Heberden's and Sebastian's nodes. B/l hand synovial thickening over MCP joints (R>L), no synovitis. Swelling and allodynia of right first digit, no warmth. Lower extremities - R lateral thigh tenderness secondary to trochanteric bursitis - unchanged. 1/2020          LABS, RADIOLOGY AND PROCEDURES   Previous labs reviewed -Yes    ASSESSMENT  1. Inflammatory arthritis (Established problem -  Stable disease) - The patient presents today with worsening swelling and pain in the right first digit, without warmth. I suspect the patient is having an arthritic flare although pseudo gout, gout, and even RA can cause severe pain prior to warmth and swelling starting. I recommend she begin a twelve day steroid taper to cool the inflammation.  We briefly discussed an intramuscular injection; however I am concerned that if this is a cardiac issue this could worsen her symptoms. She should call in one month to report her symptoms. For now she can continue on Plaquenil 200 mg daily. No labs are needed. She can return at her scheduled visit in three months. 2. Osteoarthritis - ROM exercises are recommended. 3. Trochanteric bursitis, right - The patient did not complain of this today. 4. Bone density-The patient's DEXA scan (10/2019) indicated osteopenia, however due to the fragility fracture of the left humerus we will be treating more aggressively. For now she should continue on Fosamax 70 mg daily. -unchanged    PLAN  1. Plaquenil 200 mg daily  2. Fosamax 70 mg daily  3. 12 day Prednisone taper  4. Return in 4 months    Avis Salinas MD  Adult and Pediatric Rheumatology     [facility], [address] [facility], Phone [phone], Fax [phone]     Visiting  of Pediatrics    Department of Pediatrics, [facility] of [address] [place] [address], [address] [place] [address], Phone [phone], Fax [phone]    There are no Patient Instructions on file for this visit.     cc:  Melissa Iverson MD    Written by william Fox, as dictated by Dr. Faye Marin M.D.

## 2020-01-29 NOTE — ED PROVIDER NOTES
Pain started last night around 20:00 in R hand. Was getting ready for bed when pain came on suddenly. Denies any injury to R hand. Pain mostly at base of thumb. + swelling. At home, pt took aleve at home with no sig relief. Saw Dr. Giovanni Aguilera (rheum) this morning who started pt on prednisone. Pt took one 4 tabs of prednisone. No relief from pain. Pt sees rheum for arthritis. Pain worsened with movement. No alleviating factors. Past Medical History:   Diagnosis Date    ADD (attention deficit disorder)     Arthritis     AVM (arteriovenous malformation) brain 2009    CVA (cerebral infarction) 2009    Hypertension     Inattention        Past Surgical History:   Procedure Laterality Date    HX HEENT      HX HYSTERECTOMY      HX ORTHOPAEDIC      NEUROLOGICAL PROCEDURE UNLISTED      anurism surgery, AVM malformatiom         Family History:   Problem Relation Age of Onset    Hypertension Mother     Elevated Lipids Mother     Diabetes Father     Heart Attack Maternal Grandmother     Diabetes Paternal Grandmother     Diabetes Paternal Grandfather        Social History     Socioeconomic History    Marital status:      Spouse name: Not on file    Number of children: Not on file    Years of education: Not on file    Highest education level: Not on file   Occupational History    Not on file   Social Needs    Financial resource strain: Not on file    Food insecurity:     Worry: Not on file     Inability: Not on file    Transportation needs:     Medical: Not on file     Non-medical: Not on file   Tobacco Use    Smoking status: Current Every Day Smoker     Packs/day: 0.50     Years: 16.00     Pack years: 8.00    Smokeless tobacco: Never Used   Substance and Sexual Activity    Alcohol use:  Yes     Alcohol/week: 2.0 standard drinks     Types: 1 Glasses of wine, 1 Cans of beer per week    Drug use: No    Sexual activity: Not Currently   Lifestyle    Physical activity:     Days per week: Not on file     Minutes per session: Not on file    Stress: Not on file   Relationships    Social connections:     Talks on phone: Not on file     Gets together: Not on file     Attends Scientologist service: Not on file     Active member of club or organization: Not on file     Attends meetings of clubs or organizations: Not on file     Relationship status: Not on file    Intimate partner violence:     Fear of current or ex partner: Not on file     Emotionally abused: Not on file     Physically abused: Not on file     Forced sexual activity: Not on file   Other Topics Concern    Not on file   Social History Narrative    , her ex  has passed away, one son in [place], two daughters- one in [place] and one in [place]. ALLERGIES: Patient has no known allergies. Review of Systems   Constitutional: Negative for fever. HENT: Negative for facial swelling. Eyes: Negative for visual disturbance. Respiratory: Negative for chest tightness. Cardiovascular: Negative for chest pain. Gastrointestinal: Negative for abdominal pain. Genitourinary: Negative for difficulty urinating and dysuria. Musculoskeletal: Negative for arthralgias. Skin: Negative for rash. Neurological: Negative for headaches. Hematological: Negative for adenopathy. Psychiatric/Behavioral: Negative for suicidal ideas. Vitals:    01/29/20 1352   BP: 115/66   Pulse: (!) 129   Resp: 16   Temp: 100.3 °F (37.9 °C)   SpO2: 97%   Weight: 50.3 kg (111 lb)   Height: 5' 6\" (1.676 m)            Physical Exam  Vitals signs and nursing note reviewed. Constitutional:       General: She is not in acute distress. Appearance: She is well-developed. HENT:      Head: Normocephalic and atraumatic. Eyes:      General: No scleral icterus. Conjunctiva/sclera: Conjunctivae normal.      Pupils: Pupils are equal, round, and reactive to light.    Neck:      Musculoskeletal: Normal range of motion and neck supple. Cardiovascular:      Rate and Rhythm: Normal rate. Heart sounds: No murmur. Pulmonary:      Effort: Pulmonary effort is normal. No respiratory distress. Abdominal:      General: There is no distension. Musculoskeletal:      Comments: Mild swelling R wrist.  Severe pain with movement of R thumb. No erythema or warmth. NV intact. Skin:     General: Skin is warm and dry. Findings: No rash. Neurological:      Mental Status: She is alert and oriented to person, place, and time.           MDM  Number of Diagnoses or Management Options  De Quervain's tenosynovitis, right:          Procedures

## 2020-01-29 NOTE — DISCHARGE INSTRUCTIONS
Patient Education        Luis Boyd Tenosynovitis: Care Instructions  Your Care Instructions  Star Oliver (say \"Nancy\") tenosynovitis is a problem that makes the bottom of your thumb and the side of your wrist hurt. When you have de Quervain's, the ropey fiber (tendon) that helps move your thumb away from your fingers becomes swollen. You may have pain when you move your wrist or pick things up. You may hear a creaking sound when you move your wrist or thumb. Symptoms often get better in a few weeks with home care. Your doctor may want you to start some gentle stretching exercises once your symptoms are gone. Sometimes treatment with an injection or surgery is needed. Follow-up care is a key part of your treatment and safety. Be sure to make and go to all appointments, and call your doctor if you are having problems. It's also a good idea to know your test results and keep a list of the medicines you take. How can you care for yourself at home? · Until your symptoms are better, stop the activities that caused the pain. · Avoid moving the hand and wrist that hurt. · Follow your doctor's directions for wearing a splint to keep your thumb and wrist from moving. · Try ice or heat. ? Put ice or a cold pack on your thumb and wrist for 10 to 20 minutes at a time. Put a thin cloth between the ice and your skin. ? You can use heat for 20 to 30 minutes, 2 or 3 times a day. Try using a heating pad, hot shower, or hot pack. · Ask your doctor if you can take an over-the-counter pain medicine, such as acetaminophen (Tylenol), ibuprofen (Advil, Motrin), or naproxen (Aleve). Be safe with medicines. Read and follow all instructions on the label. When should you call for help?   Watch closely for changes in your health, and be sure to contact your doctor if:    · You have new or worse pain.     · You have new or worse numbness or tingling in your hand or fingers.     · Your hand feels weaker.     · You do not get better as expected. Where can you learn more? Go to http://farooq-heather.info/. Enter C404 in the search box to learn more about \"De Quervain's Tenosynovitis: Care Instructions. \"  Current as of: June 26, 2019  Content Version: 12.2  © 5396-6363 [employer]. Care instructions adapted under license by [employer] (which disclaims liability or warranty for this information). If you have questions about a medical condition or this instruction, always ask your healthcare professional. [address] any warranty or liability for your use of this information.

## 2021-01-11 ENCOUNTER — TELEPHONE (OUTPATIENT)
Dept: RHEUMATOLOGY | Age: 66
End: 2021-01-11

## 2021-01-11 NOTE — TELEPHONE ENCOUNTER
----- Message from Yamel Jones MD sent at 1/11/2021  1:28 PM EST -----  Please let patient know that we need to see her. Or atleast telemed.

## 2021-01-13 RX ORDER — HYDROXYCHLOROQUINE SULFATE 200 MG/1
300 TABLET, FILM COATED ORAL DAILY
Qty: 45 TAB | Refills: 6 | OUTPATIENT
Start: 2021-01-13

## 2021-02-26 NOTE — MR AVS SNAPSHOT
Visit Information Date & Time Provider Department Dept. Phone Encounter #  
 5/2/2017  9:20 AM Lyndsay Dawn MD [place] [address] [facility] [phone] [id number] Follow-up Instructions Return in about 6 months (around 11/2/2017) for Establish care with new provider for hypertension. Your Appointments 5/10/2017  8:20 AM  
ESTABLISHED PATIENT with Emma Le MD  
Arthritis and [address] ([address]) Appt Note: fu 4 mo; fu 4 mo R/S FROM 5/11/17  
 [address] [facility] [address]  
  
   
 [address] [address]  
  
    
 5/30/2017  9:30 AM  
ESTABLISHED PATIENT with Valeriy Burrell MD  
[facility]) Appt Note: FU  
 [address] [place] [phone] [address] [place] [address] [address] [place] [address] Upcoming Health Maintenance Date Due COLONOSCOPY 8/22/1975 Pneumococcal 19-64 Medium Risk (1 of 1 - PPSV23) 8/22/1976 BREAST CANCER SCRN MAMMOGRAM 6/1/2017 INFLUENZA AGE 9 TO ADULT 8/1/2017 DTaP/Tdap/Td series (2 - Td) 10/30/2025 Allergies as of 5/2/2017  Review Complete On: 5/2/2017 By: Lyndsay Dawn MD  
 No Known Allergies Current Immunizations  Reviewed on 5/2/2017 Name Date Influenza Vaccine 11/5/2014 Influenza Vaccine Sophiamaia Simon) 10/30/2015 10:27 AM  
 Tdap 10/30/2015 10:27 AM  
  
 Reviewed by Lyndsay Dawn MD on 5/2/2017 at 10:09 AM  
You Were Diagnosed With   
  
 Codes Comments Benign essential hypertension    -  Primary ICD-10-CM: I10 
ICD-9-CM: 401.1 Caffeine dependence (HCC)     ICD-10-CM: A14.50 ICD-9-CM: 304.40 Tobacco dependence     ICD-10-CM: L70.736 ICD-9-CM: 305.1 Encounter for screening mammogram for breast cancer     ICD-10-CM: Z12.31 
ICD-9-CM: V76.12 Need for vaccination with PVAX (pneumococcal vaccination)     ICD-10-CM: Q49 ICD-9-CM: V03.82   
 Colon cancer screening     ICD-10-CM: Z12.11 ICD-9-CM: V76.51 Adult BMI <19 kg/sq m     ICD-10-CM: Z68.1 ICD-9-CM: V85.0 Vitals BP Pulse Temp Resp Height(growth percentile) Weight(growth percentile) 120/60 (BP 1 Location: Left arm, BP Patient Position: Sitting) 80 97.7 °F (36.5 °C) (Oral) 16 5' 5\" (1.651 m) 104 lb 8 oz (47.4 kg) SpO2 BMI OB Status Smoking Status 98% 17.39 kg/m2 Hysterectomy Current Every Day Smoker Vitals History BMI and BSA Data Body Mass Index Body Surface Area  
 17.39 kg/m 2 1.47 m 2 Preferred Pharmacy Pharmacy Name Phone [facility], [place]. [phone] Your Updated Medication List  
  
   
This list is accurate as of: 5/2/17 10:16 AM.  Always use your most recent med list.  
  
  
  
  
 dextroamphetamine-amphetamine 20 mg tablet Commonly known as:  ADDERALL Take 1 Tab (20 mg total) by mouth two (2) times a dayIndications: ATTENTION-DEFICIT HYPERACTIVITY DISORDER Earliest Fill Date: 4/27/17. Max Daily Amount: 40 mg FLUoxetine 20 mg capsule Commonly known as:  PROzac Take 1 capsule daily in the morning  Indications: ANXIETY WITH DEPRESSION  
  
 lisinopril 10 mg tablet Commonly known as:  Roxianne Daughters Take 1 Tab by mouth daily. triamterene-hydroCHLOROthiazide 37.5-25 mg per capsule Commonly known as:  Alba Kaka TAKE ONE CAPSULE BY MOUTH DAILY FOR HYPERTENSION  Indications: hypertension Prescriptions Sent to Pharmacy Refills  
 triamterene-hydroCHLOROthiazide (DYAZIDE) 37.5-25 mg per capsule 1 Sig: TAKE ONE CAPSULE BY MOUTH DAILY FOR HYPERTENSION  Indications: hypertension Class: Normal  
 Pharmacy: [facility], [place]. Ph #: [phone]  
 lisinopril (PRINIVIL, ZESTRIL) 10 mg tablet 1 Sig: Take 1 Tab by mouth daily. Class: Normal  
 Pharmacy: [facility] [address] [facility], [address] [place].  #: [phone] Route: Oral  
  
We Performed the Following REFERRAL FOR COLONOSCOPY [JXF990 Custom] Follow-up Instructions Return in about 6 months (around 11/2/2017) for Establish care with new provider for hypertension. To-Do List   
 05/03/2017 Imaging:  GAGE MAMMO BI SCREENING INCL CAD Referral Information Referral ID Referred By Referred To  
  
 [id number] Rustam Pratt MD   
   [address] [facility] 030 66 62 83 Audrey Burciaga, [address] Phone: [phone] Fax: [phone] Visits Status Start Date End Date 1 New Request 5/2/17 5/2/18 If your referral has a status of pending review or denied, additional information will be sent to support the outcome of this decision. Patient Instructions Pneumococcal Polysaccharide Vaccine: Care Instructions Your Care Instructions The pneumococcal polysaccharide vaccine (PPSV) can prevent some of the serious complications of pneumonia. This includes infection in the bloodstream (bacteremia) or throughout the body (septicemia). PPSV is recommended for people ages 72 years and older. People ages 3 to 59 who have a long-term illness should also get the vaccine. This includes people with diabetes, heart disease, liver disease, or lung disease. PPSV can also help people who have a weakened immune system. This includes cancer patients and people who don't have a spleen. The immune system helps your body fight infection and other illnesses. PPSV is given as a shot. It's usually given in the arm. Healthy older adults get the shot once. Other people may need to have a second dose. The shot may cause pain and redness at the site. It may also cause a mild fever for a short time. Follow-up care is a key part of your treatment and safety.  Be sure to make and go to all appointments, and call your doctor if you are having problems. It's also a good idea to know your test results and keep a list of the medicines you take. How can you care for yourself at home? · Take an over-the-counter pain medicine, such as acetaminophen (Tylenol), ibuprofen (Advil, Motrin), or naproxen (Aleve), if your arm is sore after the shot. Be safe with medicines. Read and follow all instructions on the label. · Give acetaminophen (Tylenol) or ibuprofen (Advil, Motrin) to your child for pain or fussiness after the shot. Read and follow all instructions on the label. Do not give aspirin to anyone younger than 20. It has been linked to Reye syndrome, a serious illness. · Put ice or a cold pack on the sore area for 10 to 20 minutes at a time. Put a thin cloth between the ice and your skin. When should you call for help? Call 911 anytime you think you may need emergency care. For example, call if: 
· You have severe problems breathing or swallowing. · You have a seizure. Call your doctor now or seek immediate medical care if: 
· You get hives. · You have a high fever. · You have any unusual reaction after getting the shot. Watch closely for changes in your health, and be sure to contact your doctor if you have any problems. Where can you learn more? Go to http://farooq-heather.info/. Enter T225 in the search box to learn more about \"Pneumococcal Polysaccharide Vaccine: Care Instructions. \" Current as of: February 9, 2016 Content Version: 11.2 © 6078-2493 [employer]. Care instructions adapted under license by [employer] (which disclaims liability or warranty for this information). If you have questions about a medical condition or this instruction, always ask your healthcare professional. [address] any warranty or liability for your use of this information. Introducing [facility]! Chuck Holman introduces Traxo patient portal. Now you can access parts of your medical record, email your doctor's office, and request medication refills online. 1. In your internet browser, go to [email or web address] 2. Click on the First Time User? Click Here link in the Sign In box. You will see the New Member Sign Up page. 3. Enter your Traxo Access Code exactly as it appears below. You will not need to use this code after youve completed the sign-up process. If you do not sign up before the expiration date, you must request a new code. · Traxo Access Code: [email or web address] Expires: 6/26/2017  9:23 AM 
 
4. Enter the last four digits of your Social Security Number (xxxx) and Date of Birth (mm/dd/yyyy) as indicated and click Submit. You will be taken to the next sign-up page. 5. Create a Traxo ID. This will be your Traxo login ID and cannot be changed, so think of one that is secure and easy to remember. 6. Create a Traxo password. You can change your password at any time. 7. Enter your Password Reset Question and Answer. This can be used at a later time if you forget your password. 8. Enter your e-mail address. You will receive e-mail notification when new information is available in [address] 9. Click Sign Up. You can now view and download portions of your medical record. 10. Click the Download Summary menu link to download a portable copy of your medical information. If you have questions, please visit the Frequently Asked Questions section of the Traxo website. Remember, Traxo is NOT to be used for urgent needs. For medical emergencies, dial 911. Now available from your iPhone and Android! Please provide this summary of care documentation to your next provider. Your primary care clinician is listed as [address] If you have any questions after today's visit, please call [phone]. 5-Fu Counseling: 5-Fluorouracil Counseling:  I discussed with the patient the risks of 5-fluorouracil including but not limited to erythema, scaling, itching, weeping, crusting, and pain.

## 2021-03-22 DIAGNOSIS — M19.012 OSTEOARTHRITIS OF LEFT SHOULDER, UNSPECIFIED OSTEOARTHRITIS TYPE: Primary | ICD-10-CM

## 2021-04-08 ENCOUNTER — TELEPHONE (OUTPATIENT)
Dept: RHEUMATOLOGY | Age: 66
End: 2021-04-08

## 2021-04-08 RX ORDER — HYDROXYCHLOROQUINE SULFATE 200 MG/1
TABLET, FILM COATED ORAL
Qty: 45 TAB | Refills: 0 | Status: SHIPPED
Start: 2021-04-08 | End: 2021-12-27

## 2021-04-08 RX ORDER — ALENDRONATE SODIUM 70 MG/1
TABLET ORAL
Qty: 12 TAB | Refills: 3 | Status: SHIPPED
Start: 2021-04-08 | End: 2021-12-27

## 2021-04-08 NOTE — TELEPHONE ENCOUNTER
Returned call to the patient, and used 2 identifiers. Patient wants to see DR. Kendra Vidal for her shoulder pain, and get an x-ray. Message sent to the [facility] to schedule appointment.

## 2021-04-12 ENCOUNTER — HOSPITAL ENCOUNTER (OUTPATIENT)
Dept: GENERAL RADIOLOGY | Age: 66
Discharge: HOME | End: 2021-04-12
Attending: PEDIATRICS
Payer: MEDICARE

## 2021-04-12 DIAGNOSIS — M19.012 OSTEOARTHRITIS OF LEFT SHOULDER, UNSPECIFIED OSTEOARTHRITIS TYPE: ICD-10-CM

## 2021-04-12 PROCEDURE — 73030 X-RAY EXAM OF SHOULDER: CPT

## 2021-04-13 ENCOUNTER — OFFICE VISIT (OUTPATIENT)
Dept: RHEUMATOLOGY | Age: 66
End: 2021-04-13
Payer: MEDICARE

## 2021-04-13 VITALS
BODY MASS INDEX: 17.29 KG/M2 | WEIGHT: 107.6 LBS | TEMPERATURE: 97.7 F | HEART RATE: 107 BPM | RESPIRATION RATE: 18 BRPM | DIASTOLIC BLOOD PRESSURE: 88 MMHG | OXYGEN SATURATION: 98 % | HEIGHT: 66 IN | SYSTOLIC BLOOD PRESSURE: 153 MMHG

## 2021-04-13 DIAGNOSIS — M12.812 ROTATOR CUFF ARTHROPATHY OF LEFT SHOULDER: ICD-10-CM

## 2021-04-13 DIAGNOSIS — M05.79 SEROPOSITIVE RHEUMATOID ARTHRITIS OF MULTIPLE SITES (HCC): Primary | ICD-10-CM

## 2021-04-13 PROCEDURE — G8754 DIAS BP LESS 90: HCPCS | Performed by: PEDIATRICS

## 2021-04-13 PROCEDURE — 99214 OFFICE O/P EST MOD 30 MIN: CPT | Performed by: PEDIATRICS

## 2021-04-13 PROCEDURE — 3017F COLORECTAL CA SCREEN DOC REV: CPT | Performed by: PEDIATRICS

## 2021-04-13 PROCEDURE — G0463 HOSPITAL OUTPT CLINIC VISIT: HCPCS | Performed by: PEDIATRICS

## 2021-04-13 PROCEDURE — G9717 DOC PT DX DEP/BP F/U NT REQ: HCPCS | Performed by: PEDIATRICS

## 2021-04-13 PROCEDURE — G8419 CALC BMI OUT NRM PARAM NOF/U: HCPCS | Performed by: PEDIATRICS

## 2021-04-13 PROCEDURE — G8427 DOCREV CUR MEDS BY ELIG CLIN: HCPCS | Performed by: PEDIATRICS

## 2021-04-13 PROCEDURE — G8753 SYS BP > OR = 140: HCPCS | Performed by: PEDIATRICS

## 2021-04-13 RX ORDER — PREDNISONE 5 MG/1
5 TABLET ORAL DAILY
Qty: 30 TAB | Refills: 1 | Status: SHIPPED | OUTPATIENT
Start: 2021-04-13 | End: 2021-05-13

## 2021-04-13 RX ORDER — LEFLUNOMIDE 20 MG/1
20 TABLET ORAL DAILY
Qty: 30 TAB | Refills: 6 | Status: SHIPPED | OUTPATIENT
Start: 2021-04-13 | End: 2021-05-13

## 2021-04-13 RX ORDER — ASPIRIN 81 MG/1
TABLET ORAL DAILY
COMMUNITY
End: 2021-12-27

## 2021-04-13 NOTE — PATIENT INSTRUCTIONS
Prednisone 5mg x 3 weeks Prednisone 2.5mg x 3 weeks Start arava 20mg daily Continue plaquenil 300mg daily

## 2021-04-13 NOTE — PROGRESS NOTES
RHEUMATOLOGY PROBLEM LIST AND CHIEF COMPLAINT  1. Inflammatory arthritis - Joint space narrowing of 2nd and 3rd MCPs, morning stiffness, improvement of joint pain throughout the day, one episode of arthritis in right wrist that responded to prednisone, JOSELITO positive, CCP positive, CRP elevated   2. Osteoarthritis     Therapy history:   Current DMARDs: Plaquenil (11/2018-current), Leflunomide (ordered 4/2021)    INTERVAL HISTORY  This is a 63 y.o.  female. Today, the patient complains of no pain in the joints. Location: shoulder  Severity: 7 on a scale of 0-10   Timing: all day   Duration: 2 months  Modifying Factors: NA  Context/Associated signs and symptoms: The patient's chief complaint is left shoulder pain with a sensation of pins and needles with neck soreness. She treats symptoms with ice. Patient reports continued pain and swelling over her hands. She continues on Plaquenil 300 mg daily, Fosamax 70 mg daily, calcium and vitamin D. X-ray of hands reviewed were normal. Of note, she has received one of two doses of a Covid-19 vaccine.      RHEUMATOLOGY REVIEW OF SYSTEMS   Positives as per history  Negatives as follows:  [place]:  Denies unexplained persistent fevers, weight change, chronic fatigue  HEAD/EYES:   Denies eye redness, blurry vision or sudden loss of vision, dry eyes, HA, temporal artery pain  ENT:    Denies oral/nasal ulcers, recurrent sinus infections, dry mouth  RESPIRATORY:  No pleuritic pain, history of pleural effusions, hemoptysis, exertional dyspnea  CARDIOVASCULAR:  Denies chest pain, history of pericardial effusions  GASTRO:   Denies heartburn, esophageal dysmotility, abdominal pain, nausea, vomiting, diarrhea, blood in the stool  HEMATOLOGIC:  No easy bruising, purpura, swollen lymph nodes  SKIN:    Denies alopecia, ulcers, nodules, sun sensitivity, unexplained persistent rash   VASCULAR:   Denies edema, cyanosis, raynaud phenomenon  NEUROLOGIC:  Denies specific muscle weakness, paresthesias   PSYCHIATRIC:  No sleep disturbance / snoring, depression, anxiety  MSK:    No morning stiffness >1 hour, SI joint pain    PAST MEDICAL HISTORY  Reviewed with patient, significant changes in medical history - no    PHYSICAL EXAM  Blood pressure (!) 153/88, pulse (!) 107, temperature 97.7 °F (36.5 °C), temperature source Oral, resp. rate 18, height 5' 6\" (1.676 m), weight 107 lb 9.6 oz (48.8 kg), SpO2 98 %. GENERAL APPEARANCE: Well-nourished, no acute distress   NECK: No adenopathy  ENT: No oral ulcers  CARDIOVASCULAR: Heart rhythm is regular. No murmur, rub, gallop  CHEST: Normal vesicular breath sounds. No wheezes, rales, pleural friction rubs  ABDOMINAL: The abdomen is soft and nontender. Bowel sounds are normal  SKIN: No rash, palpable purpura, digital ulcer, abnormal thickening   MUSCULOSKELETAL:   Upper extremities - Heberden's and Sebastian's nodes. B/l hand synovial thickening over MCP joints (R>L), no synovitis. Puffiness over b/l 2nd and 3rd MCPs (R>L). Puffiness over 1st and 2nd PIPs. dROM of left shoulder. Lower extremities - R lateral thigh tenderness secondary to trochanteric bursitis - improved. 1/2020          LABS, RADIOLOGY AND PROCEDURES   Previous labs reviewed -Yes    ASSESSMENT  1. Inflammatory arthritis -(has worsened)- The patient has worsened on exam. I recommend she escalate treatment by adding Leflunomide 20 mg daily. She should continue on Plaquenil 300 mg daily. Advised her to start Prednisone 5 mg daily to decrease her current inflammation and then taper by 2.5 mg q3 weeks after her second Covid-19 vaccine. I will order labs to be completed one month after starting Leflunomide. I will order an MRI of her left shoulder to evaluate for a tear. Based on these results we will consider referring patient to orthopedics for management. I will provide a referral for physical therapy. Follow up in 2 months. 2. Osteoarthritis - ROM exercises are recommended.  This was not a concern today. 3. Trochanteric bursitis, right - This was not a concern today. 4. Bone density-The patient's DEXA scan (10/2019) indicated osteopenia, however due to the fragility fracture of the left humerus we will be treating more aggressively. For now she should continue on Fosamax 70 mg daily. This was not a complaint today. 5. New medication - Leflunomide - A written summary, as prepared by the [employer] of Rheumatology was provided. The patient was given the opportunity to ask questions, and verbalized understanding of the following: The most common side effect reported is diarrhea (20% of patients) which improves with time. Less common side effects are nausea, abdominal pain, indigestion, rash, or hair loss. Abnormal LFT's and blood count abnormalities can occur so we will routinely check labs. Rarely pulmonary issues can occur. The patient should not receive live vaccines while receiving leflunomide, should not drink alcohol, should not become pregnant if female. Men and women of child bearing age should know that there is serious birth defects from this drug. It lasts a long time in the body so therefore it is not recommended to anyone who is planning to have children in the near future. PLAN  1. Plaquenil 300 mg daily  2. Fosamax 70 mg daily  3. Prednisone 5 mg daily; taper by 2.5 mg q3 weeks   4. Start Arava 20 mg daily   5. Check CBC, CMP, markers of inflammation (ESR, CRP) - one month after starting Leflunomide   6. PT referral   7. MRI of left shoulder   8. Return in 2 months    Avis Hope MD  Adult and Pediatric Rheumatology     [facility], [address] [place] [address], Phone [phone], Fax [phone]     Visiting  of Pediatrics    Department of Pediatrics, [facility] of [address] [place] [address], [address] [place] [address], Phone [phone], Fax [phone]    There are no Patient Instructions on file for this visit.     cc:  Valentin Ordaz MD    Written by william Du, as dictated by Dr. Dallas Carrion M.D.

## 2021-04-13 NOTE — PROGRESS NOTES
Chief Complaint   Patient presents with    Arthritis     3 most recent PHQ Screens 4/13/2021   PHQ Not Done -   Little interest or pleasure in doing things Not at all   Feeling down, depressed, irritable, or hopeless Not at all   Total Score PHQ 2 0     Abuse Screening Questionnaire 4/13/2021   Do you ever feel afraid of your partner? N   Are you in a relationship with someone who physically or mentally threatens you? N   Is it safe for you to go home? Y     Visit Vitals  BP (!) 153/88 (BP 1 Location: Left arm, BP Patient Position: Sitting, BP Cuff Size: Small adult)   Pulse (!) 107   Temp 97.7 °F (36.5 °C) (Oral)   Resp 18   Ht 5' 6\" (1.676 m)   Wt 107 lb 9.6 oz (48.8 kg)   SpO2 98%   BMI 17.37 kg/m²     1. Have you been to the ER, urgent care clinic since your last visit? Hospitalized since your last visit? 2. Have you seen or consulted any other health care providers outside of the [address] [place] [address] since your last visit? Include any pap smears or colon screening.  no

## 2021-12-21 ENCOUNTER — APPOINTMENT (OUTPATIENT)
Dept: GENERAL RADIOLOGY | Age: 66
DRG: 481 | End: 2021-12-21
Attending: EMERGENCY MEDICINE
Payer: MEDICARE

## 2021-12-21 ENCOUNTER — APPOINTMENT (OUTPATIENT)
Dept: GENERAL RADIOLOGY | Age: 66
DRG: 481 | End: 2021-12-21
Attending: FAMILY MEDICINE
Payer: MEDICARE

## 2021-12-21 ENCOUNTER — HOSPITAL ENCOUNTER (INPATIENT)
Age: 66
LOS: 6 days | Discharge: NURSING, REHAB OR LONG-TERM CARE | DRG: 481 | End: 2021-12-27
Attending: EMERGENCY MEDICINE | Admitting: FAMILY MEDICINE
Payer: MEDICARE

## 2021-12-21 DIAGNOSIS — S72.002A CLOSED FRACTURE OF LEFT HIP, INITIAL ENCOUNTER (HCC): Primary | ICD-10-CM

## 2021-12-21 PROBLEM — S72.009A HIP FRACTURE (HCC): Status: ACTIVE | Noted: 2021-12-21

## 2021-12-21 LAB
ABO + RH BLD: NORMAL
ALBUMIN SERPL-MCNC: 3.8 G/DL (ref 3.5–5)
ALBUMIN/GLOB SERPL: 1.4 {RATIO} (ref 1.1–2.2)
ALP SERPL-CCNC: 52 U/L (ref 45–117)
ALT SERPL-CCNC: 23 U/L (ref 12–78)
ANION GAP SERPL CALC-SCNC: 7 MMOL/L (ref 5–15)
APPEARANCE UR: CLEAR
AST SERPL-CCNC: 17 U/L (ref 15–37)
BACTERIA URNS QL MICRO: NEGATIVE /HPF
BASOPHILS # BLD: 0 K/UL (ref 0–0.1)
BASOPHILS NFR BLD: 0 % (ref 0–1)
BILIRUB SERPL-MCNC: 0.5 MG/DL (ref 0.2–1)
BILIRUB UR QL: NEGATIVE
BLOOD GROUP ANTIBODIES SERPL: NORMAL
BUN SERPL-MCNC: 10 MG/DL (ref 6–20)
BUN/CREAT SERPL: 22 (ref 12–20)
CALCIUM SERPL-MCNC: 8.5 MG/DL (ref 8.5–10.1)
CHLORIDE SERPL-SCNC: 101 MMOL/L (ref 97–108)
CO2 SERPL-SCNC: 24 MMOL/L (ref 21–32)
COLOR UR: ABNORMAL
COMMENT, HOLDF: NORMAL
COVID-19 RAPID TEST, COVR: NOT DETECTED
CREAT SERPL-MCNC: 0.46 MG/DL (ref 0.55–1.02)
DIFFERENTIAL METHOD BLD: ABNORMAL
EOSINOPHIL # BLD: 0 K/UL (ref 0–0.4)
EOSINOPHIL NFR BLD: 0 % (ref 0–7)
EPITH CASTS URNS QL MICRO: ABNORMAL /LPF
ERYTHROCYTE [DISTWIDTH] IN BLOOD BY AUTOMATED COUNT: 11.8 % (ref 11.5–14.5)
GLOBULIN SER CALC-MCNC: 2.8 G/DL (ref 2–4)
GLUCOSE BLD STRIP.AUTO-MCNC: 126 MG/DL (ref 65–117)
GLUCOSE SERPL-MCNC: 89 MG/DL (ref 65–100)
GLUCOSE UR STRIP.AUTO-MCNC: NEGATIVE MG/DL
HCT VFR BLD AUTO: 34.7 % (ref 35–47)
HGB BLD-MCNC: 11.7 G/DL (ref 11.5–16)
HGB UR QL STRIP: NEGATIVE
HYALINE CASTS URNS QL MICRO: ABNORMAL /LPF (ref 0–5)
IMM GRANULOCYTES # BLD AUTO: 0.1 K/UL (ref 0–0.04)
IMM GRANULOCYTES NFR BLD AUTO: 1 % (ref 0–0.5)
INR PPP: 1 (ref 0.9–1.1)
KETONES UR QL STRIP.AUTO: 15 MG/DL
LEUKOCYTE ESTERASE UR QL STRIP.AUTO: NEGATIVE
LYMPHOCYTES # BLD: 0.9 K/UL (ref 0.8–3.5)
LYMPHOCYTES NFR BLD: 9 % (ref 12–49)
MCH RBC QN AUTO: 33.1 PG (ref 26–34)
MCHC RBC AUTO-ENTMCNC: 33.7 G/DL (ref 30–36.5)
MCV RBC AUTO: 98.3 FL (ref 80–99)
MONOCYTES # BLD: 0.7 K/UL (ref 0–1)
MONOCYTES NFR BLD: 7 % (ref 5–13)
NEUTS SEG # BLD: 8.4 K/UL (ref 1.8–8)
NEUTS SEG NFR BLD: 83 % (ref 32–75)
NITRITE UR QL STRIP.AUTO: NEGATIVE
NRBC # BLD: 0 K/UL (ref 0–0.01)
NRBC BLD-RTO: 0 PER 100 WBC
PH UR STRIP: 6 [PH] (ref 5–8)
PLATELET # BLD AUTO: 218 K/UL (ref 150–400)
PMV BLD AUTO: 8.6 FL (ref 8.9–12.9)
POTASSIUM SERPL-SCNC: 3.5 MMOL/L (ref 3.5–5.1)
PROT SERPL-MCNC: 6.6 G/DL (ref 6.4–8.2)
PROT UR STRIP-MCNC: NEGATIVE MG/DL
PROTHROMBIN TIME: 10.5 SEC (ref 9–11.1)
RBC # BLD AUTO: 3.53 M/UL (ref 3.8–5.2)
RBC #/AREA URNS HPF: ABNORMAL /HPF (ref 0–5)
SAMPLES BEING HELD,HOLD: NORMAL
SERVICE CMNT-IMP: ABNORMAL
SODIUM SERPL-SCNC: 132 MMOL/L (ref 136–145)
SOURCE, COVRS: NORMAL
SP GR UR REFRACTOMETRY: 1.01 (ref 1–1.03)
SPECIMEN EXP DATE BLD: NORMAL
UA: UC IF INDICATED,UAUC: ABNORMAL
UROBILINOGEN UR QL STRIP.AUTO: 0.2 EU/DL (ref 0.2–1)
WBC # BLD AUTO: 10.1 K/UL (ref 3.6–11)
WBC URNS QL MICRO: ABNORMAL /HPF (ref 0–4)

## 2021-12-21 PROCEDURE — 74011250637 HC RX REV CODE- 250/637: Performed by: FAMILY MEDICINE

## 2021-12-21 PROCEDURE — 80053 COMPREHEN METABOLIC PANEL: CPT

## 2021-12-21 PROCEDURE — 87635 SARS-COV-2 COVID-19 AMP PRB: CPT

## 2021-12-21 PROCEDURE — 81001 URINALYSIS AUTO W/SCOPE: CPT

## 2021-12-21 PROCEDURE — 86901 BLOOD TYPING SEROLOGIC RH(D): CPT

## 2021-12-21 PROCEDURE — 73502 X-RAY EXAM HIP UNI 2-3 VIEWS: CPT

## 2021-12-21 PROCEDURE — 71045 X-RAY EXAM CHEST 1 VIEW: CPT

## 2021-12-21 PROCEDURE — 85610 PROTHROMBIN TIME: CPT

## 2021-12-21 PROCEDURE — 96374 THER/PROPH/DIAG INJ IV PUSH: CPT

## 2021-12-21 PROCEDURE — 93005 ELECTROCARDIOGRAM TRACING: CPT

## 2021-12-21 PROCEDURE — 65270000029 HC RM PRIVATE

## 2021-12-21 PROCEDURE — 77030005513 HC CATH URETH FOL11 MDII -B

## 2021-12-21 PROCEDURE — 99285 EMERGENCY DEPT VISIT HI MDM: CPT

## 2021-12-21 PROCEDURE — 85025 COMPLETE CBC W/AUTO DIFF WBC: CPT

## 2021-12-21 PROCEDURE — 74011250636 HC RX REV CODE- 250/636: Performed by: EMERGENCY MEDICINE

## 2021-12-21 PROCEDURE — 36415 COLL VENOUS BLD VENIPUNCTURE: CPT

## 2021-12-21 PROCEDURE — 73562 X-RAY EXAM OF KNEE 3: CPT

## 2021-12-21 PROCEDURE — 82962 GLUCOSE BLOOD TEST: CPT

## 2021-12-21 PROCEDURE — 2709999900 HC NON-CHARGEABLE SUPPLY

## 2021-12-21 PROCEDURE — 73560 X-RAY EXAM OF KNEE 1 OR 2: CPT

## 2021-12-21 PROCEDURE — 74011250636 HC RX REV CODE- 250/636: Performed by: FAMILY MEDICINE

## 2021-12-21 PROCEDURE — 77030036660

## 2021-12-21 RX ORDER — MORPHINE SULFATE 2 MG/ML
1 INJECTION, SOLUTION INTRAMUSCULAR; INTRAVENOUS
Status: DISCONTINUED | OUTPATIENT
Start: 2021-12-21 | End: 2021-12-25

## 2021-12-21 RX ORDER — DIAZEPAM 10 MG/2ML
2.5 INJECTION INTRAMUSCULAR
Status: COMPLETED | OUTPATIENT
Start: 2021-12-21 | End: 2021-12-21

## 2021-12-21 RX ORDER — OXYCODONE HYDROCHLORIDE 5 MG/1
5 TABLET ORAL
Status: DISCONTINUED | OUTPATIENT
Start: 2021-12-21 | End: 2021-12-27 | Stop reason: HOSPADM

## 2021-12-21 RX ORDER — NALOXONE HYDROCHLORIDE 0.4 MG/ML
0.4 INJECTION, SOLUTION INTRAMUSCULAR; INTRAVENOUS; SUBCUTANEOUS AS NEEDED
Status: DISCONTINUED | OUTPATIENT
Start: 2021-12-21 | End: 2021-12-22 | Stop reason: SDUPTHER

## 2021-12-21 RX ORDER — ATORVASTATIN CALCIUM 20 MG/1
20 TABLET, FILM COATED ORAL DAILY
COMMUNITY

## 2021-12-21 RX ORDER — SERTRALINE HYDROCHLORIDE 50 MG/1
50 TABLET, FILM COATED ORAL DAILY
Status: DISCONTINUED | OUTPATIENT
Start: 2021-12-22 | End: 2021-12-27 | Stop reason: HOSPADM

## 2021-12-21 RX ORDER — MORPHINE SULFATE 4 MG/ML
4 INJECTION INTRAVENOUS
Status: COMPLETED | OUTPATIENT
Start: 2021-12-21 | End: 2021-12-21

## 2021-12-21 RX ORDER — LISINOPRIL 10 MG/1
10 TABLET ORAL DAILY
Status: DISCONTINUED | OUTPATIENT
Start: 2021-12-22 | End: 2021-12-27 | Stop reason: HOSPADM

## 2021-12-21 RX ORDER — SODIUM CHLORIDE 0.9 % (FLUSH) 0.9 %
5-40 SYRINGE (ML) INJECTION EVERY 8 HOURS
Status: DISCONTINUED | OUTPATIENT
Start: 2021-12-21 | End: 2021-12-27 | Stop reason: HOSPADM

## 2021-12-21 RX ORDER — SODIUM CHLORIDE 0.9 % (FLUSH) 0.9 %
5-40 SYRINGE (ML) INJECTION AS NEEDED
Status: DISCONTINUED | OUTPATIENT
Start: 2021-12-21 | End: 2021-12-27 | Stop reason: HOSPADM

## 2021-12-21 RX ORDER — SODIUM CHLORIDE 9 MG/ML
75 INJECTION, SOLUTION INTRAVENOUS CONTINUOUS
Status: DISCONTINUED | OUTPATIENT
Start: 2021-12-21 | End: 2021-12-25

## 2021-12-21 RX ADMIN — MORPHINE SULFATE 1 MG: 2 INJECTION, SOLUTION INTRAMUSCULAR; INTRAVENOUS at 19:48

## 2021-12-21 RX ADMIN — MORPHINE SULFATE 4 MG: 4 INJECTION INTRAVENOUS at 10:08

## 2021-12-21 RX ADMIN — Medication 10 ML: at 16:34

## 2021-12-21 RX ADMIN — OXYCODONE HYDROCHLORIDE 5 MG: 5 TABLET ORAL at 21:57

## 2021-12-21 RX ADMIN — Medication 10 ML: at 22:00

## 2021-12-21 RX ADMIN — DIAZEPAM 2.5 MG: 5 INJECTION, SOLUTION INTRAMUSCULAR; INTRAVENOUS at 16:34

## 2021-12-21 RX ADMIN — MORPHINE SULFATE 1 MG: 2 INJECTION, SOLUTION INTRAMUSCULAR; INTRAVENOUS at 23:50

## 2021-12-21 RX ADMIN — MORPHINE SULFATE 1 MG: 2 INJECTION, SOLUTION INTRAMUSCULAR; INTRAVENOUS at 14:52

## 2021-12-21 RX ADMIN — SODIUM CHLORIDE 75 ML/HR: 9 INJECTION, SOLUTION INTRAVENOUS at 21:00

## 2021-12-21 NOTE — ED NOTES
Patient placed on bedpan to urinate, complaining of increased leg pain. Patient placed on purwick for comfort.

## 2021-12-21 NOTE — ED PROVIDER NOTES
Ms. Jaycee Maria is a 58yo female who presents to the ER with complaints of left hip pain. She said that she was up at 2 AM watching Netflix. She went to go walk to the bathroom and stepped on her shoelace. She fell onto her left side. Initially, she just had pain in her left knee. She got to bed and woke up in the morning with significant pain left knee and left hip. She was unable to walk the leg due to pain in the hip. She did not hit her head. She did not lose consciousness. She felt well prior to the fall. No fevers or chills. No nausea or vomiting. She denies any other complaints. Past Medical History:   Diagnosis Date    ADD (attention deficit disorder)     Arthritis     AVM (arteriovenous malformation) brain 2009    CVA (cerebral infarction) 2009    Hypertension     Inattention        Past Surgical History:   Procedure Laterality Date    HX HEENT      HX HYSTERECTOMY      HX ORTHOPAEDIC      NEUROLOGICAL PROCEDURE UNLISTED      anurism surgery, AVM malformatiom         Family History:   Problem Relation Age of Onset    Hypertension Mother     Elevated Lipids Mother     Diabetes Father     Heart Attack Maternal Grandmother     Diabetes Paternal Grandmother     Diabetes Paternal Grandfather        Social History     Socioeconomic History    Marital status:      Spouse name: Not on file    Number of children: Not on file    Years of education: Not on file    Highest education level: Not on file   Occupational History    Not on file   Tobacco Use    Smoking status: Current Every Day Smoker     Packs/day: 0.50     Years: 16.00     Pack years: 8.00    Smokeless tobacco: Never Used   Vaping Use    Vaping Use: Never used   Substance and Sexual Activity    Alcohol use:  Yes     Alcohol/week: 2.0 standard drinks     Types: 1 Glasses of wine, 1 Cans of beer per week    Drug use: No    Sexual activity: Not Currently   Other Topics Concern     Service Not Asked    Blood Transfusions Not Asked    Caffeine Concern Not Asked    Occupational Exposure Not Asked    Hobby Hazards Not Asked    Sleep Concern Not Asked    Stress Concern Not Asked    Weight Concern Not Asked    Special Diet Not Asked    Back Care Not Asked    Exercise Not Asked    Bike Helmet Not Asked   [address] Not Asked    Self-Exams Not Asked   Social History Narrative    , her ex  has passed away, one son in [place], two daughters- one in [place] and one in [place]. Social Determinants of Health     Financial Resource Strain:     Difficulty of Paying Living Expenses: Not on file   Food Insecurity:     Worried About Running Out of Food in the Last Year: Not on file    Leidy of Food in the Last Year: Not on file   Transportation Needs:     Lack of Transportation (Medical): Not on file    Lack of Transportation (Non-Medical): Not on file   Physical Activity:     Days of Exercise per Week: Not on file    Minutes of Exercise per Session: Not on file   Stress:     Feeling of Stress : Not on file   Social Connections:     Frequency of Communication with Friends and Family: Not on file    Frequency of Social Gatherings with Friends and Family: Not on file    Attends Sabianist Services: Not on file    Active Member of [address] [place] [address] "[employer] or Organizations: Not on file    Attends Club or Organization Meetings: Not on file    Marital Status: Not on file   Intimate Partner Violence:     Fear of Current or Ex-Partner: Not on file    Emotionally Abused: Not on file    Physically Abused: Not on file    Sexually Abused: Not on file   Housing Stability:     Unable to Pay for Housing in the Last Year: Not on file    Number of [place] in the Last Year: Not on file    Unstable Housing in the Last Year: Not on file         ALLERGIES: Patient has no known allergies. Review of Systems   Constitutional: Negative for chills and fever. HENT: Negative for rhinorrhea and sore throat. Respiratory: Negative for cough and shortness of breath. Cardiovascular: Negative for chest pain. Gastrointestinal: Negative for abdominal pain, diarrhea, nausea and vomiting. Genitourinary: Negative for dysuria and urgency. Musculoskeletal:        Hip pain   Skin: Negative for rash. Neurological: Negative for dizziness, weakness and light-headedness. Vitals:    12/21/21 0850   BP: (!) 152/88   Pulse: 62   Resp: 18   Temp: 98.5 °F (36.9 °C)   SpO2: 95%            Physical Exam     Vital signs reviewed. Nursing notes reviewed. Const:  No acute distress, well developed, well nourished  Head:  Atraumatic, normocephalic  Eyes:  PERRL, conjunctiva normal, no scleral icterus  Neck:  Supple, trachea midline  Cardiovascular: Regular rate  Resp:  No resp distress, no increased work of breathing  Abd:  Soft, non-tender, non-distended  MSK:  No pedal edema, normal ROM, pain with palpation of the left hip, pain with range of motion of the left hip, no tenderness palpation of the left knee  Neuro:  Alert and oriented x3, no cranial nerve defect  Skin:  Warm, dry, intact  Psych: normal mood and affect, behavior is normal, judgement and thought content is normal          MDM  Number of Diagnoses or Management Options     Amount and/or Complexity of Data Reviewed  Clinical lab tests: ordered and reviewed  Tests in the radiology section of CPT®: ordered and reviewed  Review and summarize past medical records: yes    Patient Progress  Patient progress: stable         Procedures      Perfect Serve Consult for Admission  10:42 AM    ED Room Number: ER09/09  Patient Name and age:  Lachelle Correia 61 y.o.  female  Working Diagnosis:   1.  Closed fracture of left hip, initial encounter ([facility] [address]        COVID-19 Suspicion:  no  Sepsis present:  no  Reassessment needed: no  Readmission: no  Isolation Requirements:  no  Recommended Level of Care:  med/surg  Department:[facility] Adult ED - 21   Other:  Ortho has been consulted       MsLavonne Tyrone Pruitt is a 60yo female who presents to the ER with complaints of hip pain after a fall. No other injuries reported. The fall sounds like a mechanical fall. Xray shows fx. Ortho has been consulted. Pt.  To be evaluated for admission by the hospitalist.

## 2021-12-21 NOTE — ED TRIAGE NOTES
TRIAGE NOTE:  Patient fell around 2am while walking to bed. She got herself up and went to bed, she woke up around 4pm in pain and noticed her left knee and hip where hurting more. She started banging on the wall and her neighbors responded around 8am and called 911. EMS administered 100mcg of fentanyl PTA.

## 2021-12-21 NOTE — CONSULTS
Geriatric Fracture Consult  Patient: Melvin Thurman  YOB: 1955   MRN: [id number]      Consult Date: 12/21/2021     Chief Complaint: Left hip pain  ED Presentation Time: < 8 hours  Mechanism of Injury: Fall from standing - tripped over her shoelaces around 0200 12/21/21  Ambulatory Status: Independent  Past Medical History:   Past Medical History:   Diagnosis Date    ADD (attention deficit disorder)     Arthritis     AVM (arteriovenous malformation) brain 2009    CVA (cerebral infarction) 2009    Hypertension     Inattention        Allergies: No Known Allergies   Past Surgical History:   Past Surgical History:   Procedure Laterality Date    HX HEENT      HX HYSTERECTOMY      HX ORTHOPAEDIC      NEUROLOGICAL PROCEDURE UNLISTED      anurism surgery, AVM malformatiom      Social History:   Social History     Socioeconomic History    Marital status:      Spouse name: Not on file    Number of children: Not on file    Years of education: Not on file    Highest education level: Not on file   Occupational History    Not on file   Tobacco Use    Smoking status: Current Every Day Smoker     Packs/day: 0.50     Years: 16.00     Pack years: 8.00    Smokeless tobacco: Never Used   Vaping Use    Vaping Use: Never used   Substance and Sexual Activity    Alcohol use:  Yes     Alcohol/week: 2.0 standard drinks     Types: 1 Glasses of wine, 1 Cans of beer per week    Drug use: No    Sexual activity: Not Currently   Other Topics Concern     Service Not Asked    Blood Transfusions Not Asked    Caffeine Concern Not Asked    Occupational Exposure Not Asked    Hobby Hazards Not Asked    Sleep Concern Not Asked    Stress Concern Not Asked    Weight Concern Not Asked    Special Diet Not Asked    Back Care Not Asked    Exercise Not Asked    Bike Helmet Not Asked   [address] Not Asked    Self-Exams Not Asked   Social History Narrative    , her ex  has passed away, one son in [place], two daughters- one in [place] and one in [place]. Social Determinants of Health     Financial Resource Strain:     Difficulty of Paying Living Expenses: Not on file   Food Insecurity:     Worried About Running Out of Food in the Last Year: Not on file    Leidy of Food in the Last Year: Not on file   Transportation Needs:     Lack of Transportation (Medical): Not on file    Lack of Transportation (Non-Medical): Not on file   Physical Activity:     Days of Exercise per Week: Not on file    Minutes of Exercise per Session: Not on file   Stress:     Feeling of Stress : Not on file   Social Connections:     Frequency of Communication with Friends and Family: Not on file    Frequency of Social Gatherings with Friends and Family: Not on file    Attends Hindu Services: Not on file    Active Member of [address] [place] [address] [employer] or Organizations: Not on file    Attends Club or Organization Meetings: Not on file    Marital Status: Not on file   Intimate Partner Violence:     Fear of Current or Ex-Partner: Not on file    Emotionally Abused: Not on file    Physically Abused: Not on file    Sexually Abused: Not on file   Housing Stability:     Unable to Pay for Housing in the Last Year: Not on file    Number of [place] in the Last Year: Not on file    Unstable Housing in the Last Year: Not on file      Dwelling Status: Alone  Current Anticoagulant Medications: none  History of falls: NO  Prior Fractures: none    Labs:    Lab Results   Component Value Date/Time    HGB 11.7 12/21/2021 08:58 AM    WBC 10.1 12/21/2021 08:58 AM    Albumin 3.8 12/21/2021 08:58 AM     X-RAYS:   XR HIP LT W OR WO PELV 2-3 VWS 12/21/2021 09:57  INDICATION: fall. Acute left hip pain  COMPARISON: None. FINDINGS: AP view of the pelvis and a frogleg lateral view of the left hip  demonstrate a nondisplaced left intertrochanteric hip fracture.   IMPRESSION  Nondisplaced left intertrochanteric hip fracture    Physical Exam:   General: 58yo female, pleasant/cooperative, no distress, appears stated age  CNS:alert/oriented, normal mood  Vascular: pedal pulses normal and no edema   Skin: no rash or abnormalities  Extremities: no obvious leg length discrepancy, left leg externally rotated, pain with passive ROM left hip  Neurologic: moving foot/ankle normally, no acute sensory deficit in lower extremities    Assessment:    Plan: Discussed with attending orthopedist, Dr. Petrona Abreu. Will need surgical repair. Plan for left hip gamma nail this afternoon. Discussed surgery in detail and expected hospital course and recovery. She expressed understanding and is anxious to proceed. Consent completed, signed, and scanned into chart. Rapid COVID-19 test ordered. OR notified.  Keep NPO       Signed by: LARRY Casas   Today's Date: December 21, 2021

## 2021-12-21 NOTE — ROUTINE PROCESS
TRANSFER - OUT REPORT:    Verbal report given to 5S RN(name) on Millie Beckford  being transferred to [address] [place] [address]  (unit) for routine progression of care       Report consisted of patients Situation, Background, Assessment and   Recommendations(SBAR). Information from the following report(s) SBAR, ED Summary, Intake/Output, MAR and Recent Results was reviewed with the receiving nurse. Lines:   Peripheral IV 12/21/21 Left Antecubital (Active)   Site Assessment Clean, dry, & intact 12/21/21 0904   Phlebitis Assessment 0 12/21/21 0904   Infiltration Assessment 0 12/21/21 0904   Dressing Status Clean, dry, & intact 12/21/21 0904   Dressing Type Transparent 12/21/21 0904   Hub Color/Line Status Green 12/21/21 0904   Action Taken Blood drawn 12/21/21 0904   Alcohol Cap Used No 12/21/21 7630        Opportunity for questions and clarification was provided.       Patient transported with:   [employer]

## 2021-12-21 NOTE — H&P
History & Physical    Primary Care Provider: Elvin Fu MD  Source of Information: Patient     History of Presenting Illness:   Debbie Harden is a 61 y.o. female who presents with hip pain    Patient reports that she was watching Netflix felt warm this morning, got up to go to the bathroom, and stepped on her shoelace, tripped and fell, started having some left knee pain and left hip pain, woke up this morning and had significant left hip pain to the point where she could not move, got concerned came to the ER, patient was found to have a femoral fracture and was requested to be admitted to the hospital service, patient denies any loss of consciousness    The patient denies any Headache, blurry vision, sore throat, trouble swallowing, trouble with speech, chest pain, SOB, cough, fever, chills, N/V/D, abd pain, urinary symptoms, constipation, recent travels, sick contacts, focal or generalized neurological symptoms, , rashes, contact with COVID-19 diagnosed patients, hematemesis, melena, hemoptysis, hematuria, rashes, denies starting any new medications and denies any other concerns or problems besides as mentioned above. Review of Systems:  A comprehensive review of systems was negative except for that written in the History of Present Illness. All other systems reviewed, pertinent positives and negatives noted in HPI    Past Medical History:   Diagnosis Date    ADD (attention deficit disorder)     Arthritis     AVM (arteriovenous malformation) brain 2009    CVA (cerebral infarction) 2009    Hypertension     Inattention       Past Surgical History:   Procedure Laterality Date    HX HEENT      HX HYSTERECTOMY      HX ORTHOPAEDIC      NEUROLOGICAL PROCEDURE UNLISTED      anurism surgery, AVM malformatiom     Prior to Admission medications    Medication Sig Start Date End Date Taking?  Authorizing Provider   atorvastatin (Lipitor) 20 mg tablet Take 20 mg by mouth daily. Yes Other, MD Teodoro   aspirin delayed-release 81 mg tablet Take  by mouth daily. Yes Provider, Historical   sertraline (ZOLOFT) 50 mg tablet Take 1 Tab by mouth daily. Refills must come from psych 1/9/20  Yes Roly Vasquez MD   lisinopril (PRINIVIL, ZESTRIL) 10 mg tablet TAKE 1 TABLET BY MOUTH ONCE DAILY 12/26/19  Yes Dawn Meek NP   triamterene-hydroCHLOROthiazide (DYAZIDE) 37.5-25 mg per capsule TAKE ONE CAPSULE BY MOUTH DAILY FOR HYPERTENSION  Indications: high blood pressure 6/28/19  Yes Judge Neto NP   alendronate (FOSAMAX) 70 mg tablet TAKE ONE TABLET BY MOUTH ONCE WEEKLY  Patient not taking: Reported on 12/21/2021 4/8/21   Kylah Pope MD   hydrOXYchloroQUINE (PLAQUENIL) 200 mg tablet TAKE 1 AND 1/2 TABLET BY MOUTH DAILY  Patient not taking: Reported on 12/21/2021 4/8/21   Kylah Pope MD   predniSONE (DELTASONE) 5 mg tablet 20mg x 3 days, 15mg x 3 days, 10mg x 3 days, 5mg x 3 days  Patient not taking: Reported on 12/21/2021 2/9/21   Kylah Pope MD   ergocalciferol (VITAMIN D2) 50,000 unit capsule Take 50,000 Units by mouth. Provider, Historical   naproxen (NAPROSYN) 250 mg tablet Take  by mouth two (2) times daily (with meals). Patient not taking: Reported on 12/21/2021    Provider, Historical   multivitamin (ONE A DAY) tablet Take 1 Tab by mouth daily. For women over 48    Provider, Historical     No Known Allergies   Family History   Problem Relation Age of Onset    Hypertension Mother     Elevated Lipids Mother     Diabetes Father     Heart Attack Maternal Grandmother     Diabetes Paternal Grandmother     Diabetes Paternal Grandfather       Family history was discussed with the patient, all pertinent and relevant details are mentioned as above, no other pertinent and relevant family history was noted on my discussion with the patient.   Patient specifically denies any history of Gaucher disease in the family  SOCIAL HISTORY:  Patient resides:  Independently x Assisted Living    SNF    With family care       Smoking history:   None    Former    Chronic x     Alcohol history:   None    Social x   Chronic      Ambulates:   Independently    w/cane    w/walker    w/wc    CODE STATUS:  DNR    Full    Other      Objective:     Physical Exam:     Visit Vitals  BP (!) 148/83   Pulse 62   Temp 98.5 °F (36.9 °C)   Resp 18   Ht 5' 6\" (1.676 m)   Wt 49.1 kg (108 lb 4.8 oz)   SpO2 94%   BMI 17.48 kg/m²      O2 Device: None    General : alert x 3, awake, moderately distressed, pleasant female  HEENT: PEERL, EOMI, moist mucus membrane, TM clear  Neck: supple,   Chest: Decreased basal breath sounds  CVS: S1 S2 heard, Capillary refill less than 2 seconds  Abd: soft/ Non tender, non distended, BS physiological,   Ext: no clubbing, no cyanosis, tenderness at the hip joint, leg slightly externally rotated  Neuro/Psych: pleasant mood and affect, no focal neurological deficit  Skin: warm     EKG:  Normal sinus rhythm with nonspecific ST and T wave changes      Data Review:     Recent Days:  Recent Labs     12/21/21  0858   WBC 10.1   HGB 11.7   HCT 34.7*        Recent Labs     12/21/21  0858   *   K 3.5      CO2 24   GLU 89   BUN 10   CREA 0.46*   CA 8.5   ALB 3.8   ALT 23     No results for input(s): PH, PCO2, PO2, HCO3, FIO2 in the last 72 hours.     24 Hour Results:  Recent Results (from the past 24 hour(s))   EKG, 12 LEAD, INITIAL    Collection Time: 12/21/21  8:55 AM   Result Value Ref Range    Ventricular Rate 83 BPM    Atrial Rate 83 BPM    P-R Interval 140 ms    QRS Duration 84 ms    Q-T Interval 440 ms    QTC Calculation (Bezet) 517 ms    Calculated P Axis 77 degrees    Calculated R Axis 69 degrees    Calculated T Axis 75 degrees    Diagnosis       Normal sinus rhythm  Minimal voltage criteria for LVH, may be normal variant ( Delgado product )  Prolonged QT  Abnormal ECG  When compared with ECG of 15-JUL-2018 15:03,  premature ventricular complexes are no longer present     SAMPLES BEING HELD    Collection Time: 12/21/21  8:58 AM   Result Value Ref Range    SAMPLES BEING HELD 1RED     COMMENT        Add-on orders for these samples will be processed based on acceptable specimen integrity and analyte stability, which may vary by analyte. CBC WITH AUTOMATED DIFF    Collection Time: 12/21/21  8:58 AM   Result Value Ref Range    WBC 10.1 3.6 - 11.0 K/uL    RBC 3.53 (L) 3.80 - 5.20 M/uL    HGB 11.7 11.5 - 16.0 g/dL    HCT 34.7 (L) 35.0 - 47.0 %    MCV 98.3 80.0 - 99.0 FL    MCH 33.1 26.0 - 34.0 PG    MCHC 33.7 30.0 - 36.5 g/dL    RDW 11.8 11.5 - 14.5 %    PLATELET 361 082 - 835 K/uL    MPV 8.6 (L) 8.9 - 12.9 FL    NRBC 0.0 0  WBC    ABSOLUTE NRBC 0.00 0.00 - 0.01 K/uL    NEUTROPHILS 83 (H) 32 - 75 %    LYMPHOCYTES 9 (L) 12 - 49 %    MONOCYTES 7 5 - 13 %    EOSINOPHILS 0 0 - 7 %    BASOPHILS 0 0 - 1 %    IMMATURE GRANULOCYTES 1 (H) 0.0 - 0.5 %    ABS. NEUTROPHILS 8.4 (H) 1.8 - 8.0 K/UL    ABS. LYMPHOCYTES 0.9 0.8 - 3.5 K/UL    ABS. MONOCYTES 0.7 0.0 - 1.0 K/UL    ABS. EOSINOPHILS 0.0 0.0 - 0.4 K/UL    ABS. BASOPHILS 0.0 0.0 - 0.1 K/UL    ABS. IMM. GRANS. 0.1 (H) 0.00 - 0.04 K/UL    DF AUTOMATED     METABOLIC PANEL, COMPREHENSIVE    Collection Time: 12/21/21  8:58 AM   Result Value Ref Range    Sodium 132 (L) 136 - 145 mmol/L    Potassium 3.5 3.5 - 5.1 mmol/L    Chloride 101 97 - 108 mmol/L    CO2 24 21 - 32 mmol/L    Anion gap 7 5 - 15 mmol/L    Glucose 89 65 - 100 mg/dL    BUN 10 6 - 20 MG/DL    Creatinine 0.46 (L) 0.55 - 1.02 MG/DL    BUN/Creatinine ratio 22 (H) 12 - 20      GFR est AA >60 >60 ml/min/1.73m2    GFR est non-AA >60 >60 ml/min/1.73m2    Calcium 8.5 8.5 - 10.1 MG/DL    Bilirubin, total 0.5 0.2 - 1.0 MG/DL    ALT (SGPT) 23 12 - 78 U/L    AST (SGOT) 17 15 - 37 U/L    Alk.  phosphatase 52 45 - 117 U/L    Protein, total 6.6 6.4 - 8.2 g/dL    Albumin 3.8 3.5 - 5.0 g/dL    Globulin 2.8 2.0 - 4.0 g/dL    A-G Ratio 1.4 1.1 - 2.2     COVID-19 RAPID TEST    Collection Time: 12/21/21 11:08 AM   Result Value Ref Range    Specimen source Nasopharyngeal      COVID-19 rapid test Not detected NOTD           Imaging:       XR CHEST SNGL V    Result Date: 12/21/2021  No acute process. XR HIP LT W OR WO PELV 2-3 VWS    Result Date: 12/21/2021  Nondisplaced left intertrochanteric hip fracture. .    XR KNEE LT MAX 2 VWS    Result Date: 12/21/2021  No acute abnormality. Assessment/Plan     Left intertrochanteric hip fracture  Hypertension  Tobacco abuse    Patient will be admitted on telemetry bed, n.p.o., appreciate Ortho input, surgical intervention per Ortho, IV hydration, pain control, patient is intermediate risk for surgery based on risk stratification criteria, bedrest, further intervention per hospital course  Continue antihypertensives and continue to monitor  Patient was counseled    DVT prophylaxis: Patient will be on SCDs           Please note that this dictation was completed with [employer]ideaz[employer], the computer voice recognition software. Quite often unanticipated grammatical, syntax, homophones, and other interpretive errors are inadvertently transcribed by the computer software. Please disregard these errors. Please excuse any errors that have escaped final proofreading.          Signed By: Doreen Mario MD     December 21, 2021

## 2021-12-22 ENCOUNTER — APPOINTMENT (OUTPATIENT)
Dept: GENERAL RADIOLOGY | Age: 66
DRG: 481 | End: 2021-12-22
Attending: ORTHOPAEDIC SURGERY
Payer: MEDICARE

## 2021-12-22 ENCOUNTER — ANESTHESIA EVENT (OUTPATIENT)
Dept: SURGERY | Age: 66
DRG: 481 | End: 2021-12-22
Payer: MEDICARE

## 2021-12-22 ENCOUNTER — ANESTHESIA (OUTPATIENT)
Dept: SURGERY | Age: 66
DRG: 481 | End: 2021-12-22
Payer: MEDICARE

## 2021-12-22 PROCEDURE — 2709999900 HC NON-CHARGEABLE SUPPLY: Performed by: ORTHOPAEDIC SURGERY

## 2021-12-22 PROCEDURE — 77030008684 HC TU ET CUF COVD -B: Performed by: STUDENT IN AN ORGANIZED HEALTH CARE EDUCATION/TRAINING PROGRAM

## 2021-12-22 PROCEDURE — C1713 ANCHOR/SCREW BN/BN,TIS/BN: HCPCS | Performed by: ORTHOPAEDIC SURGERY

## 2021-12-22 PROCEDURE — 27245 TREAT THIGH FRACTURE: CPT | Performed by: ORTHOPAEDIC SURGERY

## 2021-12-22 PROCEDURE — 77030020788: Performed by: ORTHOPAEDIC SURGERY

## 2021-12-22 PROCEDURE — 77030041075 HC DRSG AG OPTIFRM MDII -B: Performed by: ORTHOPAEDIC SURGERY

## 2021-12-22 PROCEDURE — 74011250637 HC RX REV CODE- 250/637: Performed by: FAMILY MEDICINE

## 2021-12-22 PROCEDURE — 0QH706Z INSERTION OF INTRAMEDULLARY INTERNAL FIXATION DEVICE INTO LEFT UPPER FEMUR, OPEN APPROACH: ICD-10-PCS | Performed by: ORTHOPAEDIC SURGERY

## 2021-12-22 PROCEDURE — 74011250637 HC RX REV CODE- 250/637: Performed by: ORTHOPAEDIC SURGERY

## 2021-12-22 PROCEDURE — 77030008462 HC STPLR SKN PROX J&J -A: Performed by: ORTHOPAEDIC SURGERY

## 2021-12-22 PROCEDURE — 74011000250 HC RX REV CODE- 250: Performed by: ORTHOPAEDIC SURGERY

## 2021-12-22 PROCEDURE — 74011250636 HC RX REV CODE- 250/636: Performed by: NURSE ANESTHETIST, CERTIFIED REGISTERED

## 2021-12-22 PROCEDURE — 74011000250 HC RX REV CODE- 250: Performed by: NURSE ANESTHETIST, CERTIFIED REGISTERED

## 2021-12-22 PROCEDURE — 73501 X-RAY EXAM HIP UNI 1 VIEW: CPT

## 2021-12-22 PROCEDURE — 74011250636 HC RX REV CODE- 250/636: Performed by: STUDENT IN AN ORGANIZED HEALTH CARE EDUCATION/TRAINING PROGRAM

## 2021-12-22 PROCEDURE — 77030016474 HC BIT DRL QC3 SYNT -C: Performed by: ORTHOPAEDIC SURGERY

## 2021-12-22 PROCEDURE — 74011250636 HC RX REV CODE- 250/636: Performed by: ORTHOPAEDIC SURGERY

## 2021-12-22 PROCEDURE — 74011250636 HC RX REV CODE- 250/636: Performed by: FAMILY MEDICINE

## 2021-12-22 PROCEDURE — 99221 1ST HOSP IP/OBS SF/LOW 40: CPT | Performed by: ORTHOPAEDIC SURGERY

## 2021-12-22 PROCEDURE — 76060000033 HC ANESTHESIA 1 TO 1.5 HR: Performed by: ORTHOPAEDIC SURGERY

## 2021-12-22 PROCEDURE — C1769 GUIDE WIRE: HCPCS | Performed by: ORTHOPAEDIC SURGERY

## 2021-12-22 PROCEDURE — 65270000029 HC RM PRIVATE

## 2021-12-22 PROCEDURE — 74011000250 HC RX REV CODE- 250: Performed by: ANESTHESIOLOGY

## 2021-12-22 PROCEDURE — 77030013079 HC BLNKT BAIR HGGR 3M -A: Performed by: STUDENT IN AN ORGANIZED HEALTH CARE EDUCATION/TRAINING PROGRAM

## 2021-12-22 PROCEDURE — 76010000149 HC OR TIME 1 TO 1.5 HR: Performed by: ORTHOPAEDIC SURGERY

## 2021-12-22 PROCEDURE — 77030020274 HC MISC IMPL ORTHOPEDIC: Performed by: ORTHOPAEDIC SURGERY

## 2021-12-22 PROCEDURE — 76210000016 HC OR PH I REC 1 TO 1.5 HR: Performed by: ORTHOPAEDIC SURGERY

## 2021-12-22 PROCEDURE — 77030026438 HC STYL ET INTUB CARD -A: Performed by: STUDENT IN AN ORGANIZED HEALTH CARE EDUCATION/TRAINING PROGRAM

## 2021-12-22 DEVICE — IMPLANTABLE DEVICE: Type: IMPLANTABLE DEVICE | Site: HIP | Status: FUNCTIONAL

## 2021-12-22 DEVICE — SCREW BNE L34MM DIA5MM NONSTERILE TIB LT GRN TI ST CANN LOK: Type: IMPLANTABLE DEVICE | Site: HIP | Status: FUNCTIONAL

## 2021-12-22 DEVICE — BLADE IM HELCL 85 MM FEN TI TFNA: Type: IMPLANTABLE DEVICE | Site: HIP | Status: FUNCTIONAL

## 2021-12-22 RX ORDER — SODIUM CHLORIDE, SODIUM LACTATE, POTASSIUM CHLORIDE, CALCIUM CHLORIDE 600; 310; 30; 20 MG/100ML; MG/100ML; MG/100ML; MG/100ML
125 INJECTION, SOLUTION INTRAVENOUS CONTINUOUS
Status: DISCONTINUED | OUTPATIENT
Start: 2021-12-22 | End: 2021-12-22 | Stop reason: HOSPADM

## 2021-12-22 RX ORDER — PROPOFOL 10 MG/ML
INJECTION, EMULSION INTRAVENOUS AS NEEDED
Status: DISCONTINUED | OUTPATIENT
Start: 2021-12-22 | End: 2021-12-22 | Stop reason: HOSPADM

## 2021-12-22 RX ORDER — FACIAL-BODY WIPES
10 EACH TOPICAL DAILY PRN
Status: DISCONTINUED | OUTPATIENT
Start: 2021-12-24 | End: 2021-12-27 | Stop reason: HOSPADM

## 2021-12-22 RX ORDER — SODIUM CHLORIDE 0.9 % (FLUSH) 0.9 %
5-40 SYRINGE (ML) INJECTION AS NEEDED
Status: DISCONTINUED | OUTPATIENT
Start: 2021-12-22 | End: 2021-12-27 | Stop reason: HOSPADM

## 2021-12-22 RX ORDER — SUCCINYLCHOLINE CHLORIDE 20 MG/ML
INJECTION INTRAMUSCULAR; INTRAVENOUS AS NEEDED
Status: DISCONTINUED | OUTPATIENT
Start: 2021-12-22 | End: 2021-12-22 | Stop reason: HOSPADM

## 2021-12-22 RX ORDER — FENTANYL CITRATE 50 UG/ML
25 INJECTION, SOLUTION INTRAMUSCULAR; INTRAVENOUS
Status: DISCONTINUED | OUTPATIENT
Start: 2021-12-22 | End: 2021-12-22 | Stop reason: HOSPADM

## 2021-12-22 RX ORDER — DEXAMETHASONE SODIUM PHOSPHATE 4 MG/ML
INJECTION, SOLUTION INTRA-ARTICULAR; INTRALESIONAL; INTRAMUSCULAR; INTRAVENOUS; SOFT TISSUE AS NEEDED
Status: DISCONTINUED | OUTPATIENT
Start: 2021-12-22 | End: 2021-12-22 | Stop reason: HOSPADM

## 2021-12-22 RX ORDER — OXYCODONE HYDROCHLORIDE 5 MG/1
5 TABLET ORAL
Status: DISCONTINUED | OUTPATIENT
Start: 2021-12-22 | End: 2021-12-22 | Stop reason: SDUPTHER

## 2021-12-22 RX ORDER — NALOXONE HYDROCHLORIDE 0.4 MG/ML
0.4 INJECTION, SOLUTION INTRAMUSCULAR; INTRAVENOUS; SUBCUTANEOUS AS NEEDED
Status: DISCONTINUED | OUTPATIENT
Start: 2021-12-22 | End: 2021-12-27 | Stop reason: HOSPADM

## 2021-12-22 RX ORDER — SODIUM CHLORIDE 0.9 % (FLUSH) 0.9 %
5-40 SYRINGE (ML) INJECTION EVERY 8 HOURS
Status: DISCONTINUED | OUTPATIENT
Start: 2021-12-22 | End: 2021-12-22 | Stop reason: HOSPADM

## 2021-12-22 RX ORDER — SODIUM CHLORIDE, SODIUM LACTATE, POTASSIUM CHLORIDE, CALCIUM CHLORIDE 600; 310; 30; 20 MG/100ML; MG/100ML; MG/100ML; MG/100ML
INJECTION, SOLUTION INTRAVENOUS
Status: DISCONTINUED | OUTPATIENT
Start: 2021-12-22 | End: 2021-12-22 | Stop reason: HOSPADM

## 2021-12-22 RX ORDER — MIDAZOLAM HYDROCHLORIDE 1 MG/ML
0.5 INJECTION, SOLUTION INTRAMUSCULAR; INTRAVENOUS
Status: DISCONTINUED | OUTPATIENT
Start: 2021-12-22 | End: 2021-12-22 | Stop reason: HOSPADM

## 2021-12-22 RX ORDER — FERROUS SULFATE, DRIED 160(50) MG
1 TABLET, EXTENDED RELEASE ORAL
Status: DISCONTINUED | OUTPATIENT
Start: 2021-12-23 | End: 2021-12-27 | Stop reason: HOSPADM

## 2021-12-22 RX ORDER — MORPHINE SULFATE 2 MG/ML
2 INJECTION, SOLUTION INTRAMUSCULAR; INTRAVENOUS
Status: DISCONTINUED | OUTPATIENT
Start: 2021-12-22 | End: 2021-12-22 | Stop reason: HOSPADM

## 2021-12-22 RX ORDER — SODIUM CHLORIDE 0.9 % (FLUSH) 0.9 %
5-40 SYRINGE (ML) INJECTION EVERY 8 HOURS
Status: DISCONTINUED | OUTPATIENT
Start: 2021-12-22 | End: 2021-12-27 | Stop reason: HOSPADM

## 2021-12-22 RX ORDER — MORPHINE SULFATE 2 MG/ML
1 INJECTION, SOLUTION INTRAMUSCULAR; INTRAVENOUS
Status: DISCONTINUED | OUTPATIENT
Start: 2021-12-22 | End: 2021-12-22 | Stop reason: SDUPTHER

## 2021-12-22 RX ORDER — HYDROMORPHONE HYDROCHLORIDE 1 MG/ML
0.2 INJECTION, SOLUTION INTRAMUSCULAR; INTRAVENOUS; SUBCUTANEOUS
Status: DISCONTINUED | OUTPATIENT
Start: 2021-12-22 | End: 2021-12-22 | Stop reason: HOSPADM

## 2021-12-22 RX ORDER — ASPIRIN 325 MG
325 TABLET, DELAYED RELEASE (ENTERIC COATED) ORAL 2 TIMES DAILY
Status: DISCONTINUED | OUTPATIENT
Start: 2021-12-22 | End: 2021-12-27 | Stop reason: HOSPADM

## 2021-12-22 RX ORDER — LABETALOL HYDROCHLORIDE 5 MG/ML
10 INJECTION, SOLUTION INTRAVENOUS ONCE
Status: COMPLETED | OUTPATIENT
Start: 2021-12-22 | End: 2021-12-22

## 2021-12-22 RX ORDER — HYDROMORPHONE HYDROCHLORIDE 2 MG/ML
INJECTION, SOLUTION INTRAMUSCULAR; INTRAVENOUS; SUBCUTANEOUS AS NEEDED
Status: DISCONTINUED | OUTPATIENT
Start: 2021-12-22 | End: 2021-12-22 | Stop reason: HOSPADM

## 2021-12-22 RX ORDER — AMOXICILLIN 250 MG
1 CAPSULE ORAL 2 TIMES DAILY
Status: DISCONTINUED | OUTPATIENT
Start: 2021-12-22 | End: 2021-12-27 | Stop reason: HOSPADM

## 2021-12-22 RX ORDER — DIPHENHYDRAMINE HYDROCHLORIDE 50 MG/ML
12.5 INJECTION, SOLUTION INTRAMUSCULAR; INTRAVENOUS AS NEEDED
Status: DISCONTINUED | OUTPATIENT
Start: 2021-12-22 | End: 2021-12-22 | Stop reason: HOSPADM

## 2021-12-22 RX ORDER — ROCURONIUM BROMIDE 10 MG/ML
INJECTION, SOLUTION INTRAVENOUS AS NEEDED
Status: DISCONTINUED | OUTPATIENT
Start: 2021-12-22 | End: 2021-12-22 | Stop reason: HOSPADM

## 2021-12-22 RX ORDER — SODIUM CHLORIDE, SODIUM LACTATE, POTASSIUM CHLORIDE, CALCIUM CHLORIDE 600; 310; 30; 20 MG/100ML; MG/100ML; MG/100ML; MG/100ML
1000 INJECTION, SOLUTION INTRAVENOUS CONTINUOUS
Status: DISCONTINUED | OUTPATIENT
Start: 2021-12-22 | End: 2021-12-22 | Stop reason: HOSPADM

## 2021-12-22 RX ORDER — CEFAZOLIN SODIUM 1 G/3ML
INJECTION, POWDER, FOR SOLUTION INTRAMUSCULAR; INTRAVENOUS AS NEEDED
Status: DISCONTINUED | OUTPATIENT
Start: 2021-12-22 | End: 2021-12-22 | Stop reason: HOSPADM

## 2021-12-22 RX ORDER — PHENYLEPHRINE HCL IN 0.9% NACL 0.4MG/10ML
SYRINGE (ML) INTRAVENOUS AS NEEDED
Status: DISCONTINUED | OUTPATIENT
Start: 2021-12-22 | End: 2021-12-22 | Stop reason: HOSPADM

## 2021-12-22 RX ORDER — EPHEDRINE SULFATE/0.9% NACL/PF 50 MG/5 ML
5 SYRINGE (ML) INTRAVENOUS AS NEEDED
Status: DISCONTINUED | OUTPATIENT
Start: 2021-12-22 | End: 2021-12-22 | Stop reason: HOSPADM

## 2021-12-22 RX ORDER — ONDANSETRON 2 MG/ML
4 INJECTION INTRAMUSCULAR; INTRAVENOUS AS NEEDED
Status: DISCONTINUED | OUTPATIENT
Start: 2021-12-22 | End: 2021-12-22 | Stop reason: HOSPADM

## 2021-12-22 RX ORDER — FENTANYL CITRATE 50 UG/ML
50 INJECTION, SOLUTION INTRAMUSCULAR; INTRAVENOUS AS NEEDED
Status: DISCONTINUED | OUTPATIENT
Start: 2021-12-22 | End: 2021-12-22 | Stop reason: HOSPADM

## 2021-12-22 RX ORDER — OXYCODONE HYDROCHLORIDE 5 MG/1
2.5 TABLET ORAL
Status: DISCONTINUED | OUTPATIENT
Start: 2021-12-22 | End: 2021-12-27 | Stop reason: HOSPADM

## 2021-12-22 RX ORDER — ONDANSETRON 2 MG/ML
INJECTION INTRAMUSCULAR; INTRAVENOUS AS NEEDED
Status: DISCONTINUED | OUTPATIENT
Start: 2021-12-22 | End: 2021-12-22 | Stop reason: HOSPADM

## 2021-12-22 RX ORDER — ONDANSETRON 2 MG/ML
4 INJECTION INTRAMUSCULAR; INTRAVENOUS
Status: ACTIVE | OUTPATIENT
Start: 2021-12-22 | End: 2021-12-23

## 2021-12-22 RX ORDER — SODIUM CHLORIDE 9 MG/ML
125 INJECTION, SOLUTION INTRAVENOUS CONTINUOUS
Status: DISCONTINUED | OUTPATIENT
Start: 2021-12-22 | End: 2021-12-22 | Stop reason: HOSPADM

## 2021-12-22 RX ORDER — MIDAZOLAM HYDROCHLORIDE 1 MG/ML
INJECTION, SOLUTION INTRAMUSCULAR; INTRAVENOUS AS NEEDED
Status: DISCONTINUED | OUTPATIENT
Start: 2021-12-22 | End: 2021-12-22 | Stop reason: HOSPADM

## 2021-12-22 RX ORDER — ACETAMINOPHEN 325 MG/1
650 TABLET ORAL EVERY 6 HOURS
Status: DISCONTINUED | OUTPATIENT
Start: 2021-12-22 | End: 2021-12-27 | Stop reason: HOSPADM

## 2021-12-22 RX ORDER — LIDOCAINE HYDROCHLORIDE 20 MG/ML
INJECTION, SOLUTION EPIDURAL; INFILTRATION; INTRACAUDAL; PERINEURAL AS NEEDED
Status: DISCONTINUED | OUTPATIENT
Start: 2021-12-22 | End: 2021-12-22 | Stop reason: HOSPADM

## 2021-12-22 RX ORDER — MIDAZOLAM HYDROCHLORIDE 1 MG/ML
1 INJECTION, SOLUTION INTRAMUSCULAR; INTRAVENOUS AS NEEDED
Status: DISCONTINUED | OUTPATIENT
Start: 2021-12-22 | End: 2021-12-22 | Stop reason: HOSPADM

## 2021-12-22 RX ORDER — LIDOCAINE HYDROCHLORIDE 10 MG/ML
0.1 INJECTION, SOLUTION EPIDURAL; INFILTRATION; INTRACAUDAL; PERINEURAL AS NEEDED
Status: DISCONTINUED | OUTPATIENT
Start: 2021-12-22 | End: 2021-12-22 | Stop reason: HOSPADM

## 2021-12-22 RX ORDER — ONDANSETRON 4 MG/1
4 TABLET, ORALLY DISINTEGRATING ORAL
Status: DISCONTINUED | OUTPATIENT
Start: 2021-12-22 | End: 2021-12-27 | Stop reason: HOSPADM

## 2021-12-22 RX ORDER — SODIUM CHLORIDE 9 MG/ML
1000 INJECTION, SOLUTION INTRAVENOUS CONTINUOUS
Status: DISCONTINUED | OUTPATIENT
Start: 2021-12-22 | End: 2021-12-22 | Stop reason: HOSPADM

## 2021-12-22 RX ORDER — SODIUM CHLORIDE 9 MG/ML
125 INJECTION, SOLUTION INTRAVENOUS CONTINUOUS
Status: DISPENSED | OUTPATIENT
Start: 2021-12-22 | End: 2021-12-23

## 2021-12-22 RX ORDER — OXYCODONE AND ACETAMINOPHEN 5; 325 MG/1; MG/1
1 TABLET ORAL AS NEEDED
Status: DISCONTINUED | OUTPATIENT
Start: 2021-12-22 | End: 2021-12-22 | Stop reason: HOSPADM

## 2021-12-22 RX ORDER — SODIUM CHLORIDE 0.9 % (FLUSH) 0.9 %
5-40 SYRINGE (ML) INJECTION AS NEEDED
Status: DISCONTINUED | OUTPATIENT
Start: 2021-12-22 | End: 2021-12-22 | Stop reason: HOSPADM

## 2021-12-22 RX ORDER — HYDROXYZINE HYDROCHLORIDE 10 MG/1
10 TABLET, FILM COATED ORAL
Status: COMPLETED | OUTPATIENT
Start: 2021-12-22 | End: 2021-12-23

## 2021-12-22 RX ORDER — ACETAMINOPHEN 500 MG
1000 TABLET ORAL ONCE
Status: DISCONTINUED | OUTPATIENT
Start: 2021-12-22 | End: 2021-12-22 | Stop reason: HOSPADM

## 2021-12-22 RX ORDER — POLYETHYLENE GLYCOL 3350 17 G/17G
17 POWDER, FOR SOLUTION ORAL DAILY
Status: DISCONTINUED | OUTPATIENT
Start: 2021-12-22 | End: 2021-12-27 | Stop reason: HOSPADM

## 2021-12-22 RX ADMIN — MORPHINE SULFATE 1 MG: 2 INJECTION, SOLUTION INTRAMUSCULAR; INTRAVENOUS at 03:56

## 2021-12-22 RX ADMIN — SUCCINYLCHOLINE CHLORIDE 100 MG: 20 INJECTION, SOLUTION INTRAMUSCULAR; INTRAVENOUS at 06:05

## 2021-12-22 RX ADMIN — Medication 10 ML: at 22:00

## 2021-12-22 RX ADMIN — CEFAZOLIN SODIUM 2 G: 1 INJECTION, POWDER, FOR SOLUTION INTRAMUSCULAR; INTRAVENOUS at 13:59

## 2021-12-22 RX ADMIN — Medication 40 MCG: at 06:12

## 2021-12-22 RX ADMIN — OXYCODONE HYDROCHLORIDE 5 MG: 5 TABLET ORAL at 02:49

## 2021-12-22 RX ADMIN — ROCURONIUM BROMIDE 15 MG: 10 SOLUTION INTRAVENOUS at 06:14

## 2021-12-22 RX ADMIN — POLYETHYLENE GLYCOL 3350 17 G: 17 POWDER, FOR SOLUTION ORAL at 10:00

## 2021-12-22 RX ADMIN — Medication 10 ML: at 10:01

## 2021-12-22 RX ADMIN — FENTANYL CITRATE 25 MCG: 0.05 INJECTION, SOLUTION INTRAMUSCULAR; INTRAVENOUS at 07:55

## 2021-12-22 RX ADMIN — DOCUSATE SODIUM 50 MG AND SENNOSIDES 8.6 MG 1 TABLET: 8.6; 5 TABLET, FILM COATED ORAL at 17:55

## 2021-12-22 RX ADMIN — OXYCODONE HYDROCHLORIDE 5 MG: 5 TABLET ORAL at 14:04

## 2021-12-22 RX ADMIN — SODIUM CHLORIDE 125 ML/HR: 9 INJECTION, SOLUTION INTRAVENOUS at 08:12

## 2021-12-22 RX ADMIN — FENTANYL CITRATE 25 MCG: 0.05 INJECTION, SOLUTION INTRAMUSCULAR; INTRAVENOUS at 08:05

## 2021-12-22 RX ADMIN — LISINOPRIL 10 MG: 10 TABLET ORAL at 10:01

## 2021-12-22 RX ADMIN — ONDANSETRON HYDROCHLORIDE 4 MG: 2 INJECTION, SOLUTION INTRAMUSCULAR; INTRAVENOUS at 06:20

## 2021-12-22 RX ADMIN — SODIUM CHLORIDE, POTASSIUM CHLORIDE, SODIUM LACTATE AND CALCIUM CHLORIDE: 600; 310; 30; 20 INJECTION, SOLUTION INTRAVENOUS at 05:58

## 2021-12-22 RX ADMIN — SODIUM CHLORIDE 125 ML/HR: 9 INJECTION, SOLUTION INTRAVENOUS at 16:50

## 2021-12-22 RX ADMIN — ACETAMINOPHEN 650 MG: 325 TABLET ORAL at 17:55

## 2021-12-22 RX ADMIN — ASPIRIN 325 MG: 325 TABLET, COATED ORAL at 10:01

## 2021-12-22 RX ADMIN — DOCUSATE SODIUM 50 MG AND SENNOSIDES 8.6 MG 1 TABLET: 8.6; 5 TABLET, FILM COATED ORAL at 10:00

## 2021-12-22 RX ADMIN — ACETAMINOPHEN 650 MG: 325 TABLET ORAL at 11:59

## 2021-12-22 RX ADMIN — PROPOFOL 100 MG: 10 INJECTION, EMULSION INTRAVENOUS at 06:05

## 2021-12-22 RX ADMIN — CEFAZOLIN 2 G: 330 INJECTION, POWDER, FOR SOLUTION INTRAMUSCULAR; INTRAVENOUS at 06:20

## 2021-12-22 RX ADMIN — DEXAMETHASONE SODIUM PHOSPHATE 4 MG: 4 INJECTION, SOLUTION INTRAMUSCULAR; INTRAVENOUS at 06:20

## 2021-12-22 RX ADMIN — ASPIRIN 325 MG: 325 TABLET, COATED ORAL at 17:55

## 2021-12-22 RX ADMIN — MIDAZOLAM 2 MG: 1 INJECTION INTRAMUSCULAR; INTRAVENOUS at 05:58

## 2021-12-22 RX ADMIN — CEFAZOLIN SODIUM 2 G: 1 INJECTION, POWDER, FOR SOLUTION INTRAMUSCULAR; INTRAVENOUS at 22:24

## 2021-12-22 RX ADMIN — HYDROXYZINE HYDROCHLORIDE 10 MG: 10 TABLET ORAL at 11:59

## 2021-12-22 RX ADMIN — HYDROMORPHONE HYDROCHLORIDE 0.5 MG: 2 INJECTION, SOLUTION INTRAMUSCULAR; INTRAVENOUS; SUBCUTANEOUS at 05:58

## 2021-12-22 RX ADMIN — FENTANYL CITRATE 25 MCG: 0.05 INJECTION, SOLUTION INTRAMUSCULAR; INTRAVENOUS at 07:50

## 2021-12-22 RX ADMIN — Medication 40 MCG: at 06:10

## 2021-12-22 RX ADMIN — Medication 25 MCG/MIN: at 06:19

## 2021-12-22 RX ADMIN — LABETALOL HYDROCHLORIDE 10 MG: 5 INJECTION INTRAVENOUS at 07:58

## 2021-12-22 RX ADMIN — MORPHINE SULFATE 1 MG: 2 INJECTION, SOLUTION INTRAMUSCULAR; INTRAVENOUS at 23:38

## 2021-12-22 RX ADMIN — SODIUM CHLORIDE 125 ML/HR: 9 INJECTION, SOLUTION INTRAVENOUS at 23:38

## 2021-12-22 RX ADMIN — OXYCODONE HYDROCHLORIDE 5 MG: 5 TABLET ORAL at 17:55

## 2021-12-22 RX ADMIN — OXYCODONE HYDROCHLORIDE 5 MG: 5 TABLET ORAL at 10:01

## 2021-12-22 RX ADMIN — Medication 10 ML: at 22:25

## 2021-12-22 RX ADMIN — HYDROMORPHONE HYDROCHLORIDE 1 MG: 2 INJECTION, SOLUTION INTRAMUSCULAR; INTRAVENOUS; SUBCUTANEOUS at 07:03

## 2021-12-22 RX ADMIN — ROCURONIUM BROMIDE 5 MG: 10 SOLUTION INTRAVENOUS at 06:05

## 2021-12-22 RX ADMIN — OXYCODONE HYDROCHLORIDE 5 MG: 5 TABLET ORAL at 22:25

## 2021-12-22 RX ADMIN — SERTRALINE 50 MG: 50 TABLET, FILM COATED ORAL at 10:00

## 2021-12-22 RX ADMIN — LIDOCAINE HYDROCHLORIDE 80 MG: 20 INJECTION, SOLUTION EPIDURAL; INFILTRATION; INTRACAUDAL; PERINEURAL at 06:05

## 2021-12-22 NOTE — ANESTHESIA POSTPROCEDURE EVALUATION
Post-Anesthesia Evaluation and Assessment    Patient: Jens Aggarwal MRN: [id number]  SSN: [id number]    YOB: 1955  Age: 61 y.o. Sex: female      I have evaluated the patient and they are stable and ready for discharge from the PACU. Cardiovascular Function/Vital Signs  Visit Vitals  BP (!) 146/84   Pulse 78   Temp 36.9 °C (98.5 °F)   Resp 13   Ht 5' 6\" (1.676 m)   Wt 49.1 kg (108 lb 4.8 oz)   SpO2 99%   BMI 17.48 kg/m²       Patient is status post General anesthesia for Procedure(s):  LEFT HIP TFNA NAIL INSERTION  SYNTHES. Nausea/Vomiting: None    Postoperative hydration reviewed and adequate. Pain:  Pain Scale 1: Numeric (0 - 10) (12/22/21 0843)  Pain Intensity 1: 2 (12/22/21 0843)   Managed    Neurological Status:   Neuro (WDL): Exceptions to WDL (12/22/21 0711)  Neuro  Neurologic State: Drowsy (12/22/21 0815)  Orientation Level: Oriented to person (12/22/21 0815)  Cognition: Decreased attention/concentration;Decreased command following (12/22/21 0711)  Speech: Delayed responses;Clear (12/22/21 0711)  LUE Motor Response: Purposeful (12/22/21 0815)  LLE Motor Response: Purposeful (12/22/21 0815)  RUE Motor Response: Purposeful (12/22/21 0815)  RLE Motor Response: Purposeful (12/22/21 0815)   At baseline    Mental Status, Level of Consciousness: Alert and  oriented to person, place, and time    Pulmonary Status:   O2 Device: Nasal cannula (12/22/21 0815)   Adequate oxygenation and airway patent    Complications related to anesthesia: None    Post-anesthesia assessment completed. No concerns    Signed By: Annabel Bah MD     December 22, 2021              Procedure(s):  LEFT HIP TFNA NAIL INSERTION  SYNTHES.    general    <BSHSIANPOST>    INITIAL Post-op Vital signs:   Vitals Value Taken Time   /82 12/22/21 0815   Temp 36.9 °C (98.5 °F) 12/22/21 0815   Pulse 81 12/22/21 0827   Resp 15 12/22/21 0827   SpO2 98 % 12/22/21 0827   Vitals shown include unvalidated device data.

## 2021-12-22 NOTE — PROGRESS NOTES
Physical Therapy  12/22/2021    Occupational Therapy   Orders received, chart review completed. Note patient POD #0 LEFT HIP TFNA NAIL INSERTION and not a fast track at this time. PT will follow up tomorrow for evaluation. Thank you.      Chelsie Hulda Gitelman, PT, DPT

## 2021-12-22 NOTE — PROGRESS NOTES
Occupational Therapy   Orders received, chart review completed. Note patient POD #0 LEFT HIP TFNA NAIL INSERTION and not a fast track at this time. OT will follow up tomorrow for evaluation. Recommend OOB to chair three times a day for meals, self-completion of ADLs as able and medically stable. Thank you.

## 2021-12-22 NOTE — OP NOTES
[address]  OPERATIVE REPORT    Name:  Schuyler Cheung  MR#:  [id number]  :  1955  ACCOUNT #:  [de-identified]  DATE OF SERVICE:  2021      PREOPERATIVE DIAGNOSIS:  Left intertrochanteric hip fracture. POSTOPERATIVE DIAGNOSIS:  Left intertrochanteric hip fracture. PROCEDURE PERFORMED:  Left hip intramedullary nail. SURGEON:  Khushbu Mejias DO    ASSISTANT:  None. ANESTHESIA:  General.    COMPLICATIONS:  None. SPECIMENS REMOVED:  None. IMPLANTS:  Synthes size 11 short TFNA device. ESTIMATED BLOOD LOSS:  50 mL. DISPOSITION:  Stable to PACU. INDICATIONS FOR THE PROCEDURE:  The patient is a 80-year-old female who presented to the hospital after a ground level fall. She was found to have evidence of a displaced intertrochanteric left hip fracture and Orthopedics was consulted for management of this fracture. She was admitted by the medical team.  We discussed treatment options. Risks and benefits of left hip intramedullary nailing were explained. We discussed risks of infection, blood loss, neurovascular injury, anesthesia risks, and risks secondary to the patient's comorbidities. Once she was medically optimized and ready for surgical treatment, she was seen in the preoperative holding area. History and physical forms and consent forms were confirmed in her chart and her operative extremity was marked by Orthopedics. OPERATION:  The patient was taken to the OR and transferred to the operating room Elmo table after she was given sedation and intubated by Anesthesia. Sterile prepping and draping of the left hip was performed after we confirmed appropriate alignment of her hip fracture on x-ray. We then called timeout. The patient was identified by name, date of birth, operative site, and operative procedure.   After all were in agreement that the left hip was the operative extremity, an incision was made for an approach to the tip of the greater trochanter. Careful dissection down to avoid neurovascular structures was performed and we gained access to the tip of the greater trochanter with a guide pen. After confirming on AP and lateral x-rays appropriate positioning of this guide pen, we used an opening reamer and then based on preoperative templating, placed a short Synthes TFNA device down. We used x-ray to help place our helical blade through the cortex of the bone from the lateral incision. We then predrilled and placed our helical blade and locked this in to place after gaining compression of the fracture site. We then placed our distal locking screw under x-ray guidance through a small incision. We closed with combination of 0 Vicryl, 2-0 Vicryl, and staples after thoroughly irrigating. The patient was transferred to PACU after sterile dressings were applied.   She will be monitored closely in the hospital.      Elgin Hope DO [id number]/[email or web address]  D:  12/22/2021 7:45  T:  12/22/2021 12:13  JOB #:  [id number]

## 2021-12-22 NOTE — CONSULTS
ORTHO CONSULT    Subjective:     Date of Consultation:  December 22, 2021    Referring Physician:  ZEB    Diana Youngblood is a 61 y.o.  female who is being seen for left hip pain. Workup has revealed IT L hip fx. 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 03/31/2015    ADHD (attention deficit hyperactivity disorder) 03/31/2015    Caffeine dependence ([facility] [address] 03/31/2015    Depressive disorder, not elsewhere classified 08/27/2014    Generalized anxiety disorder 08/27/2014    Memory disturbance 08/07/2014    Inflammatory arthritis 02/27/2014    AVM (arteriovenous malformation) brain 11/14/2011    Benign essential hypertension      Family History   Problem Relation Age of Onset    Hypertension Mother     Elevated Lipids Mother     Diabetes Father     Heart Attack Maternal Grandmother     Diabetes Paternal Grandmother     Diabetes Paternal Grandfather       Social History     Tobacco Use    Smoking status: Current Every Day Smoker     Packs/day: 0.50     Years: 16.00     Pack years: 8.00    Smokeless tobacco: Never Used   Substance Use Topics    Alcohol use:  Yes     Alcohol/week: 2.0 standard drinks     Types: 1 Glasses of wine, 1 Cans of beer per week     Past Medical History:   Diagnosis Date    ADD (attention deficit disorder)     Arthritis     AVM (arteriovenous malformation) brain 2009    CVA (cerebral infarction) 2009    Hypertension     Inattention       Past Surgical History: Procedure Laterality Date    HX HEENT      HX HYSTERECTOMY      HX ORTHOPAEDIC      NEUROLOGICAL PROCEDURE UNLISTED      anurism surgery, AVM malformatiom      Prior to Admission medications    Medication Sig Start Date End Date Taking? Authorizing Provider   atorvastatin (Lipitor) 20 mg tablet Take 20 mg by mouth daily. Yes Other, MD Teodoro   aspirin delayed-release 81 mg tablet Take  by mouth daily. Yes Provider, Historical   sertraline (ZOLOFT) 50 mg tablet Take 1 Tab by mouth daily. Refills must come from psych 1/9/20  Yes Jeffry Colin MD   lisinopril (PRINIVIL, ZESTRIL) 10 mg tablet TAKE 1 TABLET BY MOUTH ONCE DAILY 12/26/19  Yes Kimberley Mccray NP   triamterene-hydroCHLOROthiazide (DYAZIDE) 37.5-25 mg per capsule TAKE ONE CAPSULE BY MOUTH DAILY FOR HYPERTENSION  Indications: high blood pressure 6/28/19  Yes Farida Camp NP   alendronate (FOSAMAX) 70 mg tablet TAKE ONE TABLET BY MOUTH ONCE WEEKLY  Patient not taking: Reported on 12/21/2021 4/8/21   Boubacar Armijo MD   hydrOXYchloroQUINE (PLAQUENIL) 200 mg tablet TAKE 1 AND 1/2 TABLET BY MOUTH DAILY  Patient not taking: Reported on 12/21/2021 4/8/21   Boubacar Armijo MD   predniSONE (DELTASONE) 5 mg tablet 20mg x 3 days, 15mg x 3 days, 10mg x 3 days, 5mg x 3 days  Patient not taking: Reported on 12/21/2021 2/9/21   Boubacar Armijo MD   ergocalciferol (VITAMIN D2) 50,000 unit capsule Take 50,000 Units by mouth. Provider, Historical   naproxen (NAPROSYN) 250 mg tablet Take  by mouth two (2) times daily (with meals). Patient not taking: Reported on 12/21/2021    Provider, Historical   multivitamin (ONE A DAY) tablet Take 1 Tab by mouth daily.  For women over 50    Provider, Historical     Current Facility-Administered Medications   Medication Dose Route Frequency    lactated Ringers infusion  125 mL/hr IntraVENous CONTINUOUS    0.9% sodium chloride infusion  125 mL/hr IntraVENous CONTINUOUS    sodium chloride (NS) flush 5-40 mL  5-40 mL IntraVENous Q8H  sodium chloride (NS) flush 5-40 mL  5-40 mL IntraVENous PRN    lidocaine (PF) (XYLOCAINE) 10 mg/mL (1 %) injection 0.1 mL  0.1 mL SubCUTAneous PRN    fentaNYL citrate (PF) injection 50 mcg  50 mcg IntraVENous PRN    midazolam (VERSED) injection 1 mg  1 mg IntraVENous PRN    midazolam (VERSED) injection 1 mg  1 mg IntraVENous PRN    acetaminophen (TYLENOL) tablet 1,000 mg  1,000 mg Oral ONCE    lisinopriL (PRINIVIL, ZESTRIL) tablet 10 mg  10 mg Oral DAILY    sertraline (ZOLOFT) tablet 50 mg  50 mg Oral DAILY    0.9% sodium chloride infusion  75 mL/hr IntraVENous CONTINUOUS    sodium chloride (NS) flush 5-40 mL  5-40 mL IntraVENous Q8H    sodium chloride (NS) flush 5-40 mL  5-40 mL IntraVENous PRN    naloxone (NARCAN) injection 0.4 mg  0.4 mg IntraVENous PRN    oxyCODONE IR (ROXICODONE) tablet 5 mg  5 mg Oral Q4H PRN    morphine injection 1 mg  1 mg IntraVENous Q4H PRN     No Known Allergies     Review of Systems:  Pertinent items are noted in HPI. Objective:     Patient Vitals for the past 8 hrs:   BP Temp Pulse Resp SpO2   21 0537 (!) 150/84 98.5 °F (36.9 °C) 87 20 95 %   21 0428 123/79 99.8 °F (37.7 °C) 91 18 95 %     Temp (24hrs), Av.6 °F (37 °C), Min:98 °F (36.7 °C), Max:99.8 °F (37.7 °C)        Physical Exam:  General:     Alert and oriented. No acute distress. Chest:     Respirations unlabored. Abdomen:     Extremities:   Soft, non-tender. No evidence of cyanosis. Pulses palpable in both upper and lower extremities. Lakshmi's sign negative in bilateral lower extremities. Moving all extremities. Pain with ROM L hip.  +logroll       Neurologic:  No motor deficits. Sensation stable. Neurovascular exam within normal limits.                  Data Review   Recent Results (from the past 24 hour(s))   EKG, 12 LEAD, INITIAL    Collection Time: 21  8:55 AM   Result Value Ref Range    Ventricular Rate 83 BPM    Atrial Rate 83 BPM    P-R Interval 140 ms    QRS Duration 84 ms    Q-T Interval 440 ms    QTC Calculation (Bezet) 517 ms    Calculated P Axis 77 degrees    Calculated R Axis 69 degrees    Calculated T Axis 75 degrees    Diagnosis       Normal sinus rhythm  Minimal voltage criteria for LVH, may be normal variant ( Delgado product )  Prolonged QT  Abnormal ECG  When compared with ECG of 15-JUL-2018 15:03,  premature ventricular complexes are no longer present     SAMPLES BEING HELD    Collection Time: 12/21/21  8:58 AM   Result Value Ref Range    SAMPLES BEING HELD 1RED     COMMENT        Add-on orders for these samples will be processed based on acceptable specimen integrity and analyte stability, which may vary by analyte. CBC WITH AUTOMATED DIFF    Collection Time: 12/21/21  8:58 AM   Result Value Ref Range    WBC 10.1 3.6 - 11.0 K/uL    RBC 3.53 (L) 3.80 - 5.20 M/uL    HGB 11.7 11.5 - 16.0 g/dL    HCT 34.7 (L) 35.0 - 47.0 %    MCV 98.3 80.0 - 99.0 FL    MCH 33.1 26.0 - 34.0 PG    MCHC 33.7 30.0 - 36.5 g/dL    RDW 11.8 11.5 - 14.5 %    PLATELET 909 483 - 070 K/uL    MPV 8.6 (L) 8.9 - 12.9 FL    NRBC 0.0 0  WBC    ABSOLUTE NRBC 0.00 0.00 - 0.01 K/uL    NEUTROPHILS 83 (H) 32 - 75 %    LYMPHOCYTES 9 (L) 12 - 49 %    MONOCYTES 7 5 - 13 %    EOSINOPHILS 0 0 - 7 %    BASOPHILS 0 0 - 1 %    IMMATURE GRANULOCYTES 1 (H) 0.0 - 0.5 %    ABS. NEUTROPHILS 8.4 (H) 1.8 - 8.0 K/UL    ABS. LYMPHOCYTES 0.9 0.8 - 3.5 K/UL    ABS. MONOCYTES 0.7 0.0 - 1.0 K/UL    ABS. EOSINOPHILS 0.0 0.0 - 0.4 K/UL    ABS. BASOPHILS 0.0 0.0 - 0.1 K/UL    ABS. IMM.  GRANS. 0.1 (H) 0.00 - 0.04 K/UL    DF AUTOMATED     METABOLIC PANEL, COMPREHENSIVE    Collection Time: 12/21/21  8:58 AM   Result Value Ref Range    Sodium 132 (L) 136 - 145 mmol/L    Potassium 3.5 3.5 - 5.1 mmol/L    Chloride 101 97 - 108 mmol/L    CO2 24 21 - 32 mmol/L    Anion gap 7 5 - 15 mmol/L    Glucose 89 65 - 100 mg/dL    BUN 10 6 - 20 MG/DL    Creatinine 0.46 (L) 0.55 - 1.02 MG/DL    BUN/Creatinine ratio 22 (H) 12 - 20      GFR est AA >60 >60 ml/min/1.73m2    GFR est non-AA >60 >60 ml/min/1.73m2    Calcium 8.5 8.5 - 10.1 MG/DL    Bilirubin, total 0.5 0.2 - 1.0 MG/DL    ALT (SGPT) 23 12 - 78 U/L    AST (SGOT) 17 15 - 37 U/L    Alk. phosphatase 52 45 - 117 U/L    Protein, total 6.6 6.4 - 8.2 g/dL    Albumin 3.8 3.5 - 5.0 g/dL    Globulin 2.8 2.0 - 4.0 g/dL    A-G Ratio 1.4 1.1 - 2.2     TYPE & SCREEN    Collection Time: 12/21/21  8:58 AM   Result Value Ref Range    Crossmatch Expiration 12/24/2021,2359     ABO/Rh(D) A POSITIVE     Antibody screen NEG    COVID-19 RAPID TEST    Collection Time: 12/21/21 11:08 AM   Result Value Ref Range    Specimen source Nasopharyngeal      COVID-19 rapid test Not detected NOTD     PROTHROMBIN TIME + INR    Collection Time: 12/21/21  5:03 PM   Result Value Ref Range    INR 1.0 0.9 - 1.1      Prothrombin time 10.5 9.0 - 11.1 sec   URINALYSIS W/ REFLEX CULTURE    Collection Time: 12/21/21  7:05 PM    Specimen: Urine   Result Value Ref Range    Color YELLOW/STRAW      Appearance CLEAR CLEAR      Specific gravity 1.011 1.003 - 1.030      pH (UA) 6.0 5.0 - 8.0      Protein Negative NEG mg/dL    Glucose Negative NEG mg/dL    Ketone 15 (A) NEG mg/dL    Bilirubin Negative NEG      Blood Negative NEG      Urobilinogen 0.2 0.2 - 1.0 EU/dL    Nitrites Negative NEG      Leukocyte Esterase Negative NEG      UA:UC IF INDICATED CULTURE NOT INDICATED BY UA RESULT CNI      WBC 0-4 0 - 4 /hpf    RBC 0-5 0 - 5 /hpf    Epithelial cells FEW FEW /lpf    Bacteria Negative NEG /hpf    Hyaline cast 0-2 0 - 5 /lpf   GLUCOSE, POC    Collection Time: 12/21/21  9:29 PM   Result Value Ref Range    Glucose (POC) 126 (H) 65 - 117 mg/dL    [place] [facility]          Assessment/Plan:     L IT hip fx    Pain control  Cleared and npo since yesterday at 1pm awaiting surgery  Discussed L hip IMN. The risks of surgery were explained.   Risks of infection, blood loss, neurovascular injury, anesthesia risks, and risks secondary to patient comorbidities were explained.     Can wbat after surgery  Ice to hip      Tinnie Massing, DO

## 2021-12-22 NOTE — PROGRESS NOTES
[id number] [facility] Adult  Hospitalist Group                                                                                          Hospitalist Progress Note  Merlene Rojas MD  Answering service: [phone] from in house phone        Date of Service:  2021  NAME:  Timothy Barrett  :  1955  MRN:  [id number]      Admission Summary:   Timothy Barrett is a 61 y.o. female who presents with hip pain    Interval history / Subjective:   S/o IMN     Assessment & Plan:     Left intertrochanteric hip fracture  -- MGMT per ortho  -- Pain control  -- lovenox whan cleared by surgery    Hypertension  -- cont lisinopril  -- PRN hydralazine     Depression anxiety  -- on Zoloft    Tobacco abuse  -- counseled cesasion    Code status: Full  DVT prophylaxis: Per surgery on asa now     Care Plan discussed with: Patient/Family and Nurse  Anticipated Disposition: Home w/Family  Anticipated Discharge: 24 hours to 48 hours     Hospital Problems  Date Reviewed: 2020          Codes Class Noted POA    Hip fracture [facility]) ICD-10-CM: X62.111C  ICD-9-CM: 820.8  2021 Unknown                Review of Systems:   A comprehensive review of systems was negative. Vital Signs:    Last 24hrs VS reviewed since prior progress note. Most recent are:  Visit Vitals  BP (!) 146/84   Pulse 78   Temp 98.5 °F (36.9 °C)   Resp 13   Ht 5' 6\" (1.676 m)   Wt 49.1 kg (108 lb 4.8 oz)   SpO2 99%   BMI 17.48 kg/m²         Intake/Output Summary (Last 24 hours) at 2021 0901  Last data filed at 2021 2776  Gross per 24 hour   Intake 250 ml   Output 73 ml   Net 177 ml        Physical Examination:     I had a face to face encounter with this patient and independently examined them on 2021 as outlined below:          Constitutional:  No acute distress, cooperative, pleasant    ENT:  Oral mucosa moist, oropharynx benign. Resp:  CTA bilaterally. No wheezing/rhonchi/rales.  No accessory muscle use   CV:  Regular rhythm, normal rate, no murmurs, gallops, rubs    GI:  Soft, non distended, non tender. normoactive bowel sounds, no hepatosplenomegaly     Musculoskeletal:  No edema, warm, 2+ pulses throughout    Neurologic:  Moves all extremities. AAOx3, CN II-XII reviewed            Data Review:    I personally reviewed  Image and labs      Labs:     Recent Labs     12/21/21  0858   WBC 10.1   HGB 11.7   HCT 34.7*        Recent Labs     12/21/21  0858   *   K 3.5      CO2 24   BUN 10   CREA 0.46*   GLU 89   CA 8.5     Recent Labs     12/21/21  0858   ALT 23   AP 52   TBILI 0.5   TP 6.6   ALB 3.8   GLOB 2.8     Recent Labs     12/21/21  1703   INR 1.0   PTP 10.5      No results for input(s): FE, TIBC, PSAT, FERR in the last 72 hours. No results found for: FOL, RBCF   No results for input(s): PH, PCO2, PO2 in the last 72 hours. No results for input(s): CPK, CKNDX, TROIQ in the last 72 hours.     No lab exists for component: CPKMB  Lab Results   Component Value Date/Time    Cholesterol, total 251 (H) 07/16/2018 11:04 AM    HDL Cholesterol 81 07/16/2018 11:04 AM    LDL, calculated 150 (H) 07/16/2018 11:04 AM    Triglyceride 100 07/16/2018 11:04 AM     Lab Results   Component Value Date/Time    Glucose (POC) 126 (H) 12/21/2021 09:29 PM    Glucose (POC) 98 10/02/2011 01:42 PM     Lab Results   Component Value Date/Time    Color YELLOW/STRAW 12/21/2021 07:05 PM    Appearance CLEAR 12/21/2021 07:05 PM    Specific gravity 1.011 12/21/2021 07:05 PM    pH (UA) 6.0 12/21/2021 07:05 PM    Protein Negative 12/21/2021 07:05 PM    Glucose Negative 12/21/2021 07:05 PM    Ketone 15 (A) 12/21/2021 07:05 PM    Bilirubin Negative 12/21/2021 07:05 PM    Urobilinogen 0.2 12/21/2021 07:05 PM    Nitrites Negative 12/21/2021 07:05 PM    Leukocyte Esterase Negative 12/21/2021 07:05 PM    Epithelial cells FEW 12/21/2021 07:05 PM    Bacteria Negative 12/21/2021 07:05 PM    WBC 0-4 12/21/2021 07:05 PM    RBC 0-5 12/21/2021 07:05 PM Medications Reviewed:     Current Facility-Administered Medications   Medication Dose Route Frequency    sodium chloride (NS) flush 5-40 mL  5-40 mL IntraVENous PRN    0.9% sodium chloride infusion  125 mL/hr IntraVENous CONTINUOUS    sodium chloride (NS) flush 5-40 mL  5-40 mL IntraVENous Q8H    sodium chloride (NS) flush 5-40 mL  5-40 mL IntraVENous PRN    acetaminophen (TYLENOL) tablet 650 mg  650 mg Oral Q6H    oxyCODONE IR (ROXICODONE) tablet 2.5 mg  2.5 mg Oral Q4H PRN    oxyCODONE IR (ROXICODONE) tablet 5 mg  5 mg Oral Q4H PRN    naloxone (NARCAN) injection 0.4 mg  0.4 mg IntraVENous PRN    ondansetron (ZOFRAN ODT) tablet 4 mg  4 mg Oral Q4H PRN    ondansetron (ZOFRAN) injection 4 mg  4 mg IntraVENous Q4H PRN    [START ON 12/23/2021] calcium-vitamin D (OS-DELFINO +D3) 500 mg-200 unit per tablet 1 Tablet  1 Tablet Oral TID WITH MEALS    senna-docusate (PERICOLACE) 8.6-50 mg per tablet 1 Tablet  1 Tablet Oral BID    polyethylene glycol (MIRALAX) packet 17 g  17 g Oral DAILY    [START ON 12/24/2021] bisacodyL (DULCOLAX) suppository 10 mg  10 mg Rectal DAILY PRN    morphine injection 1 mg  1 mg IntraVENous Q4H PRN    hydrOXYzine HCL (ATARAX) tablet 10 mg  10 mg Oral Q8H PRN    ceFAZolin (ANCEF) 2 g in sterile water (preservative free) 20 mL IV syringe  2 g IntraVENous Q8H    aspirin delayed-release tablet 325 mg  325 mg Oral BID    lisinopriL (PRINIVIL, ZESTRIL) tablet 10 mg  10 mg Oral DAILY    sertraline (ZOLOFT) tablet 50 mg  50 mg Oral DAILY    0.9% sodium chloride infusion  75 mL/hr IntraVENous CONTINUOUS    sodium chloride (NS) flush 5-40 mL  5-40 mL IntraVENous Q8H    sodium chloride (NS) flush 5-40 mL  5-40 mL IntraVENous PRN    naloxone (NARCAN) injection 0.4 mg  0.4 mg IntraVENous PRN    oxyCODONE IR (ROXICODONE) tablet 5 mg  5 mg Oral Q4H PRN    morphine injection 1 mg  1 mg IntraVENous Q4H PRN ______________________________________________________________________  EXPECTED LENGTH OF STAY: - - -  ACTUAL LENGTH OF STAY:          1                 Alberto Higgins MD

## 2021-12-22 NOTE — ANESTHESIA PREPROCEDURE EVALUATION
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   (+) Recurrent depression (HCC)      CARDIOVASCULAR   (+) Benign essential hypertension      ENDOCRINE   (+) Inflammatory arthritis       Anesthetic History   No history of anesthetic complications            Review of Systems / Medical History  Patient summary reviewed, nursing notes reviewed and pertinent labs reviewed    Pulmonary          Smoker         Neuro/Psych         Psychiatric history     Cardiovascular    Hypertension                Comments: Normal echo stress 2018  EKG 12/2021, NSR, suspected LVH   GI/Hepatic/Renal                Endo/Other        Arthritis     Other Findings              Physical Exam    Airway  Mallampati: II  TM Distance: 4 - 6 cm  Neck ROM: normal range of motion   Mouth opening: Normal     Cardiovascular    Rhythm: regular  Rate: normal         Dental    Dentition: Full upper dentures     Pulmonary  Breath sounds clear to auscultation               Abdominal  GI exam deferred       Other Findings            Anesthetic Plan    ASA: 2, emergent  Anesthesia type: general          Induction: Intravenous  Anesthetic plan and risks discussed with: Patient

## 2021-12-22 NOTE — PERIOP NOTES
TRANSFER - OUT REPORT:    Verbal report given to Tea Garvey (name) on Melvin Cutting  being transferred to [phone] (unit) for routine post - op       Report consisted of patients Situation, Background, Assessment and   Recommendations(SBAR). Time Pre op antibiotic given:0620  Anesthesia Stop time: 3946  Webber Present on Transfer to floor:y  Order for Webber on Chart:y  Discharge Prescriptions with Chart:n    Information from the following report(s) SBAR, OR Summary, Intake/Output, MAR and Accordion was reviewed with the receiving nurse. Opportunity for questions and clarification was provided. Is the patient on 02? YES       L/Min 2       Other     Is the patient on a monitor? NO    Is the nurse transporting with the patient? NO    Surgical Waiting Area notified of patient's transfer from PACU? NO      The following personal items collected during your admission accompanied patient upon transfer:   Dental Appliance: Dental Appliances:  At bedside  Vision: Visual Aid: Glasses  Hearing Aid: Hearing Aid: None  Jewelry:    Clothing:    Other Valuables:    Valuables sent to safe:      Glasses to floor with pt

## 2021-12-22 NOTE — PERIOP NOTES
TRANSFER - IN REPORT:    Verbal report received from [facility] [place]) on Lachelle Correia  being received from 561(unit) for ordered procedure      Report consisted of patients Situation, Background, Assessment and   Recommendations(SBAR). Information from the following report(s) SBAR, ED Summary, Intake/Output and Cardiac Rhythm NSR was reviewed with the receiving nurse. Opportunity for questions and clarification was provided. Assessment completed upon patients arrival to unit and care assumed.

## 2021-12-22 NOTE — BRIEF OP NOTE
Brief Postoperative Note    Patient: Maxim Coyne  YOB: 1955  MRN: [id number]    Date of Procedure: 12/22/2021     Pre-Op Diagnosis: LEFT IT HIP FRACTURE    Post-Op Diagnosis: Same as preoperative diagnosis.       Procedure(s):  LEFT HIP IMN    Surgeon(s):  Serafin Gibbs DO    Surgical Assistant: None    Anesthesia: General     Estimated Blood Loss (mL): less than 50     Complications: None    Specimens: * No specimens in log *     Implants: * No implants in log *    Drains: * No LDAs found *    Findings: IT hip fx    Electronically Signed by Handy Delgadillo DO on 12/22/2021 at 6:54 AM

## 2021-12-23 LAB
ANION GAP SERPL CALC-SCNC: 4 MMOL/L (ref 5–15)
ATRIAL RATE: 83 BPM
BUN SERPL-MCNC: 10 MG/DL (ref 6–20)
BUN/CREAT SERPL: 16 (ref 12–20)
CALCIUM SERPL-MCNC: 8 MG/DL (ref 8.5–10.1)
CALCULATED P AXIS, ECG09: 77 DEGREES
CALCULATED R AXIS, ECG10: 69 DEGREES
CALCULATED T AXIS, ECG11: 75 DEGREES
CHLORIDE SERPL-SCNC: 110 MMOL/L (ref 97–108)
CO2 SERPL-SCNC: 26 MMOL/L (ref 21–32)
CREAT SERPL-MCNC: 0.61 MG/DL (ref 0.55–1.02)
DIAGNOSIS, 93000: NORMAL
GLUCOSE SERPL-MCNC: 119 MG/DL (ref 65–100)
HGB BLD-MCNC: 9.7 G/DL (ref 11.5–16)
P-R INTERVAL, ECG05: 140 MS
POTASSIUM SERPL-SCNC: 3.4 MMOL/L (ref 3.5–5.1)
Q-T INTERVAL, ECG07: 440 MS
QRS DURATION, ECG06: 84 MS
QTC CALCULATION (BEZET), ECG08: 517 MS
SODIUM SERPL-SCNC: 140 MMOL/L (ref 136–145)
VENTRICULAR RATE, ECG03: 83 BPM

## 2021-12-23 PROCEDURE — 77030038269 HC DRN EXT URIN PURWCK BARD -A

## 2021-12-23 PROCEDURE — 80048 BASIC METABOLIC PNL TOTAL CA: CPT

## 2021-12-23 PROCEDURE — 97161 PT EVAL LOW COMPLEX 20 MIN: CPT

## 2021-12-23 PROCEDURE — 97535 SELF CARE MNGMENT TRAINING: CPT

## 2021-12-23 PROCEDURE — 36415 COLL VENOUS BLD VENIPUNCTURE: CPT

## 2021-12-23 PROCEDURE — 97165 OT EVAL LOW COMPLEX 30 MIN: CPT

## 2021-12-23 PROCEDURE — 97116 GAIT TRAINING THERAPY: CPT

## 2021-12-23 PROCEDURE — 85018 HEMOGLOBIN: CPT

## 2021-12-23 PROCEDURE — 74011250637 HC RX REV CODE- 250/637: Performed by: ORTHOPAEDIC SURGERY

## 2021-12-23 PROCEDURE — 65270000029 HC RM PRIVATE

## 2021-12-23 PROCEDURE — 97530 THERAPEUTIC ACTIVITIES: CPT

## 2021-12-23 PROCEDURE — 74011250636 HC RX REV CODE- 250/636: Performed by: ORTHOPAEDIC SURGERY

## 2021-12-23 PROCEDURE — 74011250637 HC RX REV CODE- 250/637: Performed by: NURSE PRACTITIONER

## 2021-12-23 PROCEDURE — 94760 N-INVAS EAR/PLS OXIMETRY 1: CPT

## 2021-12-23 RX ORDER — POTASSIUM CHLORIDE 750 MG/1
40 TABLET, FILM COATED, EXTENDED RELEASE ORAL
Status: COMPLETED | OUTPATIENT
Start: 2021-12-23 | End: 2021-12-23

## 2021-12-23 RX ADMIN — MORPHINE SULFATE 1 MG: 2 INJECTION, SOLUTION INTRAMUSCULAR; INTRAVENOUS at 09:18

## 2021-12-23 RX ADMIN — ACETAMINOPHEN 650 MG: 325 TABLET ORAL at 12:37

## 2021-12-23 RX ADMIN — ASPIRIN 325 MG: 325 TABLET, COATED ORAL at 18:26

## 2021-12-23 RX ADMIN — DOCUSATE SODIUM 50 MG AND SENNOSIDES 8.6 MG 1 TABLET: 8.6; 5 TABLET, FILM COATED ORAL at 18:26

## 2021-12-23 RX ADMIN — MORPHINE SULFATE 1 MG: 2 INJECTION, SOLUTION INTRAMUSCULAR; INTRAVENOUS at 14:07

## 2021-12-23 RX ADMIN — POLYETHYLENE GLYCOL 3350 17 G: 17 POWDER, FOR SOLUTION ORAL at 08:45

## 2021-12-23 RX ADMIN — OXYCODONE HYDROCHLORIDE 5 MG: 5 TABLET ORAL at 02:17

## 2021-12-23 RX ADMIN — OYSTER SHELL CALCIUM WITH VITAMIN D 1 TABLET: 500; 200 TABLET, FILM COATED ORAL at 08:45

## 2021-12-23 RX ADMIN — HYDROXYZINE HYDROCHLORIDE 10 MG: 10 TABLET ORAL at 18:26

## 2021-12-23 RX ADMIN — DOCUSATE SODIUM 50 MG AND SENNOSIDES 8.6 MG 1 TABLET: 8.6; 5 TABLET, FILM COATED ORAL at 08:45

## 2021-12-23 RX ADMIN — OYSTER SHELL CALCIUM WITH VITAMIN D 1 TABLET: 500; 200 TABLET, FILM COATED ORAL at 12:37

## 2021-12-23 RX ADMIN — ASPIRIN 325 MG: 325 TABLET, COATED ORAL at 08:45

## 2021-12-23 RX ADMIN — SERTRALINE 50 MG: 50 TABLET, FILM COATED ORAL at 08:45

## 2021-12-23 RX ADMIN — Medication 10 ML: at 06:00

## 2021-12-23 RX ADMIN — OXYCODONE HYDROCHLORIDE 5 MG: 5 TABLET ORAL at 16:37

## 2021-12-23 RX ADMIN — Medication 10 ML: at 06:30

## 2021-12-23 RX ADMIN — LISINOPRIL 10 MG: 10 TABLET ORAL at 08:45

## 2021-12-23 RX ADMIN — Medication 10 ML: at 14:09

## 2021-12-23 RX ADMIN — Medication 10 ML: at 14:08

## 2021-12-23 RX ADMIN — Medication 10 ML: at 21:01

## 2021-12-23 RX ADMIN — OXYCODONE HYDROCHLORIDE 5 MG: 5 TABLET ORAL at 21:00

## 2021-12-23 RX ADMIN — POTASSIUM CHLORIDE 40 MEQ: 750 TABLET, FILM COATED, EXTENDED RELEASE ORAL at 06:30

## 2021-12-23 RX ADMIN — OXYCODONE HYDROCHLORIDE 5 MG: 5 TABLET ORAL at 06:30

## 2021-12-23 RX ADMIN — ACETAMINOPHEN 650 MG: 325 TABLET ORAL at 18:26

## 2021-12-23 RX ADMIN — ACETAMINOPHEN 650 MG: 325 TABLET ORAL at 05:38

## 2021-12-23 RX ADMIN — ACETAMINOPHEN 650 MG: 325 TABLET ORAL at 00:00

## 2021-12-23 RX ADMIN — MORPHINE SULFATE 1 MG: 2 INJECTION, SOLUTION INTRAMUSCULAR; INTRAVENOUS at 18:26

## 2021-12-23 RX ADMIN — OYSTER SHELL CALCIUM WITH VITAMIN D 1 TABLET: 500; 200 TABLET, FILM COATED ORAL at 16:37

## 2021-12-23 RX ADMIN — OXYCODONE HYDROCHLORIDE 5 MG: 5 TABLET ORAL at 10:38

## 2021-12-23 NOTE — PROGRESS NOTES
Problem: Mobility Impaired (Adult and Pediatric)  Goal: *Acute Goals and Plan of Care (Insert Text)  Description: FUNCTIONAL STATUS PRIOR TO ADMISSION: Patient was independent and active without use of DME.    HOME SUPPORT PRIOR TO ADMISSION: The patient lived alone with family close by  however pt did not require assist for ADL's. Physical Therapy Goals  Initiated 12/23/2021  1. Patient will move from supine to sit and sit to supine  and scoot up and down in bed with modified independence within 7 day(s). 2.  Patient will transfer from bed to chair and chair to bed with modified independence using the least restrictive device within 7 day(s). 3.  Patient will perform sit to stand with modified independence within 7 day(s). 4.  Patient will ambulate with modified independence for > 150 feet with the least restrictive device within 7 day(s). 5.  Patient will ascend/descend 4 stairs with one handrail(s) with modified independence within 7 day(s). PHYSICAL THERAPY TREATMENT  Patient: Omar Stacy (45 y.o. female)  Date: 12/23/2021  Diagnosis: Hip fracture ([address] Jai Flores <principal problem not specified>  Procedure(s) (LRB):  LEFT HIP TFNA NAIL INSERTION  SYNTHES (Left) 1 Day Post-Op  Precautions: Fall,WBAT  Chart, physical therapy assessment, plan of care and goals were reviewed. ASSESSMENT  Patient continues with skilled PT services and is progressing towards goals. Pt demonstrated improving standing with RW - worked on lateral right left shift, then stepping in place and then small steps turing sideways to bed. Patient able to tolerate increased weight bearing left leg. Current Level of Function Impacting Discharge (mobility/balance):  Mod assist x 2 for sit to stand; min assist x 2 for taking small steps 3 feet to bed; max assist sit to supine    Other factors to consider for discharge: previously Indep - lived alone         PLAN :  Patient continues to benefit from skilled intervention to address the above impairments. Continue treatment per established plan of care. to address goals. Recommendation for discharge: (in order for the patient to meet his/her long term goals)  Therapy 3 hours per day 5-7 days per week    This discharge recommendation:  Has been made in collaboration with the attending provider and/or case management    IF patient discharges home will need the following DME: to be determined (TBD)       SUBJECTIVE:   Patient stated i'm ready to go back to bed.     OBJECTIVE DATA SUMMARY:   Critical Behavior:  Neurologic State: Alert  Orientation Level: Oriented X4  Cognition: Follows commands     Functional Mobility Training:  Bed Mobility:     Supine to Sit: Moderate assistance;Assist x2  Sit to Supine: Maximum assistance;Assist x1  Scooting: Maximum assistance;Assist x2        Transfers:  Sit to Stand: Minimum assistance; Moderate assistance;Assist x2 (from lower chair surface)  Stand to Sit: Minimum assistance;Assist x2                             Balance:  Sitting: Impaired  Sitting - Static: Good (unsupported)  Sitting - Dynamic: Not tested  Standing: Impaired; With support  Standing - Static: Fair;Constant support  Standing - Dynamic : Fair;Constant support  Ambulation/Gait Training:  Distance (ft): 3 Feet (ft)  Assistive Device: Walker, rolling;Gait belt  Ambulation - Level of Assistance: Minimal assistance;Assist x2        Gait Abnormalities: Antalgic;Decreased step clearance        Base of Support: Center of gravity altered;Narrowed; Shift to right  Stance: Left decreased  Speed/Autumn: Slow  Step Length: Right shortened;Left shortened  Swing Pattern: Left asymmetrical;Right asymmetrical     Pain Rating:  Pt states unable to rate --- screams out when in pain    Activity Tolerance:   Fair - limited by pain    After treatment patient left in no apparent distress:   Supine in bed, Call bell within reach, Bed / chair alarm activated, Side rails x 3, and ice applied left hip    COMMUNICATION/COLLABORATION:   The patients plan of care was discussed with: Registered nurse.      Zeinab Harkins, PT   Time Calculation: 25 mins

## 2021-12-23 NOTE — PROGRESS NOTES
RUR 9 %     SREEKANTH- IPR vs SNF- referrals sent to [facility] and [facility] SNF via [address] [facility], [place], operated [facility]. BLS needed at discharge. Medicare pt has received, reviewed, and signed first IM letter informing them of their right to appeal the discharge. Signed copied has been placed on pt bedside chart. Care Management Interventions  PCP Verified by CM: Yes  Last Visit to PCP: 11/17/21  Palliative Care Criteria Met (RRAT>21 & CHF Dx)?: No  Mode of Transport at Discharge: BLS  Transition of Care Consult (CM Consult): Acute Rehab,Other (IPR vs SNF)  MyChart Signup: No  Discharge Durable Medical Equipment: No  Health Maintenance Reviewed: Yes  Physical Therapy Consult: Yes  Occupational Therapy Consult: Yes  Speech Therapy Consult: No  Support Systems: Child(jake)  Confirm Follow Up Transport: Family  The Plan for Transition of Care is Related to the Following Treatment Goals : IPR vs SNF  The Patient and/or Patient Representative was Provided with a Choice of Provider and Agrees with the Discharge Plan?: Yes  Name of the Patient Representative Who was Provided with a Choice of Provider and Agrees with the Discharge Plan: the patient  [address] Provided?: No  Discharge Location  Discharge Placement: Rehab hospital/unit acute    Reason for Admission:  Hip fracture with surgery repair                     RUR Score:     9 %                Plan for utilizing home health:    No, IPR vs SNF    Transition of Care Plan:     The Plan for Transition of Care is related to the following treatment goals: IPR vs SNF    The Patient and/or patient representative was provided with a choice of provider and agrees  with the discharge plan. Yes [x] No []    A Freedom of choice list was provided with basic dialogue that supports the patient's individualized plan of care/goals and shares the quality data associated with the providers.        Yes [x] No []        PCP: First and Last name:  Kati Leung MD     Name of Practice:    Are you a current patient: Yes/No:    Approximate date of last visit:    Can you participate in a virtual visit with your PCP:                     Current Advanced Directive/Advance Care Plan: Full Code      Healthcare Decision Maker:   Click here to complete [address] including selection of the [address] Relationship (ie \"Primary\")             Primary Decision Maker: Tessy Qureshi - [phone]                  Transition of Care Plan:       IPR vs SNF- [facility]. The patient lives alone in a condo and does not use DME. The patient stated that she tripped over her shoe string fell and hit her knee then her hip. The patient has a daughter Brooke Lesch [phone] and a sister that live local for support. The patient is familiar with [facility] for [facility]. The patient is agreeable for SNF or IPR. CM explained the differences of the levels of care. Patient would prefer IPR-    CM confirmed demographics, Insurance, PCP, and Pharmacy- [address] [place] [address]. CM will follow for transitions of care.      SALOME CarlsonW

## 2021-12-23 NOTE — PROGRESS NOTES
[facility] Adult  Hospitalist Group                                                                                          Hospitalist Progress Note  Bennett Keating NP  Answering service: [phone] from in house phone        Date of Service:  2021  NAME:  Nir Nolen  :  1955  MRN:  [id number]      Admission Summary:   Nir Nolen is a 61 y.o. female who presents with hip pain  Patient reports that she was watching Netflix felt warm this morning, got up to go to the bathroom, and stepped on her shoelace, tripped and fell, started having some left knee pain and left hip pain, woke up this morning and had significant left hip pain to the point where she could not move, got concerned came to the ER, patient was found to have a femoral fracture and was requested to be admitted to the hospital service, patient denies any loss of consciousness    Interval history / Subjective:    Seen and examined patient sitting in bedside chair. States that she is feeling ok. Upon entry to the room- she had left hip surgical dressing peeled back and scratching surgical incision site. Discussed risk of infection by doing that. Left lower extremity is positive for circulation, sensation and movement. No new complaints. No overnight events noted. Demonstrated correct usage of IS   Per patient's request called sister Stephy Mercado ([phone]) Updated her on patient's condition and plan of care. Questions answered. Assessment & Plan:     Left intertrochanteric hip fracture   - s/p Left hip intramedullar nail   - Continue with pain management   - PT/OT following   - Ortho following   - Encourage IS usage    Acute blood loss anemia   - Secondary to surgery   - Hgb 9.7  - Monitor labs and transfuse for hgb <7.0     Hypertension   - Stable   - Continue lisinopril   - Hydralazine PRN   - Monitor vital signs per unit routine     Hypokalemia   - Potassium 3.4   - Replaced.    - Monitor labs and replace as needed. Tobacco abuse   - Smoking cessation counseling provided   - Refused nicotine patch       Code status: Full   DVT prophylaxis: Aspirin     Care Plan discussed with: Patient/Family, Nurse and   Anticipated Disposition: [facility]/Rehab  Anticipated Discharge: Less than 24 hours     Hospital Problems  Date Reviewed: 1/29/2020          Codes Class Noted POA    Hip fracture [facility]) ICD-10-CM: L59.220U  ICD-9-CM: 820.8  12/21/2021 Unknown                Review of Systems:   A comprehensive review of systems was negative except for that written in the HPI. Vital Signs:    Last 24hrs VS reviewed since prior progress note. Most recent are:  Visit Vitals  /63   Pulse 84   Temp 98.8 °F (37.1 °C)   Resp 19   Ht 5' 6\" (1.676 m)   Wt 49.1 kg (108 lb 4.8 oz)   SpO2 98%   BMI 17.48 kg/m²       No intake or output data in the 24 hours ending 12/23/21 1323     Physical Examination:             Constitutional:  No acute distress, cooperative, pleasant    ENT:  Oral mucosa moist, oropharynx benign. Resp:  CTA bilaterally. No wheezing/rhonchi/rales. No accessory muscle use   CV:  Regular rhythm, normal rate, no murmurs, gallops, rubs    GI:  Soft, non distended, non tender. normoactive bowel sounds, no hepatosplenomegaly     Musculoskeletal:  No edema, warm, 2+ pulses throughout. LLE is positive for sensation, movement and circulation. Neurologic:  Moves all extremities.   AAOx3, CN II-XII reviewed     Psych:  Not anxious or agitaed       Data Review:    Review and/or order of clinical lab test  Review and/or order of tests in the radiology section of CPT  Review and/or order of tests in the medicine section of CPT      Labs:     Recent Labs     12/23/21 0220 12/21/21  0858   WBC  --  10.1   HGB 9.7* 11.7   HCT  --  34.7*   PLT  --  218     Recent Labs     12/23/21 0220 12/21/21  0858    132*   K 3.4* 3.5   * 101   CO2 26 24   BUN 10 10   CREA 0.61 0.46*   * 89   CA 8.0* 8.5     Recent Labs     12/21/21  0858   ALT 23   AP 52   TBILI 0.5   TP 6.6   ALB 3.8   GLOB 2.8     Recent Labs     12/21/21  1703   INR 1.0   PTP 10.5      No results for input(s): FE, TIBC, PSAT, FERR in the last 72 hours. No results found for: FOL, RBCF   No results for input(s): PH, PCO2, PO2 in the last 72 hours. No results for input(s): CPK, CKNDX, TROIQ in the last 72 hours.     No lab exists for component: CPKMB  Lab Results   Component Value Date/Time    Cholesterol, total 251 (H) 07/16/2018 11:04 AM    HDL Cholesterol 81 07/16/2018 11:04 AM    LDL, calculated 150 (H) 07/16/2018 11:04 AM    Triglyceride 100 07/16/2018 11:04 AM     Lab Results   Component Value Date/Time    Glucose (POC) 126 (H) 12/21/2021 09:29 PM    Glucose (POC) 98 10/02/2011 01:42 PM     Lab Results   Component Value Date/Time    Color YELLOW/STRAW 12/21/2021 07:05 PM    Appearance CLEAR 12/21/2021 07:05 PM    Specific gravity 1.011 12/21/2021 07:05 PM    pH (UA) 6.0 12/21/2021 07:05 PM    Protein Negative 12/21/2021 07:05 PM    Glucose Negative 12/21/2021 07:05 PM    Ketone 15 (A) 12/21/2021 07:05 PM    Bilirubin Negative 12/21/2021 07:05 PM    Urobilinogen 0.2 12/21/2021 07:05 PM    Nitrites Negative 12/21/2021 07:05 PM    Leukocyte Esterase Negative 12/21/2021 07:05 PM    Epithelial cells FEW 12/21/2021 07:05 PM    Bacteria Negative 12/21/2021 07:05 PM    WBC 0-4 12/21/2021 07:05 PM    RBC 0-5 12/21/2021 07:05 PM         Medications Reviewed:     Current Facility-Administered Medications   Medication Dose Route Frequency    sodium chloride (NS) flush 5-40 mL  5-40 mL IntraVENous PRN    sodium chloride (NS) flush 5-40 mL  5-40 mL IntraVENous Q8H    sodium chloride (NS) flush 5-40 mL  5-40 mL IntraVENous PRN    acetaminophen (TYLENOL) tablet 650 mg  650 mg Oral Q6H    oxyCODONE IR (ROXICODONE) tablet 2.5 mg  2.5 mg Oral Q4H PRN    naloxone (NARCAN) injection 0.4 mg  0.4 mg IntraVENous PRN    ondansetron (ZOFRAN ODT) tablet 4 mg  4 mg Oral Q4H PRN    calcium-vitamin D (OS-DELFINO +D3) 500 mg-200 unit per tablet 1 Tablet  1 Tablet Oral TID WITH MEALS    senna-docusate (PERICOLACE) 8.6-50 mg per tablet 1 Tablet  1 Tablet Oral BID    polyethylene glycol (MIRALAX) packet 17 g  17 g Oral DAILY    [START ON 12/24/2021] bisacodyL (DULCOLAX) suppository 10 mg  10 mg Rectal DAILY PRN    hydrOXYzine HCL (ATARAX) tablet 10 mg  10 mg Oral Q8H PRN    aspirin delayed-release tablet 325 mg  325 mg Oral BID    lisinopriL (PRINIVIL, ZESTRIL) tablet 10 mg  10 mg Oral DAILY    sertraline (ZOLOFT) tablet 50 mg  50 mg Oral DAILY    0.9% sodium chloride infusion  75 mL/hr IntraVENous CONTINUOUS    sodium chloride (NS) flush 5-40 mL  5-40 mL IntraVENous Q8H    sodium chloride (NS) flush 5-40 mL  5-40 mL IntraVENous PRN    oxyCODONE IR (ROXICODONE) tablet 5 mg  5 mg Oral Q4H PRN    morphine injection 1 mg  1 mg IntraVENous Q4H PRN     ______________________________________________________________________  EXPECTED LENGTH OF STAY: - - -  ACTUAL LENGTH OF STAY:          2                 Tea Chew, NP

## 2021-12-23 NOTE — PROGRESS NOTES
Problem: Pain  Goal: *Control of Pain  Outcome: Progressing Towards Goal     Problem: Falls - Risk of  Goal: *Absence of Falls  Description: Document Jessica Fall Risk and appropriate interventions in the flowsheet.   Outcome: Progressing Towards Goal  Note: Fall Risk Interventions:            Medication Interventions: Teach patient to arise slowly    Elimination Interventions: Call light in reach    History of Falls Interventions: Door open when patient unattended,Room close to nurse's station         Problem: Patient Education: Go to Patient Education Activity  Goal: Patient/Family Education  Outcome: Progressing Towards Goal

## 2021-12-23 NOTE — PROGRESS NOTES
Problem: Mobility Impaired (Adult and Pediatric)  Goal: *Acute Goals and Plan of Care (Insert Text)  Description: FUNCTIONAL STATUS PRIOR TO ADMISSION: Patient was independent and active without use of DME.    HOME SUPPORT PRIOR TO ADMISSION: The patient lived alone with family close by  however pt did not require assist for ADL's. Physical Therapy Goals  Initiated 12/23/2021  1. Patient will move from supine to sit and sit to supine  and scoot up and down in bed with modified independence within 7 day(s). 2.  Patient will transfer from bed to chair and chair to bed with modified independence using the least restrictive device within 7 day(s). 3.  Patient will perform sit to stand with modified independence within 7 day(s). 4.  Patient will ambulate with modified independence for > 150 feet with the least restrictive device within 7 day(s). 5.  Patient will ascend/descend 4 stairs with one handrail(s) with modified independence within 7 day(s). PHYSICAL THERAPY EVALUATION  Patient: Donna Hathaway (3 y.o. female)  Date: 12/23/2021  Primary Diagnosis: Hip fracture ([address] [S72.009A]  Procedure(s) (LRB):  LEFT HIP TFNA NAIL INSERTION  SYNTHES (Left) 1 Day Post-Op   Precautions:   Fall,WBAT    ASSESSMENT  Based on the objective data described below, the patient presents POD# 1 s/p hip fracture with left hip TFNA nail insertion synthes with severe pain left hip with movement, decreased AROM/strength and function left leg, decline in functional mobility and impaired standing balance/gait with RW. Pt also hx of AVM - surgery, CVA and attention deficit disorder. Patient remains very talkative and difficulty to focus on task. Pt also yells out in pain during transfers especially supine to/from sit and sit to/from stand. Pt states it's just her - that's what she does when I pain. Patient tolerated OOB to chair, able to take a few steps - requiring assist x 2.   Given that pt lived alone and was active - recommend inpatient rehab. Current Level of Function Impacting Discharge (mobility/balance): Min to mod assist x 2 for transfers/attempting amb; mod to max assist x 2 for supine to/from sit    Functional Outcome Measure: The patient scored 45/100 on the Barthel Index outcome measure . Other factors to consider for discharge: PLOF lived alone     Patient will benefit from skilled therapy intervention to address the above noted impairments. PLAN :  Recommendations and Planned Interventions: bed mobility training, transfer training, gait training, therapeutic exercises, patient and family training/education, and therapeutic activities      Frequency/Duration: Patient will be followed by physical therapy:  twice daily x 2 days then daily  to address goals. Recommendation for discharge: (in order for the patient to meet his/her long term goals)  Therapy 3 hours per day 5-7 days per week    This discharge recommendation:  Has been made in collaboration with the attending provider and/or case management    IF patient discharges home will need the following DME: to be determined (TBD)         SUBJECTIVE:   Patient stated that's just me I will scream and cuss if I'm in pain.     OBJECTIVE DATA SUMMARY:   HISTORY:    Past Medical History:   Diagnosis Date    ADD (attention deficit disorder)     Arthritis     AVM (arteriovenous malformation) brain 2009    CVA (cerebral infarction) 2009    Hypertension     Inattention      Past Surgical History:   Procedure Laterality Date    HX HEENT      HX HYSTERECTOMY      HX ORTHOPAEDIC      NEUROLOGICAL PROCEDURE UNLISTED      anurism surgery, AVM malformatiom       Personal factors and/or comorbidities impacting plan of care: as above    Home Situation  Home Environment:  (condo)  # Steps to Enter: 5  Rails to Enter: Yes  One/Two Story Residence: One story  Living Alone: Yes  Support Systems: Child(jake)  Patient Expects to be Discharged to[de-identified] [employer]  Current DME Used/Available at Home: None  Tub or Shower Type: Shower    EXAMINATION/PRESENTATION/DECISION MAKING:   Critical Behavior:  Neurologic State: Alert  Orientation Level: Oriented X4  Cognition: Follows commands     Hearing: Auditory  Auditory Impairment: None  Range Of Motion:  AROM:  (except left leg)                       Strength:    Strength: Generally decreased, functional (except left leg)                    Tone & Sensation:   Tone: Normal              Sensation: Intact               Coordination:  Coordination: Within functional limits  Vision:   Acuity: Within Defined Limits  Corrective Lenses: Glasses  Functional Mobility:  Bed Mobility:     Supine to Sit: Moderate assistance;Assist x2  Sit to Supine: Maximum assistance;Assist x2  Scooting: Maximum assistance;Assist x2  Transfers:  Sit to Stand: Minimum assistance;Assist x2;Adaptive equipment; Additional time (from high bed surface)  Stand to Sit: Minimum assistance;Assist x2                       Balance:   Sitting: Impaired (tends to lean backwards)  Sitting - Static: Good (unsupported)  Sitting - Dynamic: Not tested  Standing: Impaired; With support  Standing - Static: Fair;Constant support  Standing - Dynamic : Poor;Constant support  Ambulation/Gait Training:  Distance (ft): 3 Feet (ft)  Assistive Device: Walker, rolling;Gait belt  Ambulation - Level of Assistance: Minimal assistance; Moderate assistance;Assist x2; Additional time; Adaptive equipment        Gait Abnormalities: Antalgic;Decreased step clearance (unable to adequately weight shift to left)        Base of Support: Center of gravity altered;Narrowed; Shift to right  Stance: Left decreased  Speed/Autumn: Slow;Shuffled  Step Length: Right shortened;Left shortened  Swing Pattern: Left asymmetrical;Right asymmetrical          Functional Measure:  Barthel Index:    Bathin  Bladder: 10  Bowels: 10  Groomin  Dressin  Feedin  Mobility: 0  Stairs: 0  Toilet Use: 5  Transfer (Bed to Chair and Back): 5  Total: 45/100       The Barthel ADL Index: Guidelines  1. The index should be used as a record of what a patient does, not as a record of what a patient could do. 2. The main aim is to establish degree of independence from any help, physical or verbal, however minor and for whatever reason. 3. The need for supervision renders the patient not independent. 4. A patient's performance should be established using the best available evidence. Asking the patient, friends/relatives and nurses are the usual sources, but direct observation and common sense are also important. However direct testing is not needed. 5. Usually the patient's performance over the preceding 24-48 hours is important, but occasionally longer periods will be relevant. 6. Middle categories imply that the patient supplies over 50 per cent of the effort. 7. Use of aids to be independent is allowed. Score Interpretation (from [address] [address])    Independent   60-79 Minimally independent   40-59 Partially dependent   20-39 Very dependent   <20 Totally dependent     -Caleb Mancuso., Barthel, D.W. (1965). Functional evaluation: the Barthel Index. [facility] ([address] (1997). The Barthel activities of daily living index: self-reporting versus actual performance in the old (> or = 75 years). Journal [address] 45(7), [address] [facility], [facility]., [place] [place] [facility]. (1999). Measuring the change in disability after inpatient rehabilitation; comparison of the responsiveness of the Barthel Index and Functional [place] Measure. Journal of Neurology, Neurosurgery, and Psychiatry, 66(4), 929-364. Jose Padilla, N.J.A, AVTAR Triplett, & Kinza Chang MLavonneA. (2004) Assessment of post-stroke quality of life in cost-effectiveness studies: The usefulness of the Barthel Index and the EuroQoL-5D.  Quality of Life Research, 15, 184-90           Physical Therapy Evaluation Charge Determination   History Examination Presentation Decision-Making   LOW Complexity : Zero comorbidities / personal factors that will impact the outcome / POC LOW Complexity : 1-2 Standardized tests and measures addressing body structure, function, activity limitation and / or participation in recreation  LOW Complexity : Stable, uncomplicated  LOW Complexity : FOTO score of       Based on the above components, the patient evaluation is determined to be of the following complexity level: LOW     Pain Rating: At rest left hip 7/10; when moving or attempting to bear weight 100/10    Activity Tolerance:   Fair POD# 1 - limited by pain    After treatment patient left in no apparent distress:   Sitting in chair, Call bell within reach, and Bed / chair alarm activated    COMMUNICATION/EDUCATION:   The patients plan of care was discussed with: Registered nurse and Occupational Therapy. Fall prevention education was provided and the patient/caregiver indicated understanding., Patient/family have participated as able in goal setting and plan of care. , and Patient/family agree to work toward stated goals and plan of care.     Thank you for this referral.  Jai Villegas, PT   Time Calculation: 25 mins

## 2021-12-23 NOTE — PROGRESS NOTES
Spiritual Care Partner Volunteer visited patient in [facility] on 12.23.21.     Documented by Eric Dacosta, Staff , on 12.23.21  Please call SIMONE ([phone]) to page  if needed

## 2021-12-23 NOTE — PROGRESS NOTES
Ortho Daily Progress Note    12/23/2021    POD:  POD1 Days Post-Op  S/P:  Procedure(s):   Left hip intramedullary nail    HPI: 62 yo F that is POD1, s/p  Left hip intramedullary nail with Dr. Lela Palmer on 12/22/21. The patient was finishing up with OT prior to our encounter and is resting well in the reclining chair. She reports that she is not used to the new hip, and that she had pain into the left hip during therapy. She states the pain is different now than prior to surgery. She feels she is doing well and states pain medications are helping to manage the pain. She is very motivated to participate with therapy. She has no other orthopedic complaints. She denies paresthesias, SOB, fever, chills. LABS:  Lab Results   Component Value Date/Time    HGB 9.7 (L) 12/23/2021 02:20 AM    INR 1.0 12/21/2021 05:03 PM     Recent Results (from the past 12 hour(s))   METABOLIC PANEL, BASIC    Collection Time: 12/23/21  2:20 AM   Result Value Ref Range    Sodium 140 136 - 145 mmol/L    Potassium 3.4 (L) 3.5 - 5.1 mmol/L    Chloride 110 (H) 97 - 108 mmol/L    CO2 26 21 - 32 mmol/L    Anion gap 4 (L) 5 - 15 mmol/L    Glucose 119 (H) 65 - 100 mg/dL    BUN 10 6 - 20 MG/DL    Creatinine 0.61 0.55 - 1.02 MG/DL    BUN/Creatinine ratio 16 12 - 20      GFR est AA >60 >60 ml/min/1.73m2    GFR est non-AA >60 >60 ml/min/1.73m2    Calcium 8.0 (L) 8.5 - 10.1 MG/DL   HEMOGLOBIN    Collection Time: 12/23/21  2:20 AM   Result Value Ref Range    HGB 9.7 (L) 11.5 - 16.0 g/dL         ORTHOPEDIC PHYSICAL EXAM:  LLE MSK: Left hip with dressing to the lateral hip. Dressing with small pinpoint area of bloody saturation, otherwise is C/D/I. Compartments of the LLE are soft and compressible, without pain. 2+ DP pulse. Cap refill is brisk. Sensation to light touch intact to the medial/lateral/posterior foot. 5/5 strength with dorsi/plantar flexion. Able to flex and extend toes. No pain on passive flexion of great toe. Negative Hommans sign. ORTHOPEDIC ASSESSMENT:   Left intertrochanteric hip fracture that is now s/p Left hip intramedullary nail    ORTHOPEDIC PLAN:  1. Followed by Dr. Bee Chris  2. Ortho plan: No further ortho surgical plans. 3. DVT ppx:  mg bid; SCDs  4. Pain control: Adequate; per admitting team, ice and elevation. 5. Dressing: May leave dressing and ace wrap in place if remains C/D/I. Change prn for saturation with dry sterile dressing. 6. Activity: WBAT LLE   7. Dispo: Awaiting PT/OT recs. Continue to follow.     [facility], [address]

## 2021-12-23 NOTE — PROGRESS NOTES
Problem: Self Care Deficits Care Plan (Adult)  Goal: *Acute Goals and Plan of Care (Insert Text)  Description: FUNCTIONAL STATUS PRIOR TO ADMISSION: Patient was independent and active without use of DME.    HOME SUPPORT: The patient lived alone with family who lives locally, however she did not require assistance for any ADL/IADL tasks. Occupational Therapy Goals  Initiated 12/23/2021  1. Patient will perform lower body ADLs with AE supervision/set-up within 4 day(s). 2.  Patient will perform upper body ADLs standing 5 mins without fatigue or LOB with supervision/set-up within 4 day(s). 3.  Patient will perform toilet transfer with supervision/set-up within 4 day(s). 4.  Patient will perform all aspects of toileting with supervision/set-up within 4 day(s). 5.  Patient will participate in upper extremity therapeutic exercise/activities with supervision/set-up for 10 minutes within 4 day(s). 6.  Patient will utilize energy conservation techniques during functional activities without cues within 4 day(s). Outcome: Not Met   OCCUPATIONAL THERAPY EVALUATION  Patient: Alphonse Rubinstein (11 y.o. female)  Date: 12/23/2021  Primary Diagnosis: Hip fracture ([facility] [address] [S72.009A]  Procedure(s) (LRB):  LEFT HIP TFNA NAIL INSERTION  SYNTHES (Left) 1 Day Post-Op   Precautions:   Fall,WBAT    ASSESSMENT  Based on the objective data described below, the patient presents with impaired activity tolerance, impaired LB reach, decreased standing and dynamic sitting balance, and significant pain s/p L TFNA nail insertion POD1. Pt received in bedside chair and agreeable to participate in therapy session. Pt very talkative and at times somewhat labile, however progressed to standing with RW, Arcenio and max VC/encourgament. Pt required up to max A for LB dressing and was educated on the use of dressing AE. Recommend d/c to SNF vs HHOT and assist with OOB mobility/ADL/IADL tasks pending pt progress.      Current Level of Function Impacting Discharge (ADLs/self-care): up to max A LB ADLs    Functional Outcome Measure: The patient scored 45/100 on the Barthel Index outcome measure which is indicative of partial dependence in basic self-care. Other factors to consider for discharge: PLOF     Patient will benefit from skilled therapy intervention to address the above noted impairments. PLAN :  Recommendations and Planned Interventions: self care training, functional mobility training, therapeutic exercise, balance training, therapeutic activities, endurance activities, patient education and home safety training    Frequency/Duration: Patient will be followed by occupational therapy 5 times a week to address goals. Recommendation for discharge: (in order for the patient to meet his/her long term goals)  To be determined: SNF vs HHOT and assist with OOB mobility/ADL/IADL tasks pending pt progress    This discharge recommendation:  Has been made in collaboration with the attending provider and/or case management    IF patient discharges home will need the following DME: TBD       SUBJECTIVE:   Patient stated Just put that thing around my neck.  (referring to gait belt when experiencing significant pain)    OBJECTIVE DATA SUMMARY:   HISTORY:   Past Medical History:   Diagnosis Date    ADD (attention deficit disorder)     Arthritis     AVM (arteriovenous malformation) brain 2009    CVA (cerebral infarction) 2009    Hypertension     Inattention      Past Surgical History:   Procedure Laterality Date    HX HEENT      HX HYSTERECTOMY      HX ORTHOPAEDIC      NEUROLOGICAL PROCEDURE UNLISTED      anurism surgery, AVM malformatiom       Expanded or extensive additional review of patient history:     Home Situation  Home Environment:  (condo)  # Steps to Enter: 5  Rails to Enter: Yes  One/Two Story Residence: One story  Living Alone: Yes  Support Systems: Child(jake),Other Family Member(s)  Patient Expects to be Discharged to[de-identified] House  Current DME Used/Available at Home: None  Tub or Shower Type: Shower    EXAMINATION OF PERFORMANCE DEFICITS:  Cognitive/Behavioral Status:  Neurologic State: Alert  Orientation Level: Oriented X4                Skin: visually intact    Edema: none noted    Hearing: Auditory  Auditory Impairment: None    Vision/Perceptual:                           Acuity: Within Defined Limits    Corrective Lenses: Glasses    Range of Motion:    AROM: Within functional limits (except LLE)                         Strength:    Strength: Generally decreased, functional (except LLE)                Coordination:  Coordination: Within functional limits  Fine Motor Skills-Upper: Left Intact; Right Intact    Gross Motor Skills-Upper: Left Intact; Right Intact    Tone & Sensation:    Tone: Normal  Sensation: Intact                      Balance:  Sitting: Impaired  Sitting - Static: Good (unsupported)  Sitting - Dynamic: Fair (occasional)  Standing: Impaired; With support  Standing - Static: Fair;Constant support  Standing - Dynamic :  (pt unable to advance LLE/weight shift d/t extreme pain)    Functional Mobility and Transfers for ADLs:  Bed Mobility:  Supine to Sit:  (pt recieved in bedside chair)  Sit to Supine:  (pt remained in bedside chair)    Transfers:  Sit to Stand: Minimum assistance; Additional time (VC/encourgament/support from RW)  Stand to Sit: Minimum assistance; Additional time (VC )  Toilet Transfer : Moderate assistance; Additional time (inferred, to [facility])    ADL Assessment:    Feeding: Setup    Oral Facial Hygiene/Grooming: Setup (seated)    Bathing: Moderate assistance;Maximum assistance; Additional time (inferred, for LB reach)    Upper Body Dressing: Setup    Lower Body Dressing: Maximum assistance; Additional time; Adaptive equipment    Toileting: Moderate assistance; Additional time (inferred, for transfer)                ADL Intervention and task modifications:    Feeding  Feeding Assistance: Set-up Upper Body [address]: Set-up    Lower Body Dressing Assistance  Underpants: Maximum assistance  Socks: Maximum assistance  Position Performed: Seated in chair  Cues: Physical assistance;Verbal cues provided;Visual cues provided  Adaptive Equipment Used: Sock aid;Dressing stick; Reacher              Dressing joint: Patient instructed and demonstrated to don/doff Left LE first/last with verbal cues. Patient instructed and demonstrated to don all clothing while sitting prior to standing, doff all clothing to knees while standing, then sit to doff clothing off from knees to feet in order to facilitate fall prevention, pain management, and energy conservation with Maximum assistance. Standing: Patient instructed and demonstrated to walk up to sink/counter top/surfaces, step into walker to increase safety of joint and fall prevention with Maximum assistance. Instructed to apply concept of hip contraindications to ADLs within the home (no extreme reaching across body to Left side, square off while using objects, slide objects along surfaces). Patient instructed to increase amount of time standing, observe standing position during ADLs in order to increase even weight bearing through bilateral LEs in order to increase independence with ADLs. Goal to be reached 30 days post - op, per orthopedic surgeon or per PT. Patient indicated understanding. Functional Measure:    Barthel Index:  Bathin  Bladder: 10  Bowels: 10  Groomin  Dressin  Feedin  Mobility: 0  Stairs: 0  Toilet Use: 5  Transfer (Bed to Chair and Back): 5  Total: 45/100      The Barthel ADL Index: Guidelines  1. The index should be used as a record of what a patient does, not as a record of what a patient could do. 2. The main aim is to establish degree of independence from any help, physical or verbal, however minor and for whatever reason.   3. The need for supervision renders the patient not independent. 4. A patient's performance should be established using the best available evidence. Asking the patient, friends/relatives and nurses are the usual sources, but direct observation and common sense are also important. However direct testing is not needed. 5. Usually the patient's performance over the preceding 24-48 hours is important, but occasionally longer periods will be relevant. 6. Middle categories imply that the patient supplies over 50 per cent of the effort. 7. Use of aids to be independent is allowed. Score Interpretation (from [address] [address])    Independent   60-79 Minimally independent   40-59 Partially dependent   20-39 Very dependent   <20 Totally dependent     -Caleb Mancuso., Barthel, D.W. (1965). Functional evaluation: the Barthel Index. [address] [facility] ([address] (1997). The Barthel activities of daily living index: self-reporting versus actual performance in the old (> or = 75 years). Journal of [address] [place] [address] 45(7), 14 [facility], JTERIF, Rickey Tavarez., Maame Garcia. (1999). Measuring the change in disability after inpatient rehabilitation; comparison of the responsiveness of the Barthel Index and Functional Jackson Measure. Journal of Neurology, Neurosurgery, and Psychiatry, 66(4), 982-823. Magalis Dwyer, N.J.A, AVTAR Triplett, & Lydia Delacruz MWILLIE. (2004) Assessment of post-stroke quality of life in cost-effectiveness studies: The usefulness of the Barthel Index and the EuroQoL-5D.  Quality of Life Research, 15, 926-12         Occupational Therapy Evaluation Charge Determination   History Examination Decision-Making   LOW Complexity : Brief history review  LOW Complexity : 1-3 performance deficits relating to physical, cognitive , or psychosocial skils that result in activity limitations and / or participation restrictions  MEDIUM Complexity : Patient may present with comorbidities that affect occupational performnce. Miniml to moderate modification of tasks or assistance (eg, physical or verbal ) with assesment(s) is necessary to enable patient to complete evaluation       Based on the above components, the patient evaluation is determined to be of the following complexity level: LOW   Pain Rating:  Pt screaming out with functional movement (weight shifting while seated in chair) and when attempting sit<>stand. Subsides with repositioning and VC for breathing techniques. RN aware of pain and administering medications on schedule. Activity Tolerance:   Fair    After treatment patient left in no apparent distress:    Sitting in chair, Call bell within reach and Bed / chair alarm activated    COMMUNICATION/EDUCATION:   The patients plan of care was discussed with: Registered nurse and Case management. Patient/family agree to work toward stated goals and plan of care. This patients plan of care is appropriate for delegation to [facility].     Thank you for this referral.  Kapil Crisostomo OT  Time Calculation: 30 mins

## 2021-12-24 LAB
ANION GAP SERPL CALC-SCNC: 2 MMOL/L (ref 5–15)
BASOPHILS # BLD: 0 K/UL (ref 0–0.1)
BASOPHILS NFR BLD: 0 % (ref 0–1)
BUN SERPL-MCNC: 7 MG/DL (ref 6–20)
BUN/CREAT SERPL: 15 (ref 12–20)
CALCIUM SERPL-MCNC: 8.7 MG/DL (ref 8.5–10.1)
CHLORIDE SERPL-SCNC: 111 MMOL/L (ref 97–108)
CO2 SERPL-SCNC: 25 MMOL/L (ref 21–32)
CREAT SERPL-MCNC: 0.46 MG/DL (ref 0.55–1.02)
DIFFERENTIAL METHOD BLD: ABNORMAL
EOSINOPHIL # BLD: 0.1 K/UL (ref 0–0.4)
EOSINOPHIL NFR BLD: 1 % (ref 0–7)
ERYTHROCYTE [DISTWIDTH] IN BLOOD BY AUTOMATED COUNT: 12 % (ref 11.5–14.5)
GLUCOSE SERPL-MCNC: 97 MG/DL (ref 65–100)
HCT VFR BLD AUTO: 27.6 % (ref 35–47)
HGB BLD-MCNC: 9.1 G/DL (ref 11.5–16)
IMM GRANULOCYTES # BLD AUTO: 0 K/UL (ref 0–0.04)
IMM GRANULOCYTES NFR BLD AUTO: 0 % (ref 0–0.5)
LYMPHOCYTES # BLD: 1.4 K/UL (ref 0.8–3.5)
LYMPHOCYTES NFR BLD: 23 % (ref 12–49)
MCH RBC QN AUTO: 33.7 PG (ref 26–34)
MCHC RBC AUTO-ENTMCNC: 33 G/DL (ref 30–36.5)
MCV RBC AUTO: 102.2 FL (ref 80–99)
MONOCYTES # BLD: 0.7 K/UL (ref 0–1)
MONOCYTES NFR BLD: 11 % (ref 5–13)
NEUTS SEG # BLD: 3.9 K/UL (ref 1.8–8)
NEUTS SEG NFR BLD: 65 % (ref 32–75)
NRBC # BLD: 0 K/UL (ref 0–0.01)
NRBC BLD-RTO: 0 PER 100 WBC
PLATELET # BLD AUTO: 219 K/UL (ref 150–400)
PMV BLD AUTO: 9.3 FL (ref 8.9–12.9)
POTASSIUM SERPL-SCNC: 4.3 MMOL/L (ref 3.5–5.1)
RBC # BLD AUTO: 2.7 M/UL (ref 3.8–5.2)
SODIUM SERPL-SCNC: 138 MMOL/L (ref 136–145)
WBC # BLD AUTO: 6.1 K/UL (ref 3.6–11)

## 2021-12-24 PROCEDURE — 74011250637 HC RX REV CODE- 250/637: Performed by: ORTHOPAEDIC SURGERY

## 2021-12-24 PROCEDURE — 97530 THERAPEUTIC ACTIVITIES: CPT

## 2021-12-24 PROCEDURE — 36415 COLL VENOUS BLD VENIPUNCTURE: CPT

## 2021-12-24 PROCEDURE — 65270000029 HC RM PRIVATE

## 2021-12-24 PROCEDURE — 80048 BASIC METABOLIC PNL TOTAL CA: CPT

## 2021-12-24 PROCEDURE — 97535 SELF CARE MNGMENT TRAINING: CPT

## 2021-12-24 PROCEDURE — 85025 COMPLETE CBC W/AUTO DIFF WBC: CPT

## 2021-12-24 RX ADMIN — ACETAMINOPHEN 650 MG: 325 TABLET ORAL at 23:39

## 2021-12-24 RX ADMIN — ACETAMINOPHEN 650 MG: 325 TABLET ORAL at 00:06

## 2021-12-24 RX ADMIN — OXYCODONE HYDROCHLORIDE 5 MG: 5 TABLET ORAL at 02:51

## 2021-12-24 RX ADMIN — ASPIRIN 325 MG: 325 TABLET, COATED ORAL at 09:17

## 2021-12-24 RX ADMIN — ACETAMINOPHEN 650 MG: 325 TABLET ORAL at 18:30

## 2021-12-24 RX ADMIN — ASPIRIN 325 MG: 325 TABLET, COATED ORAL at 18:30

## 2021-12-24 RX ADMIN — Medication 10 ML: at 07:13

## 2021-12-24 RX ADMIN — OXYCODONE HYDROCHLORIDE 5 MG: 5 TABLET ORAL at 23:39

## 2021-12-24 RX ADMIN — SERTRALINE 50 MG: 50 TABLET, FILM COATED ORAL at 09:17

## 2021-12-24 RX ADMIN — Medication 10 ML: at 14:00

## 2021-12-24 RX ADMIN — OXYCODONE HYDROCHLORIDE 5 MG: 5 TABLET ORAL at 18:34

## 2021-12-24 RX ADMIN — OYSTER SHELL CALCIUM WITH VITAMIN D 1 TABLET: 500; 200 TABLET, FILM COATED ORAL at 17:00

## 2021-12-24 RX ADMIN — DOCUSATE SODIUM 50 MG AND SENNOSIDES 8.6 MG 1 TABLET: 8.6; 5 TABLET, FILM COATED ORAL at 09:17

## 2021-12-24 RX ADMIN — Medication 10 ML: at 23:41

## 2021-12-24 RX ADMIN — OXYCODONE HYDROCHLORIDE 5 MG: 5 TABLET ORAL at 07:13

## 2021-12-24 RX ADMIN — ACETAMINOPHEN 650 MG: 325 TABLET ORAL at 07:13

## 2021-12-24 RX ADMIN — OYSTER SHELL CALCIUM WITH VITAMIN D 1 TABLET: 500; 200 TABLET, FILM COATED ORAL at 08:00

## 2021-12-24 RX ADMIN — OYSTER SHELL CALCIUM WITH VITAMIN D 1 TABLET: 500; 200 TABLET, FILM COATED ORAL at 13:49

## 2021-12-24 RX ADMIN — POLYETHYLENE GLYCOL 3350 17 G: 17 POWDER, FOR SOLUTION ORAL at 09:18

## 2021-12-24 RX ADMIN — OXYCODONE HYDROCHLORIDE 5 MG: 5 TABLET ORAL at 13:49

## 2021-12-24 RX ADMIN — DOCUSATE SODIUM 50 MG AND SENNOSIDES 8.6 MG 1 TABLET: 8.6; 5 TABLET, FILM COATED ORAL at 18:30

## 2021-12-24 RX ADMIN — LISINOPRIL 10 MG: 10 TABLET ORAL at 09:17

## 2021-12-24 RX ADMIN — ACETAMINOPHEN 650 MG: 325 TABLET ORAL at 13:49

## 2021-12-24 NOTE — PROGRESS NOTES
Problem: Mobility Impaired (Adult and Pediatric)  Goal: *Acute Goals and Plan of Care (Insert Text)  Description: FUNCTIONAL STATUS PRIOR TO ADMISSION: Patient was independent and active without use of DME.    HOME SUPPORT PRIOR TO ADMISSION: The patient lived alone with family close by  however pt did not require assist for ADL's. Physical Therapy Goals  Initiated 12/23/2021  1. Patient will move from supine to sit and sit to supine  and scoot up and down in bed with modified independence within 7 day(s). 2.  Patient will transfer from bed to chair and chair to bed with modified independence using the least restrictive device within 7 day(s). 3.  Patient will perform sit to stand with modified independence within 7 day(s). 4.  Patient will ambulate with modified independence for > 150 feet with the least restrictive device within 7 day(s). 5.  Patient will ascend/descend 4 stairs with one handrail(s) with modified independence within 7 day(s). Outcome: Progressing Towards Goal     PHYSICAL THERAPY TREATMENT  Patient: Lashae Baldwin (45 y.o. female)  Date: 12/24/2021  Diagnosis: Hip fracture ([address] Courtney Pool <principal problem not specified>  Procedure(s) (LRB):  LEFT HIP TFNA NAIL INSERTION  SYNTHES (Left) 2 Days Post-Op  Precautions: Fall,WBAT  Chart, physical therapy assessment, plan of care and goals were reviewed. ASSESSMENT  Patient continues with skilled PT services and is progressing towards goals. Pt received supine in bed and willing to work with therapy. This session was a co-treat with OT. Pt continues to be limited by pain, decreased strength and balance with mobility this session along with somewhat impulsiveness. Pt showed improvement with bed mobility to L side EOB with min A with LLE support only with HOB elevated. Pt continued with STS to RW with CGA. Pt continued with weight shifts, fwrd/retro amb ~5', and pivot seat to recliner with CGA.  Pt completed session seated in chair with OT,to continue therapy with all needs met the time. RN notified of session. Current Level of Function Impacting Discharge (mobility/balance): min A for bed mobility, CGA for STS, amb up to 5' with RW    Other factors to consider for discharge: fall risk, history of falls         PLAN :  Patient continues to benefit from skilled intervention to address the above impairments. Continue treatment per established plan of care. to address goals. Recommendation for discharge: (in order for the patient to meet his/her long term goals)  Therapy 3 hours per day 5-7 days per week    This discharge recommendation:  Has not yet been discussed the attending provider and/or case management    IF patient discharges home will need the following DME: to be determined (TBD)       SUBJECTIVE:   Patient stated Alex Angolan took some of my brain.     OBJECTIVE DATA SUMMARY:   Critical Behavior:  Neurologic State: Alert  Orientation Level: Oriented X4  Cognition: Appropriate decision making     Functional Mobility Training:  Bed Mobility:  Supine to Sit: Minimum assistance  Scooting: Contact guard assistance     Transfers:  Sit to Stand: Contact guard assistance; Additional time  Stand to Sit: Contact guard assistance; Additional time  Bed to Chair: Contact guard assistance; Additional time    Balance:  Sitting: Intact  Standing: Impaired  Standing - Static: Constant support;Good;Fair  Standing - Dynamic : Constant support;Good;Fair    Ambulation/Gait Training:  Distance (ft): 5 Feet (ft) (fwrd/retro )  Assistive Device: Gait belt;Walker, rolling  Ambulation - Level of Assistance: Contact guard assistance; Additional time  Gait Abnormalities: Antalgic;Decreased step clearance  Base of Support: Center of gravity altered;Narrowed  Stance: Left decreased  Speed/Autumn: Pace decreased (<100 feet/min); Slow  Step Length: Left shortened;Right shortened         Pain Rating:  Indicated pain but no number    Activity Tolerance:   Fair and requires rest breaks      After treatment patient left in no apparent distress:   Sitting in chair and continuing therapy with OT    COMMUNICATION/COLLABORATION:   The patients plan of care was discussed with: Occupational therapist and Registered nurse.      Ozzy Camp PTA   Time Calculation: 16 mins

## 2021-12-24 NOTE — PROGRESS NOTES
[facility] Adult  Hospitalist Group                                                                                          Hospitalist Progress Note  Natalya Martínez NP  Answering service: [phone] from in house phone        Date of Service:  2021  NAME:  Maxim Coyne  :  1955  MRN:  [id number]      Admission Summary:   Rosalva Bateman is a 61 y. o. female who presents with hip pain  Patient reports that she was watching Netflix felt warm this morning, got up to go to the bathroom, and stepped on her shoelace, tripped and fell, started having some left knee pain and left hip pain, woke up this morning and had significant left hip pain to the point where she could not move, got concerned came to the ER, patient was found to have a femoral fracture and was requested to be admitted to the hospital service, patient denies any loss of consciousness    Interval history / Subjective:    Seen and examined patient sitting up in bed. States that she is feeling good today. Left hip surgical dressing is CDI- LLE + for sensation, circulation and movement. States pain is controlled. No new complaints. No overnight events noted. Encouraged IS use. States she will update her family members today and does not want me to call anyone. Assessment & Plan:     Left intertrochanteric hip fracture   - s/p Left hip intramedullar nail   - Continue with pain management   - PT/OT following   - Ortho following   - Encourage IS usage     Acute blood loss anemia   - Secondary to surgery   - Hgb 9.7 -> 9.1  - Monitor labs and transfuse for hgb <7.0      Hypertension   - Stable   - Continue lisinopril   - Hydralazine PRN   - Monitor vital signs per unit routine      Hypokalemia, resolved   - Potassium 4.3  - Replaced.    - Monitor labs and replace as needed.      Tobacco abuse   - Smoking cessation counseling provided   - Refused nicotine patch      Code status: Full   DVT prophylaxis: Aspirin Care Plan discussed with: Patient/Family and Nurse  Anticipated Disposition: [facility]/Rehab  Anticipated Discharge: [facility] for placement. Hospital Problems  Date Reviewed: 1/29/2020          Codes Class Noted POA    Hip fracture [facility]) ICD-10-CM: J04.521S  ICD-9-CM: 820.8  12/21/2021 Unknown                Review of Systems:   A comprehensive review of systems was negative except for that written in the HPI. Vital Signs:    Last 24hrs VS reviewed since prior progress note. Most recent are:  Visit Vitals  BP (!) 149/69 (BP 1 Location: Left upper arm, BP Patient Position: At rest)   Pulse 80   Temp 99.9 °F (37.7 °C)   Resp 18   Ht 5' 6\" (1.676 m)   Wt 53.5 kg (118 lb)   SpO2 95%   BMI 19.05 kg/m²       No intake or output data in the 24 hours ending 12/24/21 1120     Physical Examination:             Constitutional:  No acute distress, cooperative, pleasant    ENT:  Oral mucosa moist, oropharynx benign. Resp:  CTA bilaterally. No wheezing/rhonchi/rales. No accessory muscle use   CV:  Regular rhythm, normal rate, no murmurs, gallops, rubs    GI:  Soft, non distended, non tender. normoactive bowel sounds    Musculoskeletal:  No edema, warm, 2+ pulses throughout. Left hip surgical dressing is CDI. LLE is positive for sensation, circulation and movement. Neurologic:  Moves all extremities. AAOx3, CN II-XII reviewed     Psych:  Not anxious or agitated       Data Review:    Review and/or order of clinical lab test  Review and/or order of tests in the medicine section of CPT      Labs:     Recent Labs     12/24/21  0254 12/23/21 0220   WBC 6.1  --    HGB 9.1* 9.7*   HCT 27.6*  --      --      Recent Labs     12/24/21  0254 12/23/21 0220    140   K 4.3 3.4*   * 110*   CO2 25 26   BUN 7 10   CREA 0.46* 0.61   GLU 97 119*   CA 8.7 8.0*     No results for input(s): ALT, AP, TBIL, TBILI, TP, ALB, GLOB, GGT, AML, LPSE in the last 72 hours.     No lab exists for component: SGOT, GPT, AMYP, HLPSE  Recent Labs     12/21/21  1703   INR 1.0   PTP 10.5      No results for input(s): FE, TIBC, PSAT, FERR in the last 72 hours. No results found for: FOL, RBCF   No results for input(s): PH, PCO2, PO2 in the last 72 hours. No results for input(s): CPK, CKNDX, TROIQ in the last 72 hours.     No lab exists for component: CPKMB  Lab Results   Component Value Date/Time    Cholesterol, total 251 (H) 07/16/2018 11:04 AM    HDL Cholesterol 81 07/16/2018 11:04 AM    LDL, calculated 150 (H) 07/16/2018 11:04 AM    Triglyceride 100 07/16/2018 11:04 AM     Lab Results   Component Value Date/Time    Glucose (POC) 126 (H) 12/21/2021 09:29 PM    Glucose (POC) 98 10/02/2011 01:42 PM     Lab Results   Component Value Date/Time    Color YELLOW/STRAW 12/21/2021 07:05 PM    Appearance CLEAR 12/21/2021 07:05 PM    Specific gravity 1.011 12/21/2021 07:05 PM    pH (UA) 6.0 12/21/2021 07:05 PM    Protein Negative 12/21/2021 07:05 PM    Glucose Negative 12/21/2021 07:05 PM    Ketone 15 (A) 12/21/2021 07:05 PM    Bilirubin Negative 12/21/2021 07:05 PM    Urobilinogen 0.2 12/21/2021 07:05 PM    Nitrites Negative 12/21/2021 07:05 PM    Leukocyte Esterase Negative 12/21/2021 07:05 PM    Epithelial cells FEW 12/21/2021 07:05 PM    Bacteria Negative 12/21/2021 07:05 PM    WBC 0-4 12/21/2021 07:05 PM    RBC 0-5 12/21/2021 07:05 PM         Medications Reviewed:     Current Facility-Administered Medications   Medication Dose Route Frequency    sodium chloride (NS) flush 5-40 mL  5-40 mL IntraVENous PRN    sodium chloride (NS) flush 5-40 mL  5-40 mL IntraVENous Q8H    sodium chloride (NS) flush 5-40 mL  5-40 mL IntraVENous PRN    acetaminophen (TYLENOL) tablet 650 mg  650 mg Oral Q6H    oxyCODONE IR (ROXICODONE) tablet 2.5 mg  2.5 mg Oral Q4H PRN    naloxone (NARCAN) injection 0.4 mg  0.4 mg IntraVENous PRN    ondansetron (ZOFRAN ODT) tablet 4 mg  4 mg Oral Q4H PRN    calcium-vitamin D (OS-DELFINO +D3) 500 mg-200 unit per tablet 1 Tablet  1 Tablet Oral TID WITH MEALS    senna-docusate (PERICOLACE) 8.6-50 mg per tablet 1 Tablet  1 Tablet Oral BID    polyethylene glycol (MIRALAX) packet 17 g  17 g Oral DAILY    bisacodyL (DULCOLAX) suppository 10 mg  10 mg Rectal DAILY PRN    aspirin delayed-release tablet 325 mg  325 mg Oral BID    lisinopriL (PRINIVIL, ZESTRIL) tablet 10 mg  10 mg Oral DAILY    sertraline (ZOLOFT) tablet 50 mg  50 mg Oral DAILY    0.9% sodium chloride infusion  75 mL/hr IntraVENous CONTINUOUS    sodium chloride (NS) flush 5-40 mL  5-40 mL IntraVENous Q8H    sodium chloride (NS) flush 5-40 mL  5-40 mL IntraVENous PRN    oxyCODONE IR (ROXICODONE) tablet 5 mg  5 mg Oral Q4H PRN    morphine injection 1 mg  1 mg IntraVENous Q4H PRN     ______________________________________________________________________  EXPECTED LENGTH OF STAY: - - -  ACTUAL LENGTH OF STAY:          3                 Maria Guadalupe Tomlinson NP

## 2021-12-24 NOTE — PROGRESS NOTES
Problem: Self Care Deficits Care Plan (Adult)  Goal: *Acute Goals and Plan of Care (Insert Text)  Description: FUNCTIONAL STATUS PRIOR TO ADMISSION: Patient was independent and active without use of DME.    HOME SUPPORT: The patient lived alone with family who lives locally, however she did not require assistance for any ADL/IADL tasks. Occupational Therapy Goals  Initiated 12/23/2021  1. Patient will perform lower body ADLs with AE supervision/set-up within 4 day(s). 2.  Patient will perform upper body ADLs standing 5 mins without fatigue or LOB with supervision/set-up within 4 day(s). 3.  Patient will perform toilet transfer with supervision/set-up within 4 day(s). 4.  Patient will perform all aspects of toileting with supervision/set-up within 4 day(s). 5.  Patient will participate in upper extremity therapeutic exercise/activities with supervision/set-up for 10 minutes within 4 day(s). 6.  Patient will utilize energy conservation techniques during functional activities without cues within 4 day(s). Outcome: Progressing Towards Goal   OCCUPATIONAL THERAPY TREATMENT  Patient: Jemal Chou (58 y.o. female)  Date: 12/24/2021  Diagnosis: Hip fracture ([address] Hema Frias <principal problem not specified>  Procedure(s) (LRB):  LEFT HIP TFNA NAIL INSERTION  SYNTHES (Left) 2 Days Post-Op  Precautions: Fall,WBAT  Chart, occupational therapy assessment, plan of care, and goals were reviewed. ASSESSMENT  Patient continues with skilled OT services and is progressing towards goals. Patient semi supine in bed upon OT arrival and agreeable to participate with therapy. Patient with improved functional mobility this date requiring min A for left lower extremity support for bed mobility and CGA for out of bed transfers.  Patient participated in lower extremity dressing at edge of bed prior to mobility to don slip on shoes with tread, patient able to don on right lower extremity, attempted to use reacher to don on left lower extremity but difficulty completing and getting frustrated with task/threatening to throw shoe across room so patient assisted to don. Patient transferred to recliner and then requesting transfer to [facility] for toileting. Patient requiring verbal cueing throughout transfers for hand placement and and kicking out left foot prior to sitting for comfort. Patient voided on BSC and SBA for pericare, min A for clothing management. Patient then transferred back to recliner and left sitting with legs elevated and ice in place. Patient would benefit from skilled OT services during admission to improve independence with self care and functional mobility/transfers. Recommend discharge to [facility] at this time. Current Level of Function Impacting Discharge (ADLs): mod A lower extremity activities of daily living, setup A upper extremity activities of daily living, CGA to min A mobility/transfers    Other factors to consider for discharge: prior level of function independent, good family support, pt reported history of falls, complicated PMH         PLAN :  Patient continues to benefit from skilled intervention to address the above impairments. Continue treatment per established plan of care to address goals. Recommend with staff: up to chair 3x/day for meals, BSC for toileting and assist with ADLs PRN    Recommendation for discharge: (in order for the patient to meet his/her long term goals)  Therapy 3 hours per day 5-7 days per week    This discharge recommendation:  Has been made in collaboration with the attending provider and/or case management    IF patient discharges home will need the following DME: shower chair, walker: rolling and increased family assistance       SUBJECTIVE:   Patient stated Robb Holden long arms needs to get it together.  When instructed to reach back for chair when sitting.     OBJECTIVE DATA SUMMARY:   Cognitive/Behavioral Status:  Neurologic State: Alert                   Functional Mobility and Transfers for ADLs:  Bed Mobility:  Supine to Sit: Minimum assistance  Scooting: Contact guard assistance    Transfers:  Sit to Stand: Contact guard assistance; Additional time  Functional Transfers  Toilet Transfer : Contact guard assistance; Additional time  Bed to Chair: Contact guard assistance; Additional time    Balance:  Sitting: Intact  Standing: Impaired  Standing - Static: Constant support;Good;Fair  Standing - Dynamic : Constant support;Good;Fair    ADL Intervention:       Grooming  Position Performed: Seated in chair  Washing Face: Set-up  Washing Hands: Set-up  Brushing Teeth: Set-up                   Lower Body Dressing Assistance  Slip on Shoes with Back: Moderate assistance  Leg Crossed Method Used: No  Position Performed: Seated edge of bed  Cues: Physical assistance;Don;Verbal cues provided (assist needed for LLE)  Adaptive Equipment Used: Reacher    Toileting  Bladder Hygiene: Stand-by assistance  Clothing Management: Minimum assistance       Patient recalled and demonstrated avoiding extreme planes of movement with Left LE during ADLs and functional mobility with verbal cues. Bathing: Patient instructed when bathing to not submerge wound in water, stand to shower or sponge bathe, cover wound with plastic and tape to ensure no water reaches bandage/wound without cues. Patient indicated understanding. Dressing joint: Patient instructed and demonstrated to don/doff Left LE first/last with verbal cues. Patient instructed to don all clothing while sitting prior to standing, doff all clothing to knees while standing, then sit to doff clothing off from knees to feet in order to facilitate fall prevention, pain management, and energy conservation. Home safety: Patient instructed on home modifications and safety (raise height of ADL objects, appropriate height of chair surfaces, recliner safety, change of floor surfaces, clear pathways) to increase independence and fall prevention.   Patient indicated understanding. Standing: Patient instructed and demonstrated to walk up to sink/counter top/surfaces, step into walker to increase safety of joint and fall prevention with Contact guard assistance. Patient educated about hip anatomy verbally and with pictures and educated to avoid internal rotation of Left LE. Pain:  Patient reporting mild pain throughout session, much improved since last working with therapy per pt. Activity Tolerance:   Fair, SpO2 stable on RA and requires rest breaks    After treatment patient left in no apparent distress:   Sitting in chair, Call bell within reach and Bed / chair alarm activated    COMMUNICATION/COLLABORATION:   The patients plan of care was discussed with: Physical therapy assistant and Registered nurse.      Kashif Burns, OTR/L  Time Calculation: 30 mins

## 2021-12-24 NOTE — PROGRESS NOTES
Problem: Pain  Goal: *Control of Pain  Outcome: Progressing Towards Goal     Problem: Falls - Risk of  Goal: *Absence of Falls  Description: Document Jessica Fall Risk and appropriate interventions in the flowsheet.   Outcome: Progressing Towards Goal  Note: Fall Risk Interventions:  Mobility Interventions: Bed/chair exit alarm,Patient to call before getting OOB         Medication Interventions: Patient to call before getting OOB    Elimination Interventions: Call light in reach    History of Falls Interventions: Door open when patient unattended         Problem: Patient Education: Go to Patient Education Activity  Goal: Patient/Family Education  Outcome: Progressing Towards Goal     Problem: Patient Education: Go to Patient Education Activity  Goal: Patient/Family Education  Outcome: Progressing Towards Goal     Problem: Patient Education: Go to Patient Education Activity  Goal: Patient/Family Education  Outcome: Progressing Towards Goal

## 2021-12-24 NOTE — PROGRESS NOTES
Ortho Daily Progress Note      Patient: Lashae Baldwin                   MRN: [id number]  Sex: female  YOB: 1955           Age: 61 y.o.    2 Days Post-Op    Procedure(s): LEFT HIP TFNA NAIL INSERTION  SYNTHES    Subjective: Doing well, pain controlled, awaiting placement     Visit Vitals  BP (!) 149/69 (BP 1 Location: Left upper arm, BP Patient Position: At rest)   Pulse 80   Temp 99.9 °F (37.7 °C)   Resp 18   Ht 5' 6\" (1.676 m)   Wt 53.5 kg (118 lb)   SpO2 95%   BMI 19.05 kg/m²        Lab Results:  HGB   Date/Time Value Ref Range Status   12/24/2021 02:54 AM 9.1 (L) 11.5 - 16.0 g/dL Final     INR   Date/Time Value Ref Range Status   12/21/2021 05:03 PM 1.0 0.9 - 1.1   Final     Comment:     A single therapeutic range for Vit K antagonists may not be optimal for all indications - see June, 2008 issue of Chest, American College [employer] Chest [employer] Evidence-Based Clinical Practice Guidelines, 8th Edition.        Physical Exam:    GENERAL: 58yo female, alert, cooperative, no distress  DRESSING: clean/dry  SWELLING: mild  NEUROLOGICAL: intact  PULSE:yes   MOTION: no pain with gentle passive ROM left hip  DVT Exam: no evidence of DVT seen on physical exam.      Plan:    DVT prophylaxisAspirin 325mg bid x 28 days  Weight bearing restriction WBAT  Pain Control:stable, mild-to-moderate joint symptoms intermittently, reasonably well controlled by current meds   Dispo: Awaiting SNF/Rehab placement  Remove staples 2 weeks post-op  Follow-up with Dr. Cathie Smiley in 3-4 weeks  Will sign off, please call with questions    LARRY Grover  12/24/2021   10:25 AM      .

## 2021-12-24 NOTE — PROGRESS NOTES
Physical Therapy    Pt received supine in bed and reported trying to get up to use the [facility] with increased pain, 8/10 with pain at rest. Pt declined therapy at this time,PT will defer at this time and will follow up as able. Rn notified of contact.      Professionally,     Bg Hunt, PTA

## 2021-12-25 LAB
ANION GAP SERPL CALC-SCNC: 3 MMOL/L (ref 5–15)
BASOPHILS # BLD: 0 K/UL (ref 0–0.1)
BASOPHILS NFR BLD: 0 % (ref 0–1)
BUN SERPL-MCNC: 11 MG/DL (ref 6–20)
BUN/CREAT SERPL: 19 (ref 12–20)
CALCIUM SERPL-MCNC: 8.8 MG/DL (ref 8.5–10.1)
CHLORIDE SERPL-SCNC: 107 MMOL/L (ref 97–108)
CO2 SERPL-SCNC: 29 MMOL/L (ref 21–32)
CREAT SERPL-MCNC: 0.58 MG/DL (ref 0.55–1.02)
DIFFERENTIAL METHOD BLD: ABNORMAL
EOSINOPHIL # BLD: 0.1 K/UL (ref 0–0.4)
EOSINOPHIL NFR BLD: 2 % (ref 0–7)
ERYTHROCYTE [DISTWIDTH] IN BLOOD BY AUTOMATED COUNT: 11.7 % (ref 11.5–14.5)
GLUCOSE SERPL-MCNC: 127 MG/DL (ref 65–100)
HCT VFR BLD AUTO: 28.7 % (ref 35–47)
HGB BLD-MCNC: 9.1 G/DL (ref 11.5–16)
IMM GRANULOCYTES # BLD AUTO: 0 K/UL (ref 0–0.04)
IMM GRANULOCYTES NFR BLD AUTO: 1 % (ref 0–0.5)
LYMPHOCYTES # BLD: 1.6 K/UL (ref 0.8–3.5)
LYMPHOCYTES NFR BLD: 31 % (ref 12–49)
MCH RBC QN AUTO: 32.7 PG (ref 26–34)
MCHC RBC AUTO-ENTMCNC: 31.7 G/DL (ref 30–36.5)
MCV RBC AUTO: 103.2 FL (ref 80–99)
MONOCYTES # BLD: 0.6 K/UL (ref 0–1)
MONOCYTES NFR BLD: 11 % (ref 5–13)
NEUTS SEG # BLD: 2.8 K/UL (ref 1.8–8)
NEUTS SEG NFR BLD: 55 % (ref 32–75)
NRBC # BLD: 0 K/UL (ref 0–0.01)
NRBC BLD-RTO: 0 PER 100 WBC
PLATELET # BLD AUTO: 252 K/UL (ref 150–400)
PMV BLD AUTO: 8.8 FL (ref 8.9–12.9)
POTASSIUM SERPL-SCNC: 3.9 MMOL/L (ref 3.5–5.1)
RBC # BLD AUTO: 2.78 M/UL (ref 3.8–5.2)
SODIUM SERPL-SCNC: 139 MMOL/L (ref 136–145)
WBC # BLD AUTO: 5.1 K/UL (ref 3.6–11)

## 2021-12-25 PROCEDURE — 74011250637 HC RX REV CODE- 250/637: Performed by: ORTHOPAEDIC SURGERY

## 2021-12-25 PROCEDURE — 94760 N-INVAS EAR/PLS OXIMETRY 1: CPT

## 2021-12-25 PROCEDURE — 80048 BASIC METABOLIC PNL TOTAL CA: CPT

## 2021-12-25 PROCEDURE — 36415 COLL VENOUS BLD VENIPUNCTURE: CPT

## 2021-12-25 PROCEDURE — 65270000029 HC RM PRIVATE

## 2021-12-25 PROCEDURE — 85025 COMPLETE CBC W/AUTO DIFF WBC: CPT

## 2021-12-25 RX ADMIN — OYSTER SHELL CALCIUM WITH VITAMIN D 1 TABLET: 500; 200 TABLET, FILM COATED ORAL at 14:19

## 2021-12-25 RX ADMIN — OXYCODONE HYDROCHLORIDE 5 MG: 5 TABLET ORAL at 14:25

## 2021-12-25 RX ADMIN — DOCUSATE SODIUM 50 MG AND SENNOSIDES 8.6 MG 1 TABLET: 8.6; 5 TABLET, FILM COATED ORAL at 10:28

## 2021-12-25 RX ADMIN — POLYETHYLENE GLYCOL 3350 17 G: 17 POWDER, FOR SOLUTION ORAL at 10:28

## 2021-12-25 RX ADMIN — Medication 10 ML: at 14:00

## 2021-12-25 RX ADMIN — ACETAMINOPHEN 650 MG: 325 TABLET ORAL at 20:03

## 2021-12-25 RX ADMIN — DOCUSATE SODIUM 50 MG AND SENNOSIDES 8.6 MG 1 TABLET: 8.6; 5 TABLET, FILM COATED ORAL at 20:03

## 2021-12-25 RX ADMIN — OXYCODONE HYDROCHLORIDE 5 MG: 5 TABLET ORAL at 10:54

## 2021-12-25 RX ADMIN — OXYCODONE HYDROCHLORIDE 5 MG: 5 TABLET ORAL at 05:38

## 2021-12-25 RX ADMIN — ACETAMINOPHEN 650 MG: 325 TABLET ORAL at 14:25

## 2021-12-25 RX ADMIN — Medication 10 ML: at 05:37

## 2021-12-25 RX ADMIN — OXYCODONE HYDROCHLORIDE 5 MG: 5 TABLET ORAL at 20:03

## 2021-12-25 RX ADMIN — OYSTER SHELL CALCIUM WITH VITAMIN D 1 TABLET: 500; 200 TABLET, FILM COATED ORAL at 07:01

## 2021-12-25 RX ADMIN — OYSTER SHELL CALCIUM WITH VITAMIN D 1 TABLET: 500; 200 TABLET, FILM COATED ORAL at 17:00

## 2021-12-25 RX ADMIN — ASPIRIN 325 MG: 325 TABLET, COATED ORAL at 10:23

## 2021-12-25 RX ADMIN — SERTRALINE 50 MG: 50 TABLET, FILM COATED ORAL at 10:23

## 2021-12-25 RX ADMIN — ACETAMINOPHEN 650 MG: 325 TABLET ORAL at 05:38

## 2021-12-25 RX ADMIN — ASPIRIN 325 MG: 325 TABLET, COATED ORAL at 20:03

## 2021-12-25 NOTE — PROGRESS NOTES
[facility]                                                                                          Hospitalist Progress Note  Torie Mills NP  Answering service: [phone] OR 36 from in house phone        Date of Service:  2021  NAME:  Jens Aggarwal  :  1955  MRN:  [id number]      Admission Summary:   Melva Bateman is a 61 y. o. female who presents with hip pain  Patient reports that she was watching Netflix felt warm this morning, got up to go to the bathroom, and stepped on her shoelace, tripped and fell, started having some left knee pain and left hip pain, woke up this morning and had significant left hip pain to the point where she could not move, got concerned came to the ER, patient was found to have a femoral fracture and was requested to be admitted to the hospital service, patient denies any loss of consciousness    Interval history / Subjective:    Seen and examined patient sitting up in bed. States that she is feeling ok. Left hip surgical dressing is CDI. LLE is + for circulation, sensation and movement. Pain is controlled. Discussed importance of working with PT as she refused yesterday. Discussed the importance of IS usage and getting out of bed for meals and to use the bathroom. Discussed with RN- Patient to be out of bed for all meals and out of bed for bathroom usage with assistance. Encourage use of IS. Per patient's request, called sister Jo Burgos ([phone]), No answer.       Assessment & Plan:     Left intertrochanteric hip fracture   - s/p Left hip intramedullar nail   - Continue with pain management   - PT/OT following   - Ortho following   - Encourage IS usage  - Out of bed for meals  - Pain control      Acute blood loss anemia   - Secondary to surgery   - Hgb 9.7 -> 9., stable  - Monitor labs and transfuse for hgb <7.0      Hypertension   - Stable   - Continue lisinopril   - Hydralazine PRN   - Monitor vital signs per unit routine      Hypokalemia, resolved   - Potassium 4.3  - Replaced. - Monitor labs and replace as needed.      Tobacco abuse   - Smoking cessation counseling provided   - Refused nicotine patch     Code status: Full   DVT prophylaxis: Aspirin     Care Plan discussed with: Patient/Family and Nurse  Anticipated Disposition: [facility]/Rehab  Anticipated Discharge: Stable for discharge, awaiting placement     Hospital Problems  Date Reviewed: 1/29/2020          Codes Class Noted POA    Hip fracture [facility]) ICD-10-CM: V81.902I  ICD-9-CM: 820.8  12/21/2021 Unknown                Review of Systems:   A comprehensive review of systems was negative except for that written in the HPI. Vital Signs:    Last 24hrs VS reviewed since prior progress note. Most recent are:  Visit Vitals  BP (!) 106/44 (BP 1 Location: Left upper arm)   Pulse 78   Temp 98.2 °F (36.8 °C)   Resp 18   Ht 5' 6\" (1.676 m)   Wt 53.5 kg (118 lb)   SpO2 98%   BMI 19.05 kg/m²       No intake or output data in the 24 hours ending 12/25/21 1109     Physical Examination:             Constitutional:  No acute distress, cooperative, pleasant    ENT:  Oral mucosa moist, oropharynx benign. Resp:  CTA bilaterally. No wheezing/rhonchi/rales. No accessory muscle use   CV:  Regular rhythm, normal rate, no murmurs, gallops, rubs    GI:  Soft, non distended, non tender. normoactive bowel sounds    Musculoskeletal:  No edema, warm, 2+ pulses throughout. LLE dressing is clean, dry and intact. LLE is + for sensation, movement and circulation. Neurologic:  Moves all extremities.   AAOx3, CN II-XII reviewed     Psych:  Not anxious or agitated       Data Review:    Review and/or order of clinical lab test  Review and/or order of tests in the medicine section of CPT      Labs:     Recent Labs     12/25/21  0536 12/24/21  0254   WBC 5.1 6.1   HGB 9.1* 9.1*   HCT 28.7* 27.6*    219     Recent Labs     12/25/21  0536 12/24/21  0254 12/23/21  0220    138 140 K 3.9 4.3 3.4*    111* 110*   CO2 29 25 26   BUN 11 7 10   CREA 0.58 0.46* 0.61   * 97 119*   CA 8.8 8.7 8.0*     No results for input(s): ALT, AP, TBIL, TBILI, TP, ALB, GLOB, GGT, AML, LPSE in the last 72 hours. No lab exists for component: SGOT, GPT, AMYP, HLPSE  No results for input(s): INR, PTP, APTT, INREXT in the last 72 hours. No results for input(s): FE, TIBC, PSAT, FERR in the last 72 hours. No results found for: FOL, RBCF   No results for input(s): PH, PCO2, PO2 in the last 72 hours. No results for input(s): CPK, CKNDX, TROIQ in the last 72 hours.     No lab exists for component: CPKMB  Lab Results   Component Value Date/Time    Cholesterol, total 251 (H) 07/16/2018 11:04 AM    HDL Cholesterol 81 07/16/2018 11:04 AM    LDL, calculated 150 (H) 07/16/2018 11:04 AM    Triglyceride 100 07/16/2018 11:04 AM     Lab Results   Component Value Date/Time    Glucose (POC) 126 (H) 12/21/2021 09:29 PM    Glucose (POC) 98 10/02/2011 01:42 PM     Lab Results   Component Value Date/Time    Color YELLOW/STRAW 12/21/2021 07:05 PM    Appearance CLEAR 12/21/2021 07:05 PM    Specific gravity 1.011 12/21/2021 07:05 PM    pH (UA) 6.0 12/21/2021 07:05 PM    Protein Negative 12/21/2021 07:05 PM    Glucose Negative 12/21/2021 07:05 PM    Ketone 15 (A) 12/21/2021 07:05 PM    Bilirubin Negative 12/21/2021 07:05 PM    Urobilinogen 0.2 12/21/2021 07:05 PM    Nitrites Negative 12/21/2021 07:05 PM    Leukocyte Esterase Negative 12/21/2021 07:05 PM    Epithelial cells FEW 12/21/2021 07:05 PM    Bacteria Negative 12/21/2021 07:05 PM    WBC 0-4 12/21/2021 07:05 PM    RBC 0-5 12/21/2021 07:05 PM         Medications Reviewed:     Current Facility-Administered Medications   Medication Dose Route Frequency    sodium chloride (NS) flush 5-40 mL  5-40 mL IntraVENous PRN    sodium chloride (NS) flush 5-40 mL  5-40 mL IntraVENous Q8H    sodium chloride (NS) flush 5-40 mL  5-40 mL IntraVENous PRN    acetaminophen (TYLENOL) tablet 650 mg  650 mg Oral Q6H    oxyCODONE IR (ROXICODONE) tablet 2.5 mg  2.5 mg Oral Q4H PRN    naloxone (NARCAN) injection 0.4 mg  0.4 mg IntraVENous PRN    ondansetron (ZOFRAN ODT) tablet 4 mg  4 mg Oral Q4H PRN    calcium-vitamin D (OS-DELFINO +D3) 500 mg-200 unit per tablet 1 Tablet  1 Tablet Oral TID WITH MEALS    senna-docusate (PERICOLACE) 8.6-50 mg per tablet 1 Tablet  1 Tablet Oral BID    polyethylene glycol (MIRALAX) packet 17 g  17 g Oral DAILY    bisacodyL (DULCOLAX) suppository 10 mg  10 mg Rectal DAILY PRN    aspirin delayed-release tablet 325 mg  325 mg Oral BID    lisinopriL (PRINIVIL, ZESTRIL) tablet 10 mg  10 mg Oral DAILY    sertraline (ZOLOFT) tablet 50 mg  50 mg Oral DAILY    0.9% sodium chloride infusion  75 mL/hr IntraVENous CONTINUOUS    sodium chloride (NS) flush 5-40 mL  5-40 mL IntraVENous Q8H    sodium chloride (NS) flush 5-40 mL  5-40 mL IntraVENous PRN    oxyCODONE IR (ROXICODONE) tablet 5 mg  5 mg Oral Q4H PRN    morphine injection 1 mg  1 mg IntraVENous Q4H PRN     ______________________________________________________________________  EXPECTED LENGTH OF STAY: - - -  ACTUAL LENGTH OF STAY:          4                 [facility], NP

## 2021-12-26 PROCEDURE — 97535 SELF CARE MNGMENT TRAINING: CPT

## 2021-12-26 PROCEDURE — 97116 GAIT TRAINING THERAPY: CPT

## 2021-12-26 PROCEDURE — 74011250637 HC RX REV CODE- 250/637: Performed by: ORTHOPAEDIC SURGERY

## 2021-12-26 PROCEDURE — 65270000029 HC RM PRIVATE

## 2021-12-26 PROCEDURE — 97530 THERAPEUTIC ACTIVITIES: CPT

## 2021-12-26 RX ADMIN — OXYCODONE HYDROCHLORIDE 5 MG: 5 TABLET ORAL at 01:51

## 2021-12-26 RX ADMIN — ASPIRIN 325 MG: 325 TABLET, COATED ORAL at 18:46

## 2021-12-26 RX ADMIN — OXYCODONE HYDROCHLORIDE 5 MG: 5 TABLET ORAL at 07:31

## 2021-12-26 RX ADMIN — DOCUSATE SODIUM 50 MG AND SENNOSIDES 8.6 MG 1 TABLET: 8.6; 5 TABLET, FILM COATED ORAL at 18:46

## 2021-12-26 RX ADMIN — OXYCODONE HYDROCHLORIDE 2.5 MG: 5 TABLET ORAL at 19:14

## 2021-12-26 RX ADMIN — ACETAMINOPHEN 650 MG: 325 TABLET ORAL at 11:55

## 2021-12-26 RX ADMIN — OYSTER SHELL CALCIUM WITH VITAMIN D 1 TABLET: 500; 200 TABLET, FILM COATED ORAL at 18:46

## 2021-12-26 RX ADMIN — Medication 10 ML: at 22:35

## 2021-12-26 RX ADMIN — ACETAMINOPHEN 650 MG: 325 TABLET ORAL at 18:46

## 2021-12-26 RX ADMIN — Medication 10 ML: at 22:36

## 2021-12-26 RX ADMIN — SERTRALINE 50 MG: 50 TABLET, FILM COATED ORAL at 11:55

## 2021-12-26 RX ADMIN — Medication 10 ML: at 14:00

## 2021-12-26 RX ADMIN — ACETAMINOPHEN 650 MG: 325 TABLET ORAL at 06:30

## 2021-12-26 RX ADMIN — ACETAMINOPHEN 650 MG: 325 TABLET ORAL at 01:50

## 2021-12-26 RX ADMIN — OYSTER SHELL CALCIUM WITH VITAMIN D 1 TABLET: 500; 200 TABLET, FILM COATED ORAL at 11:55

## 2021-12-26 RX ADMIN — LISINOPRIL 10 MG: 10 TABLET ORAL at 11:55

## 2021-12-26 RX ADMIN — OYSTER SHELL CALCIUM WITH VITAMIN D 1 TABLET: 500; 200 TABLET, FILM COATED ORAL at 07:30

## 2021-12-26 RX ADMIN — OXYCODONE HYDROCHLORIDE 2.5 MG: 5 TABLET ORAL at 11:55

## 2021-12-26 RX ADMIN — POLYETHYLENE GLYCOL 3350 17 G: 17 POWDER, FOR SOLUTION ORAL at 11:55

## 2021-12-26 RX ADMIN — ASPIRIN 325 MG: 325 TABLET, COATED ORAL at 11:55

## 2021-12-26 NOTE — PROGRESS NOTES
CM reviewed chart and received call from [facility] asking if pt was ready for discharge. ARMIDA spoke with Hospitalist NP to confirm that pt was ready for discharge. CM called [facility] back and was informed that they only had 1 bed available and they filled it, plan to have bed for pt tomorrow (Monday).   AALIYAH Pedroza, ACM

## 2021-12-26 NOTE — PROGRESS NOTES
Problem: Mobility Impaired (Adult and Pediatric)  Goal: *Acute Goals and Plan of Care (Insert Text)  Description: FUNCTIONAL STATUS PRIOR TO ADMISSION: Patient was independent and active without use of DME.    HOME SUPPORT PRIOR TO ADMISSION: The patient lived alone with family close by  however pt did not require assist for ADL's. Physical Therapy Goals  Initiated 12/23/2021  1. Patient will move from supine to sit and sit to supine  and scoot up and down in bed with modified independence within 7 day(s). 2.  Patient will transfer from bed to chair and chair to bed with modified independence using the least restrictive device within 7 day(s). 3.  Patient will perform sit to stand with modified independence within 7 day(s). 4.  Patient will ambulate with modified independence for > 150 feet with the least restrictive device within 7 day(s). 5.  Patient will ascend/descend 4 stairs with one handrail(s) with modified independence within 7 day(s). Outcome: Progressing Towards Goal   PHYSICAL THERAPY TREATMENT  Patient: Jon Hinson (89 y.o. female)  Date: 12/26/2021  Diagnosis: Hip fracture ([address] Peggy Lapping <principal problem not specified>  Procedure(s) (LRB):  LEFT HIP TFNA NAIL INSERTION  SYNTHES (Left) 4 Days Post-Op  Precautions: Fall,WBAT  Chart, physical therapy assessment, plan of care and goals were reviewed. ASSESSMENT  Patient continues with skilled PT services and is progressing towards goals. Patient upset because she had wet the bed. Presented partially on edge of bed. Performed bed mobility, transfers, gait with RW and gait belt. Remained up in recliner at end of session. Patient eager to participate today. Improving with less assistance required for mobility, ambulation distance increased. Good Rehab Candidate due to independent PLOF.     Current Level of Function Impacting Discharge (mobility/balance): Contact guard-minimal assistance for supine to sit on edge of bed, additional time, use of bed rail and assist for LLE management. Ambulated 40 ft slowly with RW and gait belt, wide based \"stiff-legged, waddly\" but steady gait. Cues for longer stride lengths, left knee flexion during swing, proper sequence, pushing RW farther in front of her trunk. Other factors to consider for discharge: Far from independent PLOF/Lives alone/Motivated/A & O x 4/Supportive Family          PLAN :  Patient continues to benefit from skilled intervention to address the above impairments. Continue treatment per established plan of care. to address goals. Recommendation for discharge: (in order for the patient to meet his/her long term goals)  Therapy 3 hours per day 5-7 days per week    This discharge recommendation:  Has been made in collaboration with the attending provider and/or case management    IF patient discharges home will need the following DME: rolling walker       SUBJECTIVE:   Patient stated I need to get up out of bed.     OBJECTIVE DATA SUMMARY:   Critical Behavior:  Neurologic State: Alert,Appropriate for age,Eyes open spontaneously  Orientation Level: Appropriate for age,Oriented to person,Oriented to place,Oriented to situation,Oriented to time,Oriented X4  Cognition: Appropriate decision making,Appropriate for age attention/concentration,Appropriate safety awareness,Follows commands     Functional Mobility Training:  Bed Mobility:     Supine to Sit: Contact guard assistance  Sit to Supine:  (Left up in recliner at bedside)  Scooting: Contact guard assistance        Transfers:  Sit to Stand: Contact guard assistance  Stand to Sit: Contact guard assistance        Bed to Chair: Contact guard assistance                    Balance:  Sitting: Intact  Sitting - Static: Good (unsupported)  Sitting - Dynamic: Good (unsupported)  Standing: Impaired  Standing - Static: Good;Constant support  Standing - Dynamic : Good;Constant support  Ambulation/Gait Training:  Distance (ft): 40 Feet (ft)  Assistive Device: Walker, rolling;Gait belt  Ambulation - Level of Assistance: Contact guard assistance        Gait Abnormalities: Antalgic;Decreased step clearance (slightly \"waddly\" gait)     Left Side Weight Bearing: As tolerated  Base of Support: Widened;Center of gravity altered  Stance: Left decreased (stiff right knee during swing)  Speed/Autumn: Slow  Step Length: Right shortened  Swing Pattern: Left asymmetrical     Interventions: Safety awareness training; Tactile cues; Verbal cues              Therapeutic Exercises: Ankle Pumps    Pain Ratin/10 with movement out of bed and weight bearing; 1-2/10 at rest    Activity Tolerance:   Good    After treatment patient left in no apparent distress:   Sitting in chair, Call bell within reach, Bed / chair alarm activated, and nurse notified and PCT notified. COMMUNICATION/COLLABORATION:   The patients plan of care was discussed with: Registered nurse, Physician, and Certified nursing assistant/patient care technician.      Bao Barbour   Time Calculation: 40 mins

## 2021-12-26 NOTE — PROGRESS NOTES
Problem: Self Care Deficits Care Plan (Adult)  Goal: *Acute Goals and Plan of Care (Insert Text)  Description: FUNCTIONAL STATUS PRIOR TO ADMISSION: Patient was independent and active without use of DME.    HOME SUPPORT: The patient lived alone with family who lives locally, however she did not require assistance for any ADL/IADL tasks. Occupational Therapy Goals  Initiated 12/23/2021  1. Patient will perform lower body ADLs with AE supervision/set-up within 4 day(s). 2.  Patient will perform upper body ADLs standing 5 mins without fatigue or LOB with supervision/set-up within 4 day(s). 3.  Patient will perform toilet transfer with supervision/set-up within 4 day(s). 4.  Patient will perform all aspects of toileting with supervision/set-up within 4 day(s). 5.  Patient will participate in upper extremity therapeutic exercise/activities with supervision/set-up for 10 minutes within 4 day(s). 6.  Patient will utilize energy conservation techniques during functional activities without cues within 4 day(s). Outcome: Progressing Towards Goal   OCCUPATIONAL THERAPY TREATMENT  Patient: Diana Youngblood (89 y.o. female)  Date: 12/26/2021  Diagnosis: Hip fracture ([address] Cady Courser <principal problem not specified>  Procedure(s) (LRB):  LEFT HIP TFNA NAIL INSERTION  SYNTHES (Left) 4 Days Post-Op  Precautions: Fall,WBAT  Chart, occupational therapy assessment, plan of care, and goals were reviewed. ASSESSMENT  Patient continues with skilled OT services and is progressing towards goals. ADLs limited by standing tolerance, functional reach, pain management, overall endurance s/p L hip nail. Current Level of Function Impacting Discharge (ADLs): moderate assistance lower body ADL:s    Other factors to consider for discharge: family         PLAN :  Patient continues to benefit from skilled intervention to address the above impairments.   Continue treatment per established plan of care to address goals. Recommend with staff: continued toileting in bathroom, bathing self    Recommend next OT session: standing gathering ADLs    Recommendation for discharge: (in order for the patient to meet his/her long term goals)  Therapy 3 hours per day 5-7 days per week    This discharge recommendation:  Has been made in collaboration with the attending provider and/or case management    IF patient discharges home will need the following DME: AE: long handled bathing and AE: long handled dressing       SUBJECTIVE:   Patient stated Oh that would be great (washing up).     OBJECTIVE DATA SUMMARY:   Cognitive/Behavioral Status:                      Functional Mobility and Transfers for ADLs:  Bed Mobility:  Supine to Sit: Contact guard assistance  Sit to Supine:  (Left up in recliner at bedside)  Scooting: Contact guard assistance    Transfers:  Sit to Stand: Contact guard assistance  Functional Transfers  Bathroom Mobility: Minimum assistance (setup door, lights, RW)  Toilet Transfer : Minimum assistance (BSC immediately 2* urge urination)  Shower Transfer: Total assistance (not safe at this time, sitting at sink)  Bed to Chair: Contact guard assistance    Balance:  Sitting: Intact  Sitting - Static: Good (unsupported)  Sitting - Dynamic: Good (unsupported)  Standing: Impaired  Standing - Static: Good;Constant support  Standing - Dynamic : Good;Constant support    ADL Intervention:  Feeding  Feeding Assistance: Independent    Grooming  Position Performed: Seated in chair  Washing Face: Modified independent  Washing Hands: Modified independent  Brushing Teeth: Modified independent  Brushing/Combing Hair: Modified independent    Upper Body Bathing  Bathing Assistance: Set-up  Position Performed: Seated in chair    Lower Body Bathing  Perineal  : Minimum assistance  Position Performed: Standing  Lower Body :  Moderate assistance  Position Performed: Seated in chair   Assist feet; total assistance socks without AE    Upper Body [address]: Set-up         Toileting  Bladder Hygiene: Modified independent  Bowel Hygiene: Modified indpendent  Clothing Management: Minimum assistance         Therapeutic Exercises:       Pain:  L with ice upon arrival and left    Activity Tolerance:   Good    After treatment patient left in no apparent distress:   Sitting in chair, Call bell within reach, and Bed / chair alarm activated    COMMUNICATION/COLLABORATION:   The patients plan of care was discussed with: Physical therapist and Registered nurse.      Roger Mackey  Time Calculation: 26 mins

## 2021-12-27 VITALS
BODY MASS INDEX: 17.55 KG/M2 | SYSTOLIC BLOOD PRESSURE: 146 MMHG | HEIGHT: 66 IN | WEIGHT: 109.2 LBS | DIASTOLIC BLOOD PRESSURE: 76 MMHG | TEMPERATURE: 99.1 F | OXYGEN SATURATION: 97 % | RESPIRATION RATE: 18 BRPM | HEART RATE: 84 BPM

## 2021-12-27 LAB
ANION GAP SERPL CALC-SCNC: 2 MMOL/L (ref 5–15)
BASOPHILS # BLD: 0 K/UL (ref 0–0.1)
BASOPHILS NFR BLD: 0 % (ref 0–1)
BUN SERPL-MCNC: 13 MG/DL (ref 6–20)
BUN/CREAT SERPL: 31 (ref 12–20)
CALCIUM SERPL-MCNC: 8.8 MG/DL (ref 8.5–10.1)
CHLORIDE SERPL-SCNC: 109 MMOL/L (ref 97–108)
CO2 SERPL-SCNC: 30 MMOL/L (ref 21–32)
CREAT SERPL-MCNC: 0.42 MG/DL (ref 0.55–1.02)
DIFFERENTIAL METHOD BLD: ABNORMAL
EOSINOPHIL # BLD: 0.1 K/UL (ref 0–0.4)
EOSINOPHIL NFR BLD: 2 % (ref 0–7)
ERYTHROCYTE [DISTWIDTH] IN BLOOD BY AUTOMATED COUNT: 11.8 % (ref 11.5–14.5)
GLUCOSE SERPL-MCNC: 88 MG/DL (ref 65–100)
HCT VFR BLD AUTO: 28.3 % (ref 35–47)
HGB BLD-MCNC: 9.2 G/DL (ref 11.5–16)
IMM GRANULOCYTES # BLD AUTO: 0 K/UL (ref 0–0.04)
IMM GRANULOCYTES NFR BLD AUTO: 1 % (ref 0–0.5)
LYMPHOCYTES # BLD: 1.3 K/UL (ref 0.8–3.5)
LYMPHOCYTES NFR BLD: 24 % (ref 12–49)
MCH RBC QN AUTO: 32.7 PG (ref 26–34)
MCHC RBC AUTO-ENTMCNC: 32.5 G/DL (ref 30–36.5)
MCV RBC AUTO: 100.7 FL (ref 80–99)
MONOCYTES # BLD: 0.8 K/UL (ref 0–1)
MONOCYTES NFR BLD: 14 % (ref 5–13)
NEUTS SEG # BLD: 3.3 K/UL (ref 1.8–8)
NEUTS SEG NFR BLD: 59 % (ref 32–75)
NRBC # BLD: 0 K/UL (ref 0–0.01)
NRBC BLD-RTO: 0 PER 100 WBC
PLATELET # BLD AUTO: 307 K/UL (ref 150–400)
PMV BLD AUTO: 8.8 FL (ref 8.9–12.9)
POTASSIUM SERPL-SCNC: 4 MMOL/L (ref 3.5–5.1)
RBC # BLD AUTO: 2.81 M/UL (ref 3.8–5.2)
SODIUM SERPL-SCNC: 141 MMOL/L (ref 136–145)
WBC # BLD AUTO: 5.6 K/UL (ref 3.6–11)

## 2021-12-27 PROCEDURE — 80048 BASIC METABOLIC PNL TOTAL CA: CPT

## 2021-12-27 PROCEDURE — 36415 COLL VENOUS BLD VENIPUNCTURE: CPT

## 2021-12-27 PROCEDURE — 85025 COMPLETE CBC W/AUTO DIFF WBC: CPT

## 2021-12-27 PROCEDURE — 97116 GAIT TRAINING THERAPY: CPT

## 2021-12-27 PROCEDURE — 97530 THERAPEUTIC ACTIVITIES: CPT

## 2021-12-27 PROCEDURE — 74011250637 HC RX REV CODE- 250/637: Performed by: ORTHOPAEDIC SURGERY

## 2021-12-27 RX ORDER — OXYCODONE HYDROCHLORIDE 5 MG/1
2.5 TABLET ORAL
Qty: 12 TABLET | Refills: 0 | Status: SHIPPED | OUTPATIENT
Start: 2021-12-27 | End: 2021-12-31

## 2021-12-27 RX ORDER — FERROUS SULFATE, DRIED 160(50) MG
1 TABLET, EXTENDED RELEASE ORAL
Qty: 30 TABLET | Refills: 0 | Status: SHIPPED | OUTPATIENT
Start: 2021-12-27

## 2021-12-27 RX ORDER — ACETAMINOPHEN 325 MG/1
650 TABLET ORAL EVERY 6 HOURS
Qty: 72 TABLET | Refills: 0 | Status: SHIPPED
Start: 2021-12-27 | End: 2022-01-05

## 2021-12-27 RX ORDER — ASPIRIN 325 MG
325 TABLET ORAL DAILY
Qty: 28 TABLET | Refills: 0 | Status: SHIPPED
Start: 2021-12-27 | End: 2022-02-03

## 2021-12-27 RX ORDER — AMOXICILLIN 250 MG
1 CAPSULE ORAL 2 TIMES DAILY
Qty: 10 TABLET | Refills: 0 | Status: SHIPPED
Start: 2021-12-27 | End: 2022-02-03

## 2021-12-27 RX ADMIN — ACETAMINOPHEN 650 MG: 325 TABLET ORAL at 00:04

## 2021-12-27 RX ADMIN — DOCUSATE SODIUM 50 MG AND SENNOSIDES 8.6 MG 1 TABLET: 8.6; 5 TABLET, FILM COATED ORAL at 08:56

## 2021-12-27 RX ADMIN — OYSTER SHELL CALCIUM WITH VITAMIN D 1 TABLET: 500; 200 TABLET, FILM COATED ORAL at 07:25

## 2021-12-27 RX ADMIN — OYSTER SHELL CALCIUM WITH VITAMIN D 1 TABLET: 500; 200 TABLET, FILM COATED ORAL at 12:11

## 2021-12-27 RX ADMIN — ACETAMINOPHEN 650 MG: 325 TABLET ORAL at 12:12

## 2021-12-27 RX ADMIN — POLYETHYLENE GLYCOL 3350 17 G: 17 POWDER, FOR SOLUTION ORAL at 08:57

## 2021-12-27 RX ADMIN — OXYCODONE HYDROCHLORIDE 2.5 MG: 5 TABLET ORAL at 00:04

## 2021-12-27 RX ADMIN — Medication 10 ML: at 07:26

## 2021-12-27 RX ADMIN — SERTRALINE 50 MG: 50 TABLET, FILM COATED ORAL at 08:57

## 2021-12-27 RX ADMIN — ASPIRIN 325 MG: 325 TABLET, COATED ORAL at 08:57

## 2021-12-27 RX ADMIN — ACETAMINOPHEN 650 MG: 325 TABLET ORAL at 07:25

## 2021-12-27 NOTE — PROGRESS NOTES
Problem: Mobility Impaired (Adult and Pediatric)  Goal: *Acute Goals and Plan of Care (Insert Text)  Description: FUNCTIONAL STATUS PRIOR TO ADMISSION: Patient was independent and active without use of DME.    HOME SUPPORT PRIOR TO ADMISSION: The patient lived alone with family close by  however pt did not require assist for ADL's. Physical Therapy Goals  Initiated 12/23/2021  1. Patient will move from supine to sit and sit to supine  and scoot up and down in bed with modified independence within 7 day(s). 2.  Patient will transfer from bed to chair and chair to bed with modified independence using the least restrictive device within 7 day(s). 3.  Patient will perform sit to stand with modified independence within 7 day(s). 4.  Patient will ambulate with modified independence for > 150 feet with the least restrictive device within 7 day(s). 5.  Patient will ascend/descend 4 stairs with one handrail(s) with modified independence within 7 day(s). Outcome: Progressing Towards Goal   PHYSICAL THERAPY TREATMENT  Patient: Ariana Campos (28 y.o. female)  Date: 12/27/2021  Diagnosis: Hip fracture ([facility] [address] [S72.009A] <principal problem not specified>  Procedure(s) (LRB):  LEFT HIP TFNA NAIL INSERTION  SYNTHES (Left) 5 Days Post-Op  Precautions: Fall,WBAT  Chart, physical therapy assessment, plan of care and goals were reviewed. ASSESSMENT  Patient continues with skilled PT services and is progressing towards goals. Pt with significant decrease in pain over past 48 hrs and demonstrating increased activity tolerance, now amb to bathroom and short distances in hallway with RW. Pt ready for discharge to inpatient rehab.      Current Level of Function Impacting Discharge (mobility/balance): Cga for transfers/amb with RW; decreased activity tolerance, pain control    Other factors to consider for discharge: Lives alone         PLAN :  Patient continues to benefit from skilled intervention to address the above impairments. Continue treatment per established plan of care. to address goals. Recommendation for discharge: (in order for the patient to meet his/her long term goals)  Therapy 3 hours per day 5-7 days per week    This discharge recommendation:  Has been made in collaboration with the attending provider and/or case management    IF patient discharges home will need the following DME: to be determined (TBD)       SUBJECTIVE:   Patient stated I finally got my pain under control.     OBJECTIVE DATA SUMMARY:   Critical Behavior:  Neurologic State: Alert  Orientation Level: Appropriate for age  Cognition: Appropriate for age attention/concentration     Functional Mobility Training:  Bed Mobility:     Supine to Sit: Contact guard assistance  Sit to Supine:  (left up in recliner chair)  Scooting: Stand-by assistance; Additional time        Transfers:  Sit to Stand: Contact guard assistance  Stand to Sit: Contact guard assistance                             Balance:  Sitting: Intact  Sitting - Static: Good (unsupported)  Sitting - Dynamic: Good (unsupported)  Standing: Impaired; With support  Standing - Static: Good;Constant support  Standing - Dynamic : Good;Constant support  Ambulation/Gait Training:  Distance (ft): 80 Feet (ft)  Assistive Device: Walker, rolling;Gait belt  Ambulation - Level of Assistance: Contact guard assistance; Adaptive equipment        Gait Abnormalities: Antalgic;Decreased step clearance     Left Side Weight Bearing: As tolerated  Base of Support: Shift to right  Stance: Left decreased  Speed/Autumn: Slow  Step Length: Right shortened  Swing Pattern: Left asymmetrical     Interventions: Safety awareness training; Tactile cues; Verbal cues     Pain Rating:  Pre- and Post- treatment  3/10    Activity Tolerance:   Improving - requires rest between activities - able to tolerate 3 hrs therapy in rehab program    After treatment patient left in no apparent distress:   Call bell within reach, Bed / chair alarm activated, and recliner with legs elevated and ice applied left hip    COMMUNICATION/COLLABORATION:   The patients plan of care was discussed with: Registered nurse.      Lupe Pierre, PT   Time Calculation: 30 mins

## 2021-12-27 NOTE — DISCHARGE INSTRUCTIONS
Discharge SNF/Rehab Instructions/LTAC       PATIENT ID: Iwona Simms  MRN: [id number]   YOB: 1955    DATE OF ADMISSION: 12/21/2021  8:41 AM    DATE OF DISCHARGE: 12/27/2021    PRIMARY CARE PROVIDER: Brittany Newton MD       ATTENDING PHYSICIAN: Gustavo Carter MD  DISCHARGING PROVIDER: Claudia Collier NP     To contact this individual call [phone] and ask the  to page. If unavailable ask to be transferred the Adult Hospitalist Department. CONSULTATIONS: None    PROCEDURES/SURGERIES: Procedure(s):  LEFT HIP TFNA NAIL INSERTION  SYNTHES    ADMITTING DIAGNOSES & HOSPITAL COURSE:   Lali Bateman is a 61 y. o. female who presents with hip pain  Patient reports that she was watching Netflix felt warm this morning, got up to go to the bathroom, and stepped on her shoelace, tripped and fell, started having some left knee pain and left hip pain, woke up this morning and had significant left hip pain to the point where she could not move, got concerned came to the ER, patient was found to have a femoral fracture and was requested to be admitted to the hospital service, patient denies any loss of consciousness    12/27: Seen and examined patient sitting in bedside chair. States that she is feeling good and ready to go to rehab today. Dressing on left hip is dry and intact. LLE is + for sensation, circulation and movement. No new complaints. Per patient's request spoke with her sister Phylicia Faustin ([phone]). Updated her on patient's condition and plan of care. Questions answered. DISCHARGE DIAGNOSES / PLAN:      Left intertrochanteric hip fracture   - s/p Left hip intramedullar nail (12/22). - Continue with pain management   - follow up with ortho Dr Moraima Dimas in 3-4 weeks   - Staples to be removed 2 weeks post op.    - Encourage IS usage  - Rx oxycode      Acute blood loss anemia   - Secondary to surgery   - Hgb 9.7 -> 9.2 stable  - Monitor labs and transfuse for hgb <7.0    Hypertension   - Stable   - Continue home regimen     Hypokalemia, resolved   - Potassium 4.0  - Monitor labs and replace as needed.      Tobacco abuse   - Smoking cessation counseling provided   - Refused nicotine patch          PENDING TEST RESULTS:   At the time of discharge the following test results are still pending: None     FOLLOW UP APPOINTMENTS:    Follow-up Information     Follow up With Specialties Details Why [address] [facility]   [address] [facility]    Darylene Sawyer, MD Family Medicine   [address]  [phone]      Serafin Gibbs DO Orthopedic Surgery Schedule an appointment as soon as possible for a visit for follow up in 3-4 weeks [facility] [address]  [phone]             [address] [place] [address] Nw:   Take medications as prescribed   See Ortho discharge instructions     DIET: Regular Diet      TUBE FEEDING INSTRUCTIONS: None     OXYGEN / BiPAP SETTINGS: None     ACTIVITY: Activity as tolerated and PT/OT Eval and Treat    WOUND CARE: See Ortho discharge instructions for surgical incision care    EQUIPMENT needed: Per PT/OT       DISCHARGE MEDICATIONS:   See Medication Reconciliation Form      NOTIFY YOUR PHYSICIAN FOR ANY OF THE FOLLOWING:   Fever over 101 degrees for 24 hours. Chest pain, shortness of breath, fever, chills, nausea, vomiting, diarrhea, change in mentation, falling, weakness, bleeding. Severe pain or pain not relieved by medications. Or, any other signs or symptoms that you may have questions about.     DISPOSITION:    Home With:   OT  PT  HH  RN      X SNF/Inpatient Rehab/LTAC    Independent/assisted living    Hospice    Other:       PATIENT CONDITION AT DISCHARGE:     Functional status    Poor    X Deconditioned     Independent      Cognition   X  Lucid     Forgetful     Dementia Catheters/lines (plus indication)    Webber     PICC     PEG    X None      Code status    X Full code     DNR      PHYSICAL EXAMINATION AT DISCHARGE:   Refer to Progress Note***      CHRONIC MEDICAL DIAGNOSES:  Problem List as of 12/27/2021 Date Reviewed: 1/29/2020          Codes Class Noted - Resolved    Hip fracture ([facility] ICD-10-CM: K20.045K  ICD-9-CM: 820.8  12/21/2021 - Present        History of craniotomy ICD-10-CM: Z98.890  ICD-9-CM: V45.89  1/25/2019 - Present        Adverse effect of amphetamines, sequela ICD-10-CM: T43.625S  ICD-9-CM: 909.5  11/21/2018 - Present        Recurrent depression ([facility] ICD-10-CM: F33.9  ICD-9-CM: 296.30  1/9/2018 - Present        Adult BMI <19 kg/sq m ICD-10-CM: Z68.1  ICD-9-CM: V85.0  5/2/2017 - Present        Tobacco dependence ICD-10-CM: F17.200  ICD-9-CM: 305.1  10/30/2015 - Present        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 ICD-10-CM: F06.34  ICD-9-CM: 293.83  3/31/2015 - Present        ADHD (attention deficit hyperactivity disorder) ICD-10-CM: F90.9  ICD-9-CM: 314.01  3/31/2015 - Present        Caffeine dependence ([facility] ICD-10-CM: F15.20  ICD-9-CM: 304.40  3/31/2015 - Present        Depressive disorder, not elsewhere classified ICD-10-CM: F32.9  ICD-9-CM: 055  8/27/2014 - Present        Generalized anxiety disorder ICD-10-CM: F41.1  ICD-9-CM: 300.02  8/27/2014 - Present        Memory disturbance ICD-10-CM: R41.3  ICD-9-CM: 780.93  8/7/2014 - Present        Inflammatory arthritis ICD-10-CM: M19.90  ICD-9-CM: 714.9  2/27/2014 - Present        AVM (arteriovenous malformation) brain ICD-10-CM: Q28.2  ICD-9-CM: 747.81  11/14/2011 - Present        Benign essential hypertension ICD-10-CM: I10  ICD-9-CM: 401.1  Unknown - Present        RESOLVED: Bilateral hand pain ICD-10-CM: M79.641, M79.642  ICD-9-CM: 729.5  2/27/2014 - 10/30/2015        RESOLVED: Left ankle pain ICD-10-CM: M25.572  ICD-9-CM: 719.47  2/27/2014 - 10/30/2015 Signed:   Rex Bejarano NP  12/27/2021  10:53 AM                   Post op Discharge Instructions Partial Hip Replacement     Patient Name: Trista East  Date of procedure: 12/22/2021   Procedure: Procedure(s):  LEFT HIP TFNA NAIL INSERTION  SYNTHES  Surgeon: Surgeon(s) and Role:     Marielena Coughlin, DO - Primary   PCP: Néstor Tony MD  Date of discharge: No discharge date for patient encounter. Follow up appointment with surgeon   See Surgeon(s) and Role:      Suzan Torres, DO - Primary approximately 3-4 weeks from date of surgery. Call [address] to make an appointment. When to call your Orthopaedic Surgeon. If you call after 5pm or on a weekend, the on call physician will be contacted   Pain that is not relieved by pain medication, ice, activity   Signs of infection  o Incision is reddened  o Incision continues to drain; drainage has an odor  o Persistent fever over 101 degrees   Signs of a blood clot in your leg  o Calf pain, tenderness, redness and/or swelling of lower leg    When to call your Primary Care Physician   Concerns about medical conditions such as diabetes, high blood pressure, asthma, congestive heart failure   Call if blood sugars are elevated, persistent headache or dizziness, coughing or congestion, constipation or diarrhea, burning with urination, abnormal heart rate (slow or fast)    When to call 911 and go to the nearest emergency room   Acute onset of chest pain, shortness of breath, difficulty breathing    Activity   Walk with your walker WBAT weight bearing as instructed by your physical therapist. Continue using your walker until seen for follow-up visit.      Practice your exercises 3 times a day as instructed by the physical therapist.  Arianne Grewal up frequently and walk (with assistance as needed)   You may not drive     Routine Hip Precautions - Maintain for 6 weeks post-surgery date - always get clarification from the physician before discontinuing   No straight leg raise   No internal rotation   No adduction past midline   No flexion beyond 90 degrees          Incision Care   Keep a dressing on your incision and change daily. Once your incision is not draining, you may leave it open to air.  Wash hands thoroughly before changing the dressing.  You may take a shower when your incision is dry. Do not take a tub bath or go swimming   You do have staples in your incision. If present they will be removed by the [facility], SNF or Rehab staff in 10-14 days. Preventing blood clots   Enteric coated Aspirin 325 mg one tablet twice daily for 28 days    Pain management   Take pain medication as prescribed; decrease the amount you take as your pain lessens   Avoid alcoholic beverages while taking pain medications   Place an ice bag on the hip for 15-20 minutes after exercising and as needed throughout the day and night    Diet   Resume usual diet; drink plenty of fluids; eat foods high in fiber, calcium and vitamin D.   You may want to take a stool softener (such as Senokot-S or Colace) to prevent constipations while you are taking pain medication.    If constipation occurs, take a laxative (such as Dulcolax tablets, Miralax, or a suppository)

## 2021-12-27 NOTE — PROGRESS NOTES
SREEKANTH: Plan for discharge to [facility] today No covid testing required for admission. Patient's son Jaja Nguyen will transport patient via car. Discharge folder located on hard chart to include ambulance form, discharge papers, Kardex and MAR. CM completed CM portion of Emtala. Rn to follow with Emtchapito and call report to #[phone]. Chart reviewed. CM called Sidra Robles with [facility] [phone], she is checking on bed availability and will call CM back shortly. CM received call from Parker with [facility] They can accept patient after 3 PM today. Patient prefers to be transported in the car with her son. CM called the son, Linda Rodrigues #[phone] and left message. Medicare pt has received, reviewed, and signed 2nd IM letter informing them of their right to appeal the discharge. Signed copy has been placed on pt bedside chart.       Renetta Workman, SALOMEW/CRM

## 2021-12-27 NOTE — ROUTINE PROCESS
TRANSFER - OUT REPORT:    Verbal report given to Greg(name) anirudh Harden  being transferred to [facility]) for routine progression of care       Report consisted of patients Situation, Background, Assessment and   Recommendations(SBAR). Information from the following report(s) SBAR, Kardex, Intake/Output and MAR was reviewed with the receiving nurse. Lines:   Peripheral IV 12/22/21 Anterior;Right Forearm (Active)   Site Assessment Clean, dry, & intact 12/27/21 0825   Phlebitis Assessment 0 12/27/21 0825   Infiltration Assessment 0 12/27/21 0825   Dressing Status Clean, dry, & intact 12/27/21 0825   Dressing Type Transparent 12/27/21 0825   Hub Color/Line Status Capped 12/27/21 0825   Alcohol Cap Used Yes 12/26/21 2200        Opportunity for questions and clarification was provided.       Patient transported with:

## 2021-12-27 NOTE — DISCHARGE SUMMARY
Discharge Summary       PATIENT ID: Dianna Canas  MRN: [id number]   YOB: 1955    DATE OF ADMISSION: 12/21/2021  8:41 AM    DATE OF DISCHARGE: 12/27/2021  PRIMARY CARE PROVIDER: Emiliano Escamilla MD     ATTENDING PHYSICIAN: Lety Balderas MD  DISCHARGING PROVIDER: Leticia Winn NP    To contact this individual call [phone] and ask the  to page. If unavailable ask to be transferred the Adult Hospitalist Department. CONSULTATIONS: None    PROCEDURES/SURGERIES: Procedure(s):  LEFT HIP TFNA NAIL INSERTION  SYNTHES    ADMITTING DIAGNOSES & HOSPITAL COURSE:   Baron Caron Bateman is a 61 y. o. female who presents with hip pain  Patient reports that she was watching Netflix felt warm this morning, got up to go to the bathroom, and stepped on her shoelace, tripped and fell, started having some left knee pain and left hip pain, woke up this morning and had significant left hip pain to the point where she could not move, got concerned came to the ER, patient was found to have a femoral fracture and was requested to be admitted to the hospital service, patient denies any loss of consciousness    12/27: Seen and examined patient sitting in bedside chair. States that she is feeling good and ready to go to rehab today. Dressing on left hip is dry and intact. LLE is + for sensation, circulation and movement. No new complaints. Per patient's request spoke with her sister Lety Nathan ([phone]). Updated her on patient's condition and plan of care. Questions answered. DISCHARGE DIAGNOSES / PLAN:      Left intertrochanteric hip fracture   - s/p Left hip intramedullar nail (12/22). - Continue with pain management   - follow up with ortho Dr Twila Andersen in 3-4 weeks   - Staples to be removed 2 weeks post op.    - Encourage IS usage  - Rx oxycode      Acute blood loss anemia   - Secondary to surgery   - Hgb 9.7 -> 9.2 stable  - Monitor labs and transfuse for hgb <7.0      Hypertension   - Stable - Continue home regimen     Hypokalemia, resolved   - Potassium 4.0  - Monitor labs and replace as needed.      Tobacco abuse   - Smoking cessation counseling provided   - Refused nicotine patch         ADDITIONAL CARE RECOMMENDATIONS:   Take medications as prescribed. See Ortho discharge instructions    PENDING TEST RESULTS:   At the time of discharge the following test results are still pending: None     FOLLOW UP APPOINTMENTS:    Follow-up Information     Follow up With Specialties Details Why [address] [facility]   [address] [facility]    Verenice Rajan MD Family Medicine   [address] [place] [address]  [phone]      Francesca Ramirez, DO Orthopedic Surgery Schedule an appointment as soon as possible for a visit for follow up in 3-4 weeks [place] [address]                DIET: Regular Diet      ACTIVITY: Activity as tolerated and PT/OT Eval and Treat    WOUND CARE: See Ortho discharge instructions for surgical incision care    EQUIPMENT needed: Per PT/OT      DISCHARGE MEDICATIONS:  Current Discharge Medication List      START taking these medications    Details   acetaminophen (TYLENOL) 325 mg tablet Take 2 Tablets by mouth every six (6) hours for 36 doses. Qty: 72 Tablet, Refills: 0  Start date: 12/27/2021, End date: 1/5/2022      calcium-vitamin D (OS-DELFINO +D3) 500 mg-200 unit per tablet Take 1 Tablet by mouth three (3) times daily (with meals). Qty: 30 Tablet, Refills: 0  Start date: 12/27/2021      oxyCODONE IR (ROXICODONE) 5 mg immediate release tablet Take 0.5 Tablets by mouth every four (4) hours as needed for Pain for up to 4 days.  Max Daily Amount: 15 mg.  Qty: 12 Tablet, Refills: 0  Start date: 12/27/2021, End date: 12/31/2021    Associated Diagnoses: Closed fracture of left hip, initial encounter ([facility]      senna-docusate (PERICOLACE) 8.6-50 mg per tablet Take 1 Tablet by mouth two (2) times a day. Qty: 10 Tablet, Refills: 0  Start date: 12/27/2021      aspirin (ASPIRIN) 325 mg tablet Take 1 Tablet by mouth daily. Qty: 28 Tablet, Refills: 0  Start date: 12/27/2021         CONTINUE these medications which have NOT CHANGED    Details   atorvastatin (Lipitor) 20 mg tablet Take 20 mg by mouth daily. sertraline (ZOLOFT) 50 mg tablet Take 1 Tab by mouth daily. Refills must come from psych  Qty: 30 Tab, Refills: 0    Associated Diagnoses: Depression with anxiety      lisinopril (PRINIVIL, ZESTRIL) 10 mg tablet TAKE 1 TABLET BY MOUTH ONCE DAILY  Qty: 90 Tab, Refills: 0    Associated Diagnoses: Benign essential hypertension      triamterene-hydroCHLOROthiazide (DYAZIDE) 37.5-25 mg per capsule TAKE ONE CAPSULE BY MOUTH DAILY FOR HYPERTENSION  Indications: high blood pressure  Qty: 90 Cap, Refills: 1    Associated Diagnoses: Benign essential hypertension      multivitamin (ONE A DAY) tablet Take 1 Tab by mouth daily. For women over 50         STOP taking these medications       aspirin delayed-release 81 mg tablet Comments:   Reason for Stopping:         alendronate (FOSAMAX) 70 mg tablet Comments:   Reason for Stopping:         hydrOXYchloroQUINE (PLAQUENIL) 200 mg tablet Comments:   Reason for Stopping:         predniSONE (DELTASONE) 5 mg tablet Comments:   Reason for Stopping:         naproxen (NAPROSYN) 250 mg tablet Comments:   Reason for Stopping:                 NOTIFY YOUR PHYSICIAN FOR ANY OF THE FOLLOWING:   Fever over 101 degrees for 24 hours. Chest pain, shortness of breath, fever, chills, nausea, vomiting, diarrhea, change in mentation, falling, weakness, bleeding. Severe pain or pain not relieved by medications. Or, any other signs or symptoms that you may have questions about.     DISPOSITION:    Home With:   OT  PT  HH  RN      X Long term SNF/Inpatient Rehab    Independent/assisted living    Hospice    Other: PATIENT CONDITION AT DISCHARGE:     Functional status    Poor    X Deconditioned     Independent      Cognition    X Lucid     Forgetful     Dementia      Catheters/lines (plus indication)    Webber     PICC     PEG    X None      Code status    X Full code     DNR      PHYSICAL EXAMINATION AT DISCHARGE:  General:          Alert, cooperative, no distress, appears stated age. HEENT:           Atraumatic, anicteric sclerae, pink conjunctivae                          No oral ulcers, mucosa moist, throat clear, dentition fair  Neck:               Supple, symmetrical  Lungs:             Clear to auscultation bilaterally. No Wheezing or Rhonchi. No rales. Chest wall:      No tenderness  No Accessory muscle use. Heart:              Regular  rhythm,  No  murmur   No edema  Abdomen:        Soft, non-tender. Not distended. Bowel sounds normal  Extremities:     No cyanosis. No clubbing,                            Skin turgor normal, Capillary refill normal  Skin:                Not pale. Not Jaundiced  No rashes. Left hip surgical dressing is dry and intact. LLE is positive for sensation, movement and circulation   Psych:             Not anxious or agitated.   Neurologic:      Alert, moves all extremities, answers questions appropriately and responds to commands       CHRONIC MEDICAL DIAGNOSES:  Problem List as of 12/27/2021 Date Reviewed: 1/29/2020          Codes Class Noted - Resolved    Hip fracture ([facility] ICD-10-CM: S72.009A  ICD-9-CM: 820.8  12/21/2021 - Present        History of craniotomy ICD-10-CM: Z98.890  ICD-9-CM: V45.89  1/25/2019 - Present        Adverse effect of amphetamines, sequela ICD-10-CM: T43.625S  ICD-9-CM: 909.5  11/21/2018 - Present        Recurrent depression ([facility] ICD-10-CM: F33.9  ICD-9-CM: 296.30  1/9/2018 - Present        Adult BMI <19 kg/sq m ICD-10-CM: Z68.1  ICD-9-CM: V85.0  5/2/2017 - Present        Tobacco dependence ICD-10-CM: F17.200  ICD-9-CM: 305.1  10/30/2015 - Present 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 ICD-10-CM: F06.34  ICD-9-CM: 293.83  3/31/2015 - Present        ADHD (attention deficit hyperactivity disorder) ICD-10-CM: F90.9  ICD-9-CM: 314.01  3/31/2015 - Present        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-CM: F15.20  ICD-9-CM: 304.40  3/31/2015 - Present        Depressive disorder, not elsewhere classified ICD-10-CM: F32.9  ICD-9-CM: 044  8/27/2014 - Present        Generalized anxiety disorder ICD-10-CM: F41.1  ICD-9-CM: 300.02  8/27/2014 - Present        Memory disturbance ICD-10-CM: R41.3  ICD-9-CM: 780.93  8/7/2014 - Present        Inflammatory arthritis ICD-10-CM: M19.90  ICD-9-CM: 714.9  2/27/2014 - Present        AVM (arteriovenous malformation) brain ICD-10-CM: Q28.2  ICD-9-CM: 747.81  11/14/2011 - Present        Benign essential hypertension ICD-10-CM: I10  ICD-9-CM: 401.1  Unknown - Present        RESOLVED: Bilateral hand pain ICD-10-CM: M79.641, M79.642  ICD-9-CM: 729.5  2/27/2014 - 10/30/2015        RESOLVED: Left ankle pain ICD-10-CM: M25.572  ICD-9-CM: 719.47  2/27/2014 - 10/30/2015              Greater than 45 minutes were spent with the patient on counseling and coordination of care    Signed:   Milagros Layton NP  12/27/2021  10:53 AM

## 2022-01-08 ENCOUNTER — APPOINTMENT (OUTPATIENT)
Dept: GENERAL RADIOLOGY | Age: 67
End: 2022-01-08
Attending: PHYSICIAN ASSISTANT
Payer: MEDICARE

## 2022-01-08 ENCOUNTER — HOSPITAL ENCOUNTER (EMERGENCY)
Age: 67
Discharge: HOME | End: 2022-01-08
Attending: EMERGENCY MEDICINE
Payer: MEDICARE

## 2022-01-08 VITALS
TEMPERATURE: 98.5 F | HEART RATE: 98 BPM | RESPIRATION RATE: 18 BRPM | SYSTOLIC BLOOD PRESSURE: 124 MMHG | DIASTOLIC BLOOD PRESSURE: 70 MMHG | OXYGEN SATURATION: 100 %

## 2022-01-08 DIAGNOSIS — Z48.02 ENCOUNTER FOR STAPLE REMOVAL: Primary | ICD-10-CM

## 2022-01-08 DIAGNOSIS — G89.18 POST-OPERATIVE PAIN: ICD-10-CM

## 2022-01-08 PROCEDURE — 73502 X-RAY EXAM HIP UNI 2-3 VIEWS: CPT

## 2022-01-08 PROCEDURE — 75810000275 HC EMERGENCY DEPT VISIT NO LEVEL OF CARE

## 2022-01-08 PROCEDURE — 74011250637 HC RX REV CODE- 250/637: Performed by: EMERGENCY MEDICINE

## 2022-01-08 RX ORDER — CEPHALEXIN 500 MG/1
500 CAPSULE ORAL 4 TIMES DAILY
Qty: 28 CAPSULE | Refills: 0 | Status: SHIPPED | OUTPATIENT
Start: 2022-01-08 | End: 2022-01-15

## 2022-01-08 RX ORDER — HYDROCODONE BITARTRATE AND ACETAMINOPHEN 5; 325 MG/1; MG/1
1 TABLET ORAL
Qty: 10 TABLET | Refills: 0 | Status: SHIPPED | OUTPATIENT
Start: 2022-01-08 | End: 2022-01-11

## 2022-01-08 RX ORDER — OXYCODONE AND ACETAMINOPHEN 5; 325 MG/1; MG/1
1 TABLET ORAL ONCE
Status: COMPLETED | OUTPATIENT
Start: 2022-01-08 | End: 2022-01-08

## 2022-01-08 RX ADMIN — OXYCODONE AND ACETAMINOPHEN 1 TABLET: 5; 325 TABLET ORAL at 13:33

## 2022-01-08 NOTE — ED PROVIDER NOTES
EMERGENCY DEPARTMENT HISTORY AND PHYSICAL EXAM      Date: 1/8/2022  Patient Name: Darrell Brito    History of Presenting Illness     Chief Complaint   Patient presents with    Leg Pain     pt had left hip surgery on 12/20. Pt want he staples removed and is concerned they might get infected        History Provided By: Patient    HPI: Darrell Brito, 61 y.o. female with PMHx significant for CVA, hypertension, presents to the ED with cc of wound check. The patient sustained a hip fracture 2-1/2 weeks ago and had surgery done at that time. She was supposed to follow-up with her orthopedic surgeon 2 weeks postop for a recheck and staple removal but reports her daughter has called the orthopedic office and has not received a call back to schedule this appointment. Over the last 3 days, her home health nurse has noted some redness around the staples and she was concerned they were getting infected. The patient reports she has continued to have pain in the hip joint but is doing well with physical therapy and has been ambulatory with a walker. She denies fevers, drainage from wounds. There are no other complaints, changes, or physical findings at this time. PCP: Bryant Rai MD    No current facility-administered medications on file prior to encounter. Current Outpatient Medications on File Prior to Encounter   Medication Sig Dispense Refill    calcium-vitamin D (OS-DELFINO +D3) 500 mg-200 unit per tablet Take 1 Tablet by mouth three (3) times daily (with meals). 30 Tablet 0    senna-docusate (PERICOLACE) 8.6-50 mg per tablet Take 1 Tablet by mouth two (2) times a day. 10 Tablet 0    aspirin (ASPIRIN) 325 mg tablet Take 1 Tablet by mouth daily. 28 Tablet 0    atorvastatin (Lipitor) 20 mg tablet Take 20 mg by mouth daily.  sertraline (ZOLOFT) 50 mg tablet Take 1 Tab by mouth daily.  Refills must come from psych 30 Tab 0    lisinopril (PRINIVIL, ZESTRIL) 10 mg tablet TAKE 1 TABLET BY MOUTH ONCE DAILY Reservation form reviewed/signed.   ama faxed to EP lab   Order previously placed for auth   No medication holds or PAT   90 Tab 0    ergocalciferol (VITAMIN D2) 50,000 unit capsule Take 50,000 Units by mouth.  triamterene-hydroCHLOROthiazide (DYAZIDE) 37.5-25 mg per capsule TAKE ONE CAPSULE BY MOUTH DAILY FOR HYPERTENSION  Indications: high blood pressure 90 Cap 1    multivitamin (ONE A DAY) tablet Take 1 Tab by mouth daily. For women over 48         Past History     Past Medical History:  Past Medical History:   Diagnosis Date    ADD (attention deficit disorder)     Arthritis     AVM (arteriovenous malformation) brain 2009    CVA (cerebral infarction) 2009    Hypertension     Inattention        Past Surgical History:  Past Surgical History:   Procedure Laterality Date    HX HEENT      HX HYSTERECTOMY      HX ORTHOPAEDIC      NEUROLOGICAL PROCEDURE UNLISTED      anurism surgery, AVM malformatiom       Family History:  Family History   Problem Relation Age of Onset    Hypertension Mother     Elevated Lipids Mother     Diabetes Father     Heart Attack Maternal Grandmother     Diabetes Paternal Grandmother     Diabetes Paternal Grandfather        Social History:  Social History     Tobacco Use    Smoking status: Current Every Day Smoker     Packs/day: 0.50     Years: 16.00     Pack years: 8.00    Smokeless tobacco: Never Used   Vaping Use    Vaping Use: Never used   Substance Use Topics    Alcohol use: Yes     Alcohol/week: 2.0 standard drinks     Types: 1 Glasses of wine, 1 Cans of beer per week    Drug use: No       Allergies:  No Known Allergies      Review of Systems   Review of Systems   Constitutional: Negative for chills and fever. HENT: Negative for ear pain and sore throat. Eyes: Negative for redness and visual disturbance. Respiratory: Negative for cough and shortness of breath. Cardiovascular: Negative for chest pain and palpitations. Gastrointestinal: Negative for abdominal pain, nausea and vomiting. Genitourinary: Negative for dysuria and hematuria.    Musculoskeletal: Negative for back pain and gait problem. +left hip pain   Skin: Negative for rash and wound. +redness   Neurological: Negative for dizziness and headaches. Psychiatric/Behavioral: Negative for behavioral problems and confusion. All other systems reviewed and are negative. Physical Exam   Physical Exam  Constitutional:       Appearance: She is not toxic-appearing. HENT:      Head: Normocephalic and atraumatic. Mouth/Throat:      Mouth: Mucous membranes are moist.   Eyes:      Extraocular Movements: Extraocular movements intact. Pupils: Pupils are equal, round, and reactive to light. Cardiovascular:      Rate and Rhythm: Normal rate and regular rhythm. Pulmonary:      Effort: Pulmonary effort is normal. No respiratory distress. Musculoskeletal:         General: No deformity. Normal range of motion. Cervical back: Normal range of motion and neck supple. Comments: There are 3 surgical incision sites to the left lateral proximal leg. There are 6 staples in place to the distal site, 4 staples in place to the middle surgical site, and 5 staples in place to the proximal surgical site. There is mild erythema surrounding the staple insertion sites, but no erythema lining the surgical incision itself. No palpable fluctuance, purulent drainage, or dehiscense. Patient has some pain in the left hip with internal rotation, but does have full range of motion in the joint. 2+ DP pulse. Distal sensation intact. Skin:     General: Skin is warm and dry. Neurological:      General: No focal deficit present. Mental Status: She is alert and oriented to person, place, and time. Psychiatric:         Behavior: Behavior normal.           Diagnostic Study Results     Labs -   No results found for this or any previous visit (from the past 12 hour(s)). Radiologic Studies -   XR HIP LT W OR WO PELV 2-3 VWS   Final Result   Status post recent ORIF of the left femur.  Fracture lucency remains in the   intertrochanteric region. Alignment is anatomic. Edis Ortega CT Results  (Last 48 hours)    None        CXR Results  (Last 48 hours)    None            Medical Decision Making   I am the first provider for this patient. I reviewed the vital signs, available nursing notes, past medical history, past surgical history, family history and social history. Vital Signs-Reviewed the patient's vital signs. Patient Vitals for the past 12 hrs:   Temp Pulse Resp BP SpO2   01/08/22 1614  98  124/70    01/08/22 1241 98.5 °F (36.9 °C) 99 18 125/75 100 %         Records Reviewed: Nursing Notes and Old Medical Records      Provider Notes (Medical Decision Making):   DDx: contact dermatitis, cellulitis    Patient presents for staple removal and wound check 2.5 weeks post-op from left femur ORIF. She is afebrile and clinically well appearing. Staples were removed without dehiscence or drainage. There is some mild erythema to the staple insertion sites. I suspect contact dermatitis but will cover with keflex for possible cellulitis. She has full range of motion in the hip and is able to ambulate with walker. I have low suspicion for surgical infection. Discussed case with Abhi Boone, orthopedic surgery, who agrees with this. He was going to evaluate the patient but the patient declined and said she would rather follow up with her orthopedic surgeon in the office on Monday. ED Course:   Initial assessment performed. The patients presenting problems have been discussed, and they are in agreement with the care plan formulated and outlined with them. I have encouraged them to ask questions as they arise throughout their visit. Disposition:  4:08 PM  The patient has been re-evaluated and is ready for discharge. Reviewed available results with patient. Counseled patient on diagnosis and care plan. Patient has expressed understanding, and all questions have been answered.  Patient agrees with plan and agrees to follow up as recommended, or to return to the ED if their symptoms worsen. Discharge instructions have been provided and explained to the patient, along with reasons to return to the ED. PLAN:  1. Discharge Medication List as of 1/8/2022  4:08 PM      START taking these medications    Details   cephALEXin (Keflex) 500 mg capsule Take 1 Capsule by mouth four (4) times daily for 7 days. , Normal, Disp-28 Capsule, R-0      HYDROcodone-acetaminophen (Norco) 5-325 mg per tablet Take 1 Tablet by mouth every six (6) hours as needed for Pain for up to 3 days. Max Daily Amount: 4 Tablets., Normal, Disp-10 Tablet, R-0         CONTINUE these medications which have NOT CHANGED    Details   calcium-vitamin D (OS-DELFINO +D3) 500 mg-200 unit per tablet Take 1 Tablet by mouth three (3) times daily (with meals). , Normal, Disp-30 Tablet, R-0      senna-docusate (PERICOLACE) 8.6-50 mg per tablet Take 1 Tablet by mouth two (2) times a day., No Print, Disp-10 Tablet, R-0      aspirin (ASPIRIN) 325 mg tablet Take 1 Tablet by mouth daily. , No Print, Disp-28 Tablet, R-0      atorvastatin (Lipitor) 20 mg tablet Take 20 mg by mouth daily. , Historical Med      sertraline (ZOLOFT) 50 mg tablet Take 1 Tab by mouth daily. Refills must come from psych, Normal, Disp-30 Tab, R-0      lisinopril (PRINIVIL, ZESTRIL) 10 mg tablet TAKE 1 TABLET BY MOUTH ONCE DAILY, Normal, Disp-90 Tab, R-0      ergocalciferol (VITAMIN D2) 50,000 unit capsule Take 50,000 Units by mouth., Historical Med      triamterene-hydroCHLOROthiazide (DYAZIDE) 37.5-25 mg per capsule TAKE ONE CAPSULE BY MOUTH DAILY FOR HYPERTENSION  Indications: high blood pressure, Normal, Disp-90 Cap, R-1      multivitamin (ONE A DAY) tablet Take 1 Tab by mouth daily. For women over 48, Historical Med           2.    Follow-up Information     Follow up With Specialties Details Why Contact Nery Junior, DO Orthopedic Surgery Schedule an appointment as soon as possible for a visit   [phone] [address] [facility] [address]  [address] [address]  [phone]      [facility] EMERGENCY DEPT Emergency Medicine Go to  if you develop fevers to 100.4 or above, are unable to bear weight or move hip, intolerable pain [address]  [address]  [facility]  [phone]        Return to ED if worse     Diagnosis     Clinical Impression:   1. Encounter for staple removal    2. Post-operative pain            [place].  VIRY Thakur

## 2022-01-08 NOTE — ED NOTES
Ambulatory out of ER with walker and belongings, no acute distress noted. DC papers reviewed and in hand.

## 2022-01-13 DIAGNOSIS — S72.002A CLOSED FRACTURE OF LEFT HIP, INITIAL ENCOUNTER (HCC): Primary | ICD-10-CM

## 2022-01-13 RX ORDER — OXYCODONE AND ACETAMINOPHEN 5; 325 MG/1; MG/1
1 TABLET ORAL
Qty: 28 TABLET | Refills: 0 | Status: SHIPPED | OUTPATIENT
Start: 2022-01-13 | End: 2022-01-20

## 2022-02-03 ENCOUNTER — OFFICE VISIT (OUTPATIENT)
Dept: ORTHOPEDIC SURGERY | Age: 67
End: 2022-02-03
Payer: MEDICARE

## 2022-02-03 VITALS — HEIGHT: 66 IN | WEIGHT: 109 LBS | BODY MASS INDEX: 17.52 KG/M2

## 2022-02-03 DIAGNOSIS — Z09 S/P ORTHOPEDIC SURGERY, FOLLOW-UP EXAM: Primary | ICD-10-CM

## 2022-02-03 PROCEDURE — 99024 POSTOP FOLLOW-UP VISIT: CPT | Performed by: ORTHOPAEDIC SURGERY

## 2022-02-03 RX ORDER — DEXTROAMPHETAMINE SACCHARATE, AMPHETAMINE ASPARTATE, DEXTROAMPHETAMINE SULFATE AND AMPHETAMINE SULFATE 5; 5; 5; 5 MG/1; MG/1; MG/1; MG/1
20 TABLET ORAL DAILY
COMMUNITY
Start: 2022-01-07

## 2022-02-03 RX ORDER — CYCLOBENZAPRINE HCL 10 MG
10 TABLET ORAL DAILY PRN
COMMUNITY
Start: 2022-01-26 | End: 2022-03-27

## 2022-02-03 NOTE — LETTER
2/3/2022    Patient: Chris Chairez   YOB: 1955   Date of Visit: 2/3/2022     Estefany Clark, [address] [facility] [address]  Via Fax: [phone]    Dear Estefany Clark MD,      Thank you for referring Ms. Osmany Andrew to [facility] for evaluation. My notes for this consultation are attached. If you have questions, please do not hesitate to call me. I look forward to following your patient along with you.       Sincerely,    Marsha Adames, DO

## 2022-02-03 NOTE — PROGRESS NOTES
Alena Trejo (: 1955) is a 61 y.o. female, established patient, here for evaluation of the following chief complaint(s):  Hip Pain (left hip)       ASSESSMENT/PLAN:  Below is the assessment and plan developed based on review of pertinent history, physical exam, labs, studies, and medications. Overall she seems to be doing great. She may continue progressing her activities as she can tolerate and I'll plan to see her back on an as-needed basis at this time. 1. S/P orthopedic surgery, follow-up exam  -     XR FEMUR LT 2 V; Future      Return if symptoms worsen or fail to improve. SUBJECTIVE/OBJECTIVE:  Alena Trejo (: 1955) is a 61 y.o. female. She notes that she has been doing very well getting her motion and function back. She continues to progress her activities. No Known Allergies    Current Outpatient Medications   Medication Sig    cyclobenzaprine (FLEXERIL) 10 mg tablet Take 10 mg by mouth daily as needed.  dextroamphetamine-amphetamine (ADDERALL) 20 mg tablet Take 20 mg by mouth daily.  calcium-vitamin D (OS-DELFINO +D3) 500 mg-200 unit per tablet Take 1 Tablet by mouth three (3) times daily (with meals).  atorvastatin (Lipitor) 20 mg tablet Take 20 mg by mouth daily.  sertraline (ZOLOFT) 50 mg tablet Take 1 Tab by mouth daily. Refills must come from psych    lisinopril (PRINIVIL, ZESTRIL) 10 mg tablet TAKE 1 TABLET BY MOUTH ONCE DAILY    triamterene-hydroCHLOROthiazide (DYAZIDE) 37.5-25 mg per capsule TAKE ONE CAPSULE BY MOUTH DAILY FOR HYPERTENSION  Indications: high blood pressure    multivitamin (ONE A DAY) tablet Take 1 Tab by mouth daily. For women over 50     No current facility-administered medications for this visit.        Social History     Socioeconomic History    Marital status:      Spouse name: Not on file    Number of children: Not on file    Years of education: Not on file    Highest education level: Not on file   Occupational History    Not on file   Tobacco Use    Smoking status: Current Every Day Smoker     Packs/day: 0.50     Years: 16.00     Pack years: 8.00    Smokeless tobacco: Never Used   Vaping Use    Vaping Use: Never used   Substance and Sexual Activity    Alcohol use: Yes     Alcohol/week: 2.0 standard drinks     Types: 1 Glasses of wine, 1 Cans of beer per week    Drug use: No    Sexual activity: Not Currently   Other Topics Concern     Service Not Asked    Blood Transfusions Not Asked    Caffeine Concern Not Asked    Occupational Exposure Not Asked    Hobby Hazards Not Asked    Sleep Concern Not Asked    Stress Concern Not Asked    Weight Concern Not Asked    Special Diet Not Asked    Back Care Not Asked    Exercise Not Asked    Bike Helmet Not Asked   [address] Not Asked    Self-Exams Not Asked   Social History Narrative    , her ex  has passed away, one son in [place], two daughters- one in [place] and one in [place]. Social Determinants of Health     Financial Resource Strain:     Difficulty of Paying Living Expenses: Not on file   Food Insecurity:     Worried About Running Out of Food in the Last Year: Not on file    Leidy of Food in the Last Year: Not on file   Transportation Needs:     Lack of Transportation (Medical): Not on file    Lack of Transportation (Non-Medical):  Not on file   Physical Activity:     Days of Exercise per Week: Not on file    Minutes of Exercise per Session: Not on file   Stress:     Feeling of Stress : Not on file   Social Connections:     Frequency of Communication with Friends and Family: Not on file    Frequency of Social Gatherings with Friends and Family: Not on file    Attends Hindu Services: Not on file    Active Member of Clubs or Organizations: Not on file    Attends Club or Organization Meetings: Not on file    Marital Status: Not on file   Intimate Partner Violence:     Fear of Current or Ex-Partner: Not on file    Emotionally Abused: Not on file    Physically Abused: Not on file    Sexually Abused: Not on file   Housing Stability:     Unable to Pay for Housing in the Last Year: Not on file    Number of Places Lived in the Last Year: Not on file    Unstable Housing in the Last Year: Not on file       Past Surgical History:   Procedure Laterality Date    HX HEENT      HX HIP REPLACEMENT      HX HYSTERECTOMY      HX ORTHOPAEDIC      HX WRIST FRACTURE TX      NEUROLOGICAL PROCEDURE UNLISTED      anurism surgery, AVM malformatiom       Family History   Problem Relation Age of Onset    Hypertension Mother     Elevated Lipids Mother     Diabetes Father     Heart Attack Maternal Grandmother     Diabetes Paternal Grandmother     Diabetes Paternal Grandfather         OB History    No obstetric history on file. REVIEW OF SYSTEMS:    Patient denies any recent fever, chills, nausea, vomiting, chest pain, or shortness of breath. Vitals:  Ht 5' 6\" (1.676 m)   Wt 109 lb (49.4 kg)   BMI 17.59 kg/m²    Body mass index is 17.59 kg/m². PHYSICAL EXAM:  General exam: Patient is awake, alert, and oriented x3. Well-appearing. No acute distress. Ambulates with a slightly antalgic gait    Left hip: Neurovascular and sensory intact. Mild tenderness palpation is noted at the trochanteric bursa. Normal range of motion of the hip active and passive    IMAGING:    XR Results (most recent):  Results from Appointment encounter on 02/03/22    XR FEMUR LT 2 V    Narrative  X-rays of the left femur 4 views done today show a well aligned intertrochanteric fracture with no signs of hardware failure or loosening. Results from Hospital Encounter encounter on 01/08/22    XR HIP LT W OR WO PELV 2-3 VWS    Narrative  EXAM: XR HIP LT W OR WO PELV 2-3 VWS    INDICATION: 2.5 weeks post op, continued hip pain. COMPARISON: 12/22/2021. FINDINGS: AP view of the pelvis and a frogleg lateral view of the left hip. The  bones are osteopenic. The patient is status post ORIF of the proximal left  femur. There is continued fracture lucency in the intertrochanteric left femur,  best appreciated on frog-leg lateral view. Alignment is anatomic. No new  fracture. No dislocation. Impression  Status post recent ORIF of the left femur. Fracture lucency remains in the  intertrochanteric region. Alignment is anatomic. Rian Gutter Results from [place] encounter on 12/21/21    [facility] XR TECHNOLOGIST SERVICE    Narrative  Fluoroscopy was utilized. Impression  FLUOROSCOPY WAS USED. Fluoro Dose:  2.359 mGy. vk         Orders Placed This Encounter    XR FEMUR LT 2 V     Please perform 2 views of the left femur to include an AP and lateral     Standing Status:   Future     Number of Occurrences:   1     Standing Expiration Date:   3/3/2022     Order Specific Question:   Reason for Exam     Answer:   LEFT LEG PAIN              An electronic signature was used to authenticate this note.   -- Nuvia Viramontes, DO

## 2022-03-19 PROBLEM — T43.625S: Status: ACTIVE | Noted: 2018-11-21

## 2022-03-19 PROBLEM — Z98.890 HISTORY OF CRANIOTOMY: Status: ACTIVE | Noted: 2019-01-25

## 2022-03-20 PROBLEM — S72.009A HIP FRACTURE (HCC): Status: ACTIVE | Noted: 2021-12-21

## 2022-03-20 PROBLEM — F33.9 RECURRENT DEPRESSION (HCC): Status: ACTIVE | Noted: 2018-01-09

## 2022-10-04 ENCOUNTER — HOSPITAL ENCOUNTER (EMERGENCY)
Age: 67
Discharge: HOME | End: 2022-10-04
Attending: EMERGENCY MEDICINE
Payer: MEDICARE

## 2022-10-04 VITALS
TEMPERATURE: 98.7 F | HEART RATE: 77 BPM | WEIGHT: 106.92 LBS | HEIGHT: 66 IN | SYSTOLIC BLOOD PRESSURE: 132 MMHG | OXYGEN SATURATION: 96 % | BODY MASS INDEX: 17.18 KG/M2 | DIASTOLIC BLOOD PRESSURE: 92 MMHG | RESPIRATION RATE: 17 BRPM

## 2022-10-04 DIAGNOSIS — R00.2 PALPITATIONS: Primary | ICD-10-CM

## 2022-10-04 LAB
ALBUMIN SERPL-MCNC: 4.3 G/DL (ref 3.5–5)
ALBUMIN/GLOB SERPL: 1.1 {RATIO} (ref 1.1–2.2)
ALP SERPL-CCNC: 70 U/L (ref 45–117)
ALT SERPL-CCNC: 24 U/L (ref 12–78)
ANION GAP SERPL CALC-SCNC: 8 MMOL/L (ref 5–15)
AST SERPL-CCNC: 19 U/L (ref 15–37)
BASOPHILS # BLD: 0 K/UL (ref 0–0.1)
BASOPHILS NFR BLD: 1 % (ref 0–1)
BILIRUB SERPL-MCNC: 0.8 MG/DL (ref 0.2–1)
BUN SERPL-MCNC: 15 MG/DL (ref 6–20)
BUN/CREAT SERPL: 21 (ref 12–20)
CALCIUM SERPL-MCNC: 9.4 MG/DL (ref 8.5–10.1)
CHLORIDE SERPL-SCNC: 96 MMOL/L (ref 97–108)
CO2 SERPL-SCNC: 29 MMOL/L (ref 21–32)
CREAT SERPL-MCNC: 0.73 MG/DL (ref 0.55–1.02)
DIFFERENTIAL METHOD BLD: ABNORMAL
EOSINOPHIL # BLD: 0.1 K/UL (ref 0–0.4)
EOSINOPHIL NFR BLD: 1 % (ref 0–7)
ERYTHROCYTE [DISTWIDTH] IN BLOOD BY AUTOMATED COUNT: 11.9 % (ref 11.5–14.5)
GLOBULIN SER CALC-MCNC: 3.8 G/DL (ref 2–4)
GLUCOSE SERPL-MCNC: 105 MG/DL (ref 65–100)
HCT VFR BLD AUTO: 45.4 % (ref 35–47)
HGB BLD-MCNC: 15.1 G/DL (ref 11.5–16)
IMM GRANULOCYTES # BLD AUTO: 0 K/UL (ref 0–0.04)
IMM GRANULOCYTES NFR BLD AUTO: 1 % (ref 0–0.5)
LYMPHOCYTES # BLD: 2 K/UL (ref 0.8–3.5)
LYMPHOCYTES NFR BLD: 32 % (ref 12–49)
MAGNESIUM SERPL-MCNC: 1.9 MG/DL (ref 1.6–2.4)
MCH RBC QN AUTO: 33 PG (ref 26–34)
MCHC RBC AUTO-ENTMCNC: 33.3 G/DL (ref 30–36.5)
MCV RBC AUTO: 99.3 FL (ref 80–99)
MONOCYTES # BLD: 0.7 K/UL (ref 0–1)
MONOCYTES NFR BLD: 11 % (ref 5–13)
NEUTS SEG # BLD: 3.6 K/UL (ref 1.8–8)
NEUTS SEG NFR BLD: 54 % (ref 32–75)
NRBC # BLD: 0 K/UL (ref 0–0.01)
NRBC BLD-RTO: 0 PER 100 WBC
PLATELET # BLD AUTO: 320 K/UL (ref 150–400)
PMV BLD AUTO: 9 FL (ref 8.9–12.9)
POTASSIUM SERPL-SCNC: 3.8 MMOL/L (ref 3.5–5.1)
PROT SERPL-MCNC: 8.1 G/DL (ref 6.4–8.2)
RBC # BLD AUTO: 4.57 M/UL (ref 3.8–5.2)
SODIUM SERPL-SCNC: 133 MMOL/L (ref 136–145)
TSH SERPL DL<=0.05 MIU/L-ACNC: 0.98 UIU/ML (ref 0.36–3.74)
WBC # BLD AUTO: 6.4 K/UL (ref 3.6–11)

## 2022-10-04 PROCEDURE — 99284 EMERGENCY DEPT VISIT MOD MDM: CPT

## 2022-10-04 PROCEDURE — 84443 ASSAY THYROID STIM HORMONE: CPT

## 2022-10-04 PROCEDURE — 93005 ELECTROCARDIOGRAM TRACING: CPT

## 2022-10-04 PROCEDURE — 83735 ASSAY OF MAGNESIUM: CPT

## 2022-10-04 PROCEDURE — 36415 COLL VENOUS BLD VENIPUNCTURE: CPT

## 2022-10-04 PROCEDURE — 85025 COMPLETE CBC W/AUTO DIFF WBC: CPT

## 2022-10-04 PROCEDURE — 80053 COMPREHEN METABOLIC PANEL: CPT

## 2022-10-04 RX ORDER — ASPIRIN 81 MG/1
81 TABLET ORAL DAILY
COMMUNITY

## 2022-10-04 NOTE — ED TRIAGE NOTES
Pt relates that she has experienced palpitations for several months, but she felt that her heart was racing for too long and wanted to be seen by a doctor today. Pt denies chest pain, shortness of breath or nausea/vomiting.

## 2022-10-04 NOTE — ED PROVIDER NOTES
HPI     63-year-old female here with palpitations. This has been an ongoing problem for several weeks to months. She has seen her doctor for this and worn a cardiac monitor at home, but nothing was found as far she knows. 20 like her palpitations are worse today so she was advised to come to the emergency department by her doctor. No shortness of breath. No chest pain. No fever. No nausea or vomiting. Nothing makes her symptoms better or worse. Past Medical History:   Diagnosis Date    ADD (attention deficit disorder)     Arthritis     AVM (arteriovenous malformation) brain 2009    CVA (cerebral infarction) 2009    Hypertension     Inattention        Past Surgical History:   Procedure Laterality Date    HX HEENT      HX HIP REPLACEMENT      HX HYSTERECTOMY      HX ORTHOPAEDIC      HX TONSILLECTOMY      HX WRIST FRACTURE TX      NEUROLOGICAL PROCEDURE UNLISTED      anurism surgery, AVM malformatiom         Family History:   Problem Relation Age of Onset    Hypertension Mother     Elevated Lipids Mother     Diabetes Father     Heart Attack Maternal Grandmother     Diabetes Paternal Grandmother     Diabetes Paternal Grandfather        Social History     Socioeconomic History    Marital status:      Spouse name: Not on file    Number of children: Not on file    Years of education: Not on file    Highest education level: Not on file   Occupational History    Not on file   Tobacco Use    Smoking status: Every Day     Packs/day: 0.50     Years: 16.00     Pack years: 8.00     Types: Cigarettes    Smokeless tobacco: Never   Vaping Use    Vaping Use: Never used   Substance and Sexual Activity    Alcohol use:  Yes     Alcohol/week: 2.0 standard drinks     Types: 1 Glasses of wine, 1 Cans of beer per week     Comment: a few times per week    Drug use: No    Sexual activity: Not Currently   Other Topics Concern     Service Not Asked    Blood Transfusions Not Asked    Caffeine Concern Not Asked Occupational Exposure Not Asked    [address] Hazards Not Asked    Sleep Concern Not Asked    Stress Concern Not Asked    Weight Concern Not Asked    Special Diet Not Asked    Back Care Not Asked    Exercise Not Asked    Bike Helmet Not Asked    Seat Belt Not Asked    Self-Exams Not Asked   Social History Narrative    , her ex  has passed away, one son in [place], two daughters- one in [place] and one in [place]. Social Determinants of Health     Financial Resource Strain: Not on file   Food Insecurity: Not on file   Transportation Needs: Not on file   Physical Activity: Not on file   Stress: Not on file   Social Connections: Not on file   Intimate Partner Violence: Not on file   Housing Stability: Not on file         ALLERGIES: Patient has no known allergies. Review of Systems  Review of Systems   Constitutional: (-) weight loss. HEENT: (-) stiff neck   Eyes: (-) discharge. Respiratory: (-) cough. Cardiovascular: (-) syncope. Gastrointestinal: (-) blood in stool. Genitourinary: (-) hematuria. Musculoskeletal: (-) myalgias. Neurological: (-) seizure. Skin: (-) petechiae  Lymph/Immunologic: (-) enlarged lymph nodes  All other systems reviewed and are negative. Vitals:    10/04/22 1155   BP: (!) 148/82   Pulse: 91   Resp: 20   Temp: 98.7 °F (37.1 °C)   SpO2: 97%   Weight: 48.5 kg (106 lb 14.8 oz)   Height: 5' 6\" (1.676 m)            Physical Exam Nursing note and vitals reviewed. Constitutional: oriented to person, place, and time. appears well-developed and well-nourished. No distress. Head: Normocephalic and atraumatic. Sclera anicteric  Nose: No rhinorrhea  Mouth/Throat: Oropharynx is clear and moist. Pharynx normal  Eyes: Conjunctivae are normal. Pupils are equal, round, and reactive to light. Right eye exhibits no discharge. Left eye exhibits no discharge. Neck: Painless normal range of motion. Neck supple. No LAD.   Cardiovascular: Normal rate, regular rhythm, normal heart sounds and intact distal pulses. Exam reveals no gallop and no friction rub. No murmur heard. Pulmonary/Chest:  No respiratory distress. No wheezes. No rales. No rhonchi. No increased work of breathing. No accessory muscle use. Good air exchange throughout. Abdominal: soft, non-tender, no rebound or guarding. No hepatosplenomegaly. Normal bowel sounds throughout. Back: no tenderness to palpation, no deformities, no CVA tenderness  Extremities/Musculoskeletal: Normal range of motion. no tenderness. No edema. Distal extremities are neurovasc intact. Lymphadenopathy:   No adenopathy. Neurological:  Alert and oriented to person, place, and time. Coordination normal. CN 2-12 intact. Motor and sensory function intact. Skin: Skin is warm and dry. No rash noted. No pallor. MDM  72y F here with palpitations. Appears well. Will check labs. Sounds like she needs cardiology follow-up. Procedures    ED EKG interpretation:  Rhythm: normal sinus rhythm; and regular . Rate (approx.): 84; Axis: normal; P wave: normal; QRS interval: normal ; ST/T wave: normal;  This EKG was interpreted by Mckenzie Wynn MD,ED Provider.

## 2022-10-05 LAB
ATRIAL RATE: 84 BPM
CALCULATED P AXIS, ECG09: 77 DEGREES
CALCULATED R AXIS, ECG10: 48 DEGREES
CALCULATED T AXIS, ECG11: 78 DEGREES
DIAGNOSIS, 93000: NORMAL
P-R INTERVAL, ECG05: 136 MS
Q-T INTERVAL, ECG07: 408 MS
QRS DURATION, ECG06: 84 MS
QTC CALCULATION (BEZET), ECG08: 482 MS
VENTRICULAR RATE, ECG03: 84 BPM

## 2022-12-06 NOTE — PROGRESS NOTES
[facility]                                                            OFFICE NOTE        Marilynn Blakely M.D.,IRVIN Apolinar Davidson   1955  [id number]    Date/Time:  12/6/20225:33 PM            SUBJECTIVE:  She reports palpitation mostly skipped beats happening daily which really bother her. Chest tightness is also reported from time to time on playing with her grandchildren. The tiredness last 1 to 2 minutes. He remains somewhat vague. The patient does drink several cups of coffee a day. Assessment/Plan    1. Palpitations: It discussed with the patient does have symptomatic PVCs. Nonetheless her EKG today reveals a normal sinus rhythm with normal intervals and no significant ST-T changes. Discussed options. Proceed with 48-hour Holter monitor. Discussed need for caffeine avoidance. Her thyroid is well controlled closely followed by primary care physician. Her labs potassium magnesium were essentially normal in October 2022 at the time of the emergency room visit. She continues with Adderall which may have an impact on the palpitations. There is been no change in dosing Adderall in several years now. 2.  Chest tightness: Difficult to pinpoint clinically. The patient is not a very good historian and does have nonetheless risk factors for CAD. Given also palpitations proceed with stress echocardiogram to rule out ischemia evaluate ejection fraction rule out rhythm issues during exercise. 3.  Hypertension: Well-controlled. 4.  Hyperlipidemia: Closely followed by primary care physician. ADHD: She remains on Adderall. 5.  Depression anxiety: On Zoloft. See her back after above-mentioned testing been completed.           HPI   Very pleasant 74years old female with a past medical history remarkable for AVM malformation status post surgery apparently 2007, hypertension, hyperlipidemia, ADHD, CVA, who presents with cardiac symptoms of palpitations. Past medical history: As above. Past surgical history as above aneurysm surgery, hysterectomy, left hip surgery postfracture. Social history: She smokes a pack of cigarettes every 3 days. She drinks 1 glass of wine daily. She is on disability at this time. Very active apparently. Family history: Somewhat significant coronary events on her mother side. CARDIAC STUDIES                                    EKG Results       None                IMAGING      MRI Results (most recent):  Results from Hospital Encounter encounter on 12/22/11    MRI BRAIN W AND W/O CONTRAST    Narrative  **Final Report**      ICD Codes / Adm. Diagnosis: 781. 2  373435 / ABNORMALITY OF GAIT  Examination:  MR BRAIN W AND WO CON  - [id number] - Dec 22 2011  8:23AM  Accession No:  [id number]  Reason:      REPORT:  INDICATION: dizziness. Headache. Gait disturbance 781.2    TECHNIQUE: Sagittal T1, axial FLAIR, T2, T1 and gradient echo T2-weighted  images of the head were obtained followed by intravenous infusion 10 mL  gadolinium repeat axial and coronal T1-weighted images and axial diffusion  weighted images. COMPARISON: MRI 10/03/2006, CT head 10/02/2011    Postoperative findings are noted related to the suboccipital craniotomy with  the patient's arteriovenous malformation was resected from the cerebellum. Residual hemosiderin staining in the location of the previous hemorrhage. Volume loss and enlargement of the fourth ventricle related to this. No  abnormal enhancement or other findings that would suggest an acute process. T2 hyperintensity along the prior bilateral frontal ventriculostomy tube  tracks. One or 2 other tiny nonspecific foci of T2 hyperintensity in the  cerebral white matter are nonspecific. No evidence of acute intracranial hemorrhage, mass or abnormal extra-axial  fluid collections.   No abnormal intracranial enhancement. Otherwise, the ventricular size and configuration are within normal limits. Normal flow-voids are present in the vertebral, basilar and carotid artery  systems. The craniocervical junction is otherwise unremarkable. The structures of the cranial base including paranasal sinuses are  unremarkable. IMPRESSION:  1. No unusual postoperative findings posterior fossa. 2. No acute intracranial abnormality demonstrated. Signing/Reading Doctor: Anna Aguilera ([id number])  Approved: Anna Aguilera ([id number])  12/22/2011      CT Results (most recent):  Results from [place] encounter on 10/02/11    CT HEAD WITHOUT CONTRAST    Narrative  **Final Report**      ICD Codes / Adm. Diagnosis: 82  592470 / Sore Throat  Generalized Body Aches  Examination:  CT HEAD  CON  - [id number] - Oct  2 2011  1:51PM  Accession No:  [id number]  Reason:  dizziness      REPORT:  INDICATION:  dizziness    EXAM: Unenhanced CT of the head. Comparison 07/13/2007. FINDINGS: Patient is post suboccipital decompression. Postsurgical changes  to the vermis are unchanged. No evidence of hydrocephalus. No mass lesion or  midline shift. No evidence of acute intracranial hemorrhage. IMPRESSION:  1. No change. No acute intracranial abnormality        Signing/Reading Doctor: Nano Conrad ([id number])  Approved: Nano Conrad ([id number])  10/02/2011      XR Results (most recent):  Results from Appointment encounter on 02/03/22    XR FEMUR LT 2 V    Narrative  X-rays of the left femur 4 views done today show a well aligned intertrochanteric fracture with no signs of hardware failure or loosening.           Past Medical History:   Diagnosis Date    ADD (attention deficit disorder)     Arthritis     AVM (arteriovenous malformation) brain 2009    CVA (cerebral infarction) 2009    Hypertension     Inattention      Past Surgical History:   Procedure Laterality Date    HX HEENT      HX HIP REPLACEMENT      HX HYSTERECTOMY      HX ORTHOPAEDIC      HX TONSILLECTOMY      HX WRIST FRACTURE TX      NEUROLOGICAL PROCEDURE UNLISTED      anurism surgery, AVM malformatiom     Social History     Tobacco Use    Smoking status: Every Day     Packs/day: 0.50     Years: 16.00     Pack years: 8.00     Types: Cigarettes    Smokeless tobacco: Never   Vaping Use    Vaping Use: Never used   Substance Use Topics    Alcohol use: Yes     Alcohol/week: 2.0 standard drinks     Types: 1 Glasses of wine, 1 Cans of beer per week     Comment: a few times per week    Drug use: No     Family History   Problem Relation Age of Onset    Hypertension Mother     Elevated Lipids Mother     Diabetes Father     Heart Attack Maternal Grandmother     Diabetes Paternal Grandmother     Diabetes Paternal Grandfather      No Known Allergies      There were no vitals taken for this visit. There were no vitals filed for this visit. Review of Systems:   Pertinent items are noted in the History of Present Illness. Visit Vitals  /80   Pulse 90   Resp 16   Ht 5' 6\" (1.676 m)   Wt 110 lb (49.9 kg)   SpO2 98%   BMI 17.75 kg/m²     General Appearance:  Well developed, well nourished,alert and oriented x 3, and individual in no acute distress. Ears/Nose/Mouth/Throat:   Hearing grossly normal.         Neck: Supple. Chest:   Lungs clear to auscultation bilaterally. Cardiovascular:  Regular rate and rhythm, S1, S2 normal, no murmur. Abdomen:   Soft, non-tender, bowel sounds are active. Extremities: No edema bilaterally. Skin: Warm and dry. Current Outpatient Medications on File Prior to Visit   Medication Sig Dispense Refill    aspirin delayed-release 81 mg tablet Take 81 mg by mouth daily. dextroamphetamine-amphetamine (ADDERALL) 20 mg tablet Take 20 mg by mouth daily. calcium-vitamin D (OS-DELFINO +D3) 500 mg-200 unit per tablet Take 1 Tablet by mouth three (3) times daily (with meals).  30 Tablet 0    atorvastatin (LIPITOR) 20 mg tablet Take 20 mg by mouth daily. sertraline (ZOLOFT) 50 mg tablet Take 1 Tab by mouth daily. Refills must come from psych (Patient taking differently: Take 100 mg by mouth daily. Refills must come from psych) 30 Tab 0    lisinopril (PRINIVIL, ZESTRIL) 10 mg tablet TAKE 1 TABLET BY MOUTH ONCE DAILY 90 Tab 0    triamterene-hydroCHLOROthiazide (DYAZIDE) 37.5-25 mg per capsule TAKE ONE CAPSULE BY MOUTH DAILY FOR HYPERTENSION  Indications: high blood pressure 90 Cap 1    multivitamin (ONE A DAY) tablet Take 1 Tab by mouth daily. For women over 50       No current facility-administered medications on file prior to visit. Bob Romero had no medications administered during this visit. Current Outpatient Medications   Medication Sig    aspirin delayed-release 81 mg tablet Take 81 mg by mouth daily. dextroamphetamine-amphetamine (ADDERALL) 20 mg tablet Take 20 mg by mouth daily. calcium-vitamin D (OS-DELFINO +D3) 500 mg-200 unit per tablet Take 1 Tablet by mouth three (3) times daily (with meals). atorvastatin (LIPITOR) 20 mg tablet Take 20 mg by mouth daily. sertraline (ZOLOFT) 50 mg tablet Take 1 Tab by mouth daily. Refills must come from psych (Patient taking differently: Take 100 mg by mouth daily. Refills must come from psych)    lisinopril (PRINIVIL, ZESTRIL) 10 mg tablet TAKE 1 TABLET BY MOUTH ONCE DAILY    triamterene-hydroCHLOROthiazide (DYAZIDE) 37.5-25 mg per capsule TAKE ONE CAPSULE BY MOUTH DAILY FOR HYPERTENSION  Indications: high blood pressure    multivitamin (ONE A DAY) tablet Take 1 Tab by mouth daily. For women over 50     No current facility-administered medications for this visit.          Lab Results   Component Value Date/Time    Cholesterol, total 251 (H) 07/16/2018 11:04 AM    HDL Cholesterol 81 07/16/2018 11:04 AM    LDL, calculated 150 (H) 07/16/2018 11:04 AM    VLDL, calculated 20 07/16/2018 11:04 AM    Triglyceride 100 07/16/2018 11:04 AM       Lab Results   Component Value Date/Time    Sodium 133 (L) 10/04/2022 11:59 AM    Potassium 3.8 10/04/2022 11:59 AM    Chloride 96 (L) 10/04/2022 11:59 AM    CO2 29 10/04/2022 11:59 AM    Anion gap 8 10/04/2022 11:59 AM    Glucose 105 (H) 10/04/2022 11:59 AM    BUN 15 10/04/2022 11:59 AM    Creatinine 0.73 10/04/2022 11:59 AM    BUN/Creatinine ratio 21 (H) 10/04/2022 11:59 AM    GFR est AA >60 12/27/2021 02:37 AM    GFR est non-AA >60 12/27/2021 02:37 AM    Calcium 9.4 10/04/2022 11:59 AM       Lab Results   Component Value Date/Time    ALT (SGPT) 24 10/04/2022 11:59 AM    Alk. phosphatase 70 10/04/2022 11:59 AM    Bilirubin, total 0.8 10/04/2022 11:59 AM       Lab Results   Component Value Date/Time    WBC 6.4 10/04/2022 11:59 AM    Hemoglobin (POC) 16.7 (H) 10/02/2011 01:42 PM    HGB 15.1 10/04/2022 11:59 AM    Hematocrit (POC) 49 (H) 10/02/2011 01:42 PM    HCT 45.4 10/04/2022 11:59 AM    PLATELET 206 36/90/4079 11:59 AM    MCV 99.3 (H) 10/04/2022 11:59 AM       Lab Results   Component Value Date/Time    TSH 0.98 10/04/2022 11:59 AM         Lab Results   Component Value Date/Time    Cholesterol, total 251 (H) 07/16/2018 11:04 AM    Cholesterol, total 214 (H) 07/21/2016 11:52 AM    HDL Cholesterol 81 07/16/2018 11:04 AM    HDL Cholesterol 76 07/21/2016 11:52 AM    LDL, calculated 150 (H) 07/16/2018 11:04 AM    LDL, calculated 124 (H) 07/21/2016 11:52 AM    Triglyceride 100 07/16/2018 11:04 AM    Triglyceride 68 07/21/2016 11:52 AM                Please note that this dictation was completed with [employer], the [employer] voice recognition software. Quite often unanticipated grammatical, syntax, homophones, and other interpretative errors are inadvertently transcribed by the computer software. Please disregard these errors. Please excuse any errors that have escaped final proofreading.

## 2022-12-07 ENCOUNTER — OFFICE VISIT (OUTPATIENT)
Dept: CARDIOLOGY CLINIC | Age: 67
End: 2022-12-07
Payer: MEDICARE

## 2022-12-07 ENCOUNTER — CLINICAL SUPPORT (OUTPATIENT)
Dept: CARDIOLOGY CLINIC | Age: 67
End: 2022-12-07

## 2022-12-07 VITALS
BODY MASS INDEX: 17.68 KG/M2 | WEIGHT: 110 LBS | SYSTOLIC BLOOD PRESSURE: 138 MMHG | RESPIRATION RATE: 16 BRPM | HEIGHT: 66 IN | HEART RATE: 90 BPM | OXYGEN SATURATION: 98 % | DIASTOLIC BLOOD PRESSURE: 80 MMHG

## 2022-12-07 DIAGNOSIS — R00.2 PALPITATIONS: Primary | ICD-10-CM

## 2022-12-07 DIAGNOSIS — I10 BENIGN ESSENTIAL HYPERTENSION: Primary | ICD-10-CM

## 2022-12-07 DIAGNOSIS — R07.89 CHEST DISCOMFORT: ICD-10-CM

## 2022-12-07 NOTE — PROGRESS NOTES
Applied 48 hr holter per Dr Randall Gonzalez dx: palps. Pt has #[id number]. Chargeable visit.   preventice

## 2022-12-07 NOTE — PROGRESS NOTES
Visit Vitals  /80   Pulse 90   Resp 16   Ht 5' 6\" (1.676 m)   Wt 110 lb (49.9 kg)   SpO2 98%   BMI 17.75 kg/m²

## 2023-01-25 ENCOUNTER — ANCILLARY PROCEDURE (OUTPATIENT)
Dept: CARDIOLOGY CLINIC | Age: 68
End: 2023-01-25
Payer: MEDICARE

## 2023-01-25 ENCOUNTER — APPOINTMENT (OUTPATIENT)
Dept: CARDIOLOGY CLINIC | Age: 68
End: 2023-01-25

## 2023-01-25 VITALS
DIASTOLIC BLOOD PRESSURE: 80 MMHG | BODY MASS INDEX: 17.68 KG/M2 | WEIGHT: 110 LBS | HEIGHT: 66 IN | SYSTOLIC BLOOD PRESSURE: 130 MMHG

## 2023-01-25 DIAGNOSIS — R07.89 CHEST DISCOMFORT: ICD-10-CM

## 2023-01-25 DIAGNOSIS — I10 BENIGN ESSENTIAL HYPERTENSION: ICD-10-CM

## 2023-01-25 PROCEDURE — 93351 STRESS TTE COMPLETE: CPT | Performed by: SPECIALIST

## 2023-01-27 LAB
STRESS ANGINA INDEX: 0
STRESS BASELINE DIAS BP: 80 MMHG
STRESS BASELINE HR: 95 BPM
STRESS BASELINE SYS BP: 130 MMHG
STRESS ESTIMATED WORKLOAD: 5.3 METS
STRESS EXERCISE DUR MIN: 7 MIN
STRESS EXERCISE DUR SEC: 1 SEC
STRESS O2 SAT PEAK: 97 %
STRESS O2 SAT REST: 97 %
STRESS PEAK DIAS BP: 90 MMHG
STRESS PEAK SYS BP: 190 MMHG
STRESS PERCENT HR ACHIEVED: 109 %
STRESS POST PEAK HR: 169 BPM
STRESS RATE PRESSURE PRODUCT: NORMAL BPM*MMHG
STRESS TARGET HR: 155 BPM

## 2023-01-31 ENCOUNTER — OFFICE VISIT (OUTPATIENT)
Dept: CARDIOLOGY CLINIC | Age: 68
End: 2023-01-31
Payer: MEDICARE

## 2023-01-31 VITALS
HEART RATE: 105 BPM | RESPIRATION RATE: 16 BRPM | BODY MASS INDEX: 17.68 KG/M2 | DIASTOLIC BLOOD PRESSURE: 80 MMHG | WEIGHT: 110 LBS | OXYGEN SATURATION: 99 % | SYSTOLIC BLOOD PRESSURE: 120 MMHG | HEIGHT: 66 IN

## 2023-01-31 DIAGNOSIS — R00.2 PALPITATIONS: Primary | ICD-10-CM

## 2023-01-31 PROCEDURE — G9717 DOC PT DX DEP/BP F/U NT REQ: HCPCS | Performed by: SPECIALIST

## 2023-01-31 PROCEDURE — 99214 OFFICE O/P EST MOD 30 MIN: CPT | Performed by: SPECIALIST

## 2023-01-31 PROCEDURE — 3074F SYST BP LT 130 MM HG: CPT | Performed by: SPECIALIST

## 2023-01-31 PROCEDURE — G8399 PT W/DXA RESULTS DOCUMENT: HCPCS | Performed by: SPECIALIST

## 2023-01-31 PROCEDURE — G8427 DOCREV CUR MEDS BY ELIG CLIN: HCPCS | Performed by: SPECIALIST

## 2023-01-31 PROCEDURE — 3017F COLORECTAL CA SCREEN DOC REV: CPT | Performed by: SPECIALIST

## 2023-01-31 PROCEDURE — 1123F ACP DISCUSS/DSCN MKR DOCD: CPT | Performed by: SPECIALIST

## 2023-01-31 PROCEDURE — 1090F PRES/ABSN URINE INCON ASSESS: CPT | Performed by: SPECIALIST

## 2023-01-31 PROCEDURE — G8536 NO DOC ELDER MAL SCRN: HCPCS | Performed by: SPECIALIST

## 2023-01-31 PROCEDURE — 3079F DIAST BP 80-89 MM HG: CPT | Performed by: SPECIALIST

## 2023-01-31 PROCEDURE — 1101F PT FALLS ASSESS-DOCD LE1/YR: CPT | Performed by: SPECIALIST

## 2023-01-31 PROCEDURE — G0463 HOSPITAL OUTPT CLINIC VISIT: HCPCS | Performed by: SPECIALIST

## 2023-01-31 PROCEDURE — 93005 ELECTROCARDIOGRAM TRACING: CPT | Performed by: SPECIALIST

## 2023-01-31 PROCEDURE — G8419 CALC BMI OUT NRM PARAM NOF/U: HCPCS | Performed by: SPECIALIST

## 2023-01-31 PROCEDURE — 93010 ELECTROCARDIOGRAM REPORT: CPT | Performed by: SPECIALIST

## 2023-01-31 RX ORDER — METOPROLOL SUCCINATE 25 MG/1
25 TABLET, EXTENDED RELEASE ORAL EVERY EVENING
Qty: 90 TABLET | Refills: 1 | Status: SHIPPED | OUTPATIENT
Start: 2023-01-31

## 2023-01-31 NOTE — PROGRESS NOTES
[address] [facility]                                                            OFFICE NOTE        Carroll Laureano M.D.,OLIVERIOLavonne Reyes   1955  [id number]    Date/Time:  1/31/20237:10 AM            SUBJECTIVE:  Doing ok still with palpitations mostly at rest   No cp reported and no sob       Assessment/Plan  1. Palpitations: It discussed with the patient does have symptomatic PVCs. Discussed stress echo negative for ischemia but pvcs noted    Discussed options. Discussed 48-hour Holter monitor with 1.84% pvcs    Discussed need for caffeine avoidance. Her thyroid is well controlled closely followed by primary care physician. Her labs potassium magnesium were essentially normal in October 2022 at the time of the emergency room visit. She continues with Adderall which may have an impact on the palpitations. There is been no change in dosing Adderall in several years now. Since she remains symptomatic we have discussed options, start toprol xl 25 mg qhs    She is aware of possible side effects and willing to try    2. Chest tightness:resolved, normal stress echo beside frequent pvcs    3. Hypertension: Well-controlled. 4.  Hyperlipidemia: Closely followed by primary care physician. ADHD: She remains on Adderall. 5.  Depression anxiety: On Zoloft. Patient aware of negative effects of toprol on depression but willing to try    See her back in 6 weeks otherwise              HPI   Very pleasant 74years old female with a past medical history remarkable for AVM malformation status post surgery apparently 2007, hypertension, hyperlipidemia, ADHD, CVA, who presents with cardiac symptoms of palpitations. Past medical history: As above. Past surgical history as above aneurysm surgery, hysterectomy, left hip surgery postfracture.     Social history: She smokes a pack of cigarettes every 3 days. She drinks 1 glass of wine daily. She is on disability at this time. Very active apparently. Family history: Somewhat significant coronary events on her mother side. CARDIAC STUDIES        01/25/23    ECHO STRESS 01/27/2023 1/30/2023    Interpretation Summary    Study Impression: The study is negative for echocardiographic evidence of ischemia. frequent PVCs noted during exercise    Post-stress Echo: The post-stress echo showed normal wall motion. Left Ventricle: Normal left ventricular systolic function with a visually estimated EF of 55 - 60%. Left ventricle size is normal. Normal wall thickness. Normal wall motion. ECG: Resting ECG demonstrates normal sinus rhythm. ECG: Stress ECG was negative for ischemia. Stress Test: A modified Bob protocol stress test was performed. Overall, the patient's exercise capacity was below average for their age. The patient reached stage 2 of the protocol And was stressed for 7 min and 1 sec. Hemodynamics are adequate for diagnosis. Blood pressure demonstrated a normal response and heart rate demonstrated a normal response to stress. The patient's heart rate recovery was normal.    Stress ECG: Arrhythmias during stress: frequent multifocal PVCs. Signed by: Suly Wells MD on 1/27/2023  5:13 PM                              EKG Results       None                IMAGING      MRI Results (most recent):  Results from Hospital Encounter encounter on 12/22/11    MRI BRAIN W AND W/O CONTRAST    Narrative  **Final Report**      ICD Codes / Adm. Diagnosis: 781. 2  739282 / ABNORMALITY OF GAIT  Examination:  MR BRAIN W AND WO CON  - [id number] - Dec 22 2011  8:23AM  Accession No:  [id number]  Reason:      REPORT:  INDICATION: dizziness. Headache.  Gait disturbance 781.2    TECHNIQUE: Sagittal T1, axial FLAIR, T2, T1 and gradient echo T2-weighted  images of the head were obtained followed by intravenous infusion 10 mL  gadolinium repeat axial and coronal T1-weighted images and axial diffusion  weighted images. COMPARISON: MRI 10/03/2006, CT head 10/02/2011    Postoperative findings are noted related to the suboccipital craniotomy with  the patient's arteriovenous malformation was resected from the cerebellum. Residual hemosiderin staining in the location of the previous hemorrhage. Volume loss and enlargement of the fourth ventricle related to this. No  abnormal enhancement or other findings that would suggest an acute process. T2 hyperintensity along the prior bilateral frontal ventriculostomy tube  tracks. One or 2 other tiny nonspecific foci of T2 hyperintensity in the  cerebral white matter are nonspecific. No evidence of acute intracranial hemorrhage, mass or abnormal extra-axial  fluid collections. No abnormal intracranial enhancement. Otherwise, the ventricular size and configuration are within normal limits. Normal flow-voids are present in the vertebral, basilar and carotid artery  systems. The craniocervical junction is otherwise unremarkable. The structures of the cranial base including paranasal sinuses are  unremarkable. IMPRESSION:  1. No unusual postoperative findings posterior fossa. 2. No acute intracranial abnormality demonstrated. Signing/Reading Doctor: Jay Eastman ([id number])  Approved: Jay Eastman ([id number])  12/22/2011      CT Results (most recent):  Results from [place] encounter on 10/02/11    CT HEAD WITHOUT CONTRAST    Narrative  **Final Report**      ICD Codes / Adm. Diagnosis: 82  868887 / Sore Throat  Generalized Body Aches  Examination:  CT HEAD  CON  - [id number] - Oct  2 2011  1:51PM  Accession No:  [id number]  Reason:  dizziness      REPORT:  INDICATION:  dizziness    EXAM: Unenhanced CT of the head. Comparison 07/13/2007. FINDINGS: Patient is post suboccipital decompression. Postsurgical changes  to the vermis are unchanged. No evidence of hydrocephalus.  No mass lesion or  midline shift. No evidence of acute intracranial hemorrhage. IMPRESSION:  1. No change. No acute intracranial abnormality        Signing/Reading Doctor: Anup Joseph ([id number])  Approved: Anup Joseph ([id number])  10/02/2011      XR Results (most recent):  Results from Appointment encounter on 02/03/22    XR FEMUR LT 2 V    Narrative  X-rays of the left femur 4 views done today show a well aligned intertrochanteric fracture with no signs of hardware failure or loosening. Past Medical History:   Diagnosis Date    ADD (attention deficit disorder)     Arthritis     AVM (arteriovenous malformation) brain 2009    CVA (cerebral infarction) 2009    Hypertension     Inattention      Past Surgical History:   Procedure Laterality Date    HX HEENT      HX HIP REPLACEMENT      HX HYSTERECTOMY      HX ORTHOPAEDIC      HX TONSILLECTOMY      HX WRIST FRACTURE TX      NEUROLOGICAL PROCEDURE UNLISTED      anurism surgery, AVM malformatiom     Social History     Tobacco Use    Smoking status: Every Day     Packs/day: 0.50     Years: 16.00     Pack years: 8.00     Types: Cigarettes    Smokeless tobacco: Never   Vaping Use    Vaping Use: Never used   Substance Use Topics    Alcohol use: Yes     Alcohol/week: 2.0 standard drinks     Types: 1 Glasses of wine, 1 Cans of beer per week     Comment: a few times per week    Drug use: No     Family History   Problem Relation Age of Onset    Hypertension Mother     Elevated Lipids Mother     Diabetes Father     Heart Attack Maternal Grandmother     Diabetes Paternal Grandmother     Diabetes Paternal Grandfather      No Known Allergies      There were no vitals taken for this visit. There were no vitals filed for this visit. Review of Systems:   Pertinent items are noted in the History of Present Illness.        Visit Vitals  /80   Pulse (!) 105   Resp 16   Ht 5' 6\" (1.676 m)   Wt 110 lb (49.9 kg)   SpO2 99%   BMI 17.75 kg/m²     General Appearance:  Well developed, well nourished,alert and oriented x 3, and individual in no acute distress. Ears/Nose/Mouth/Throat:   Hearing grossly normal.         Neck: Supple. Chest:   Lungs clear to auscultation bilaterally. Cardiovascular:  Regular rate and rhythm, S1, S2 normal, no murmur. Abdomen:   Soft, non-tender, bowel sounds are active. Extremities: No edema bilaterally. Skin: Warm and dry. Current Outpatient Medications on File Prior to Visit   Medication Sig Dispense Refill    aspirin delayed-release 81 mg tablet Take 81 mg by mouth daily. dextroamphetamine-amphetamine (ADDERALL) 20 mg tablet Take 20 mg by mouth daily. calcium-vitamin D (OS-DELFINO +D3) 500 mg-200 unit per tablet Take 1 Tablet by mouth three (3) times daily (with meals). 30 Tablet 0    atorvastatin (LIPITOR) 20 mg tablet Take 20 mg by mouth daily. sertraline (ZOLOFT) 50 mg tablet Take 1 Tab by mouth daily. Refills must come from psych (Patient taking differently: Take 100 mg by mouth daily. Refills must come from psych) 30 Tab 0    lisinopril (PRINIVIL, ZESTRIL) 10 mg tablet TAKE 1 TABLET BY MOUTH ONCE DAILY 90 Tab 0    triamterene-hydroCHLOROthiazide (DYAZIDE) 37.5-25 mg per capsule TAKE ONE CAPSULE BY MOUTH DAILY FOR HYPERTENSION  Indications: high blood pressure 90 Cap 1    multivitamin (ONE A DAY) tablet Take 1 Tab by mouth daily. For women over 50       No current facility-administered medications on file prior to visit. [facility] had no medications administered during this visit. Current Outpatient Medications   Medication Sig    aspirin delayed-release 81 mg tablet Take 81 mg by mouth daily. dextroamphetamine-amphetamine (ADDERALL) 20 mg tablet Take 20 mg by mouth daily. calcium-vitamin D (OS-DELFINO +D3) 500 mg-200 unit per tablet Take 1 Tablet by mouth three (3) times daily (with meals). atorvastatin (LIPITOR) 20 mg tablet Take 20 mg by mouth daily.     sertraline (ZOLOFT) 50 mg tablet Take 1 Tab by mouth daily. Refills must come from psych (Patient taking differently: Take 100 mg by mouth daily. Refills must come from psych)    lisinopril (PRINIVIL, ZESTRIL) 10 mg tablet TAKE 1 TABLET BY MOUTH ONCE DAILY    triamterene-hydroCHLOROthiazide (DYAZIDE) 37.5-25 mg per capsule TAKE ONE CAPSULE BY MOUTH DAILY FOR HYPERTENSION  Indications: high blood pressure    multivitamin (ONE A DAY) tablet Take 1 Tab by mouth daily. For women over 50     No current facility-administered medications for this visit. Lab Results   Component Value Date/Time    Cholesterol, total 251 (H) 07/16/2018 11:04 AM    HDL Cholesterol 81 07/16/2018 11:04 AM    LDL, calculated 150 (H) 07/16/2018 11:04 AM    VLDL, calculated 20 07/16/2018 11:04 AM    Triglyceride 100 07/16/2018 11:04 AM       Lab Results   Component Value Date/Time    Sodium 133 (L) 10/04/2022 11:59 AM    Potassium 3.8 10/04/2022 11:59 AM    Chloride 96 (L) 10/04/2022 11:59 AM    CO2 29 10/04/2022 11:59 AM    Anion gap 8 10/04/2022 11:59 AM    Glucose 105 (H) 10/04/2022 11:59 AM    BUN 15 10/04/2022 11:59 AM    Creatinine 0.73 10/04/2022 11:59 AM    BUN/Creatinine ratio 21 (H) 10/04/2022 11:59 AM    GFR est AA >60 12/27/2021 02:37 AM    GFR est non-AA >60 12/27/2021 02:37 AM    Calcium 9.4 10/04/2022 11:59 AM       Lab Results   Component Value Date/Time    ALT (SGPT) 24 10/04/2022 11:59 AM    Alk.  phosphatase 70 10/04/2022 11:59 AM    Bilirubin, total 0.8 10/04/2022 11:59 AM       Lab Results   Component Value Date/Time    WBC 6.4 10/04/2022 11:59 AM    Hemoglobin (POC) 16.7 (H) 10/02/2011 01:42 PM    HGB 15.1 10/04/2022 11:59 AM    Hematocrit (POC) 49 (H) 10/02/2011 01:42 PM    HCT 45.4 10/04/2022 11:59 AM    PLATELET 538 84/52/0820 11:59 AM    MCV 99.3 (H) 10/04/2022 11:59 AM       Lab Results   Component Value Date/Time    TSH 0.98 10/04/2022 11:59 AM         Lab Results   Component Value Date/Time    Cholesterol, total 251 (H) 07/16/2018 11:04 AM Cholesterol, total 214 (H) 07/21/2016 11:52 AM    HDL Cholesterol 81 07/16/2018 11:04 AM    HDL Cholesterol 76 07/21/2016 11:52 AM    LDL, calculated 150 (H) 07/16/2018 11:04 AM    LDL, calculated 124 (H) 07/21/2016 11:52 AM    Triglyceride 100 07/16/2018 11:04 AM    Triglyceride 68 07/21/2016 11:52 AM                Please note that this dictation was completed with [employer], the computer voice recognition software. Quite often unanticipated grammatical, syntax, homophones, and other interpretative errors are inadvertently transcribed by the computer software. Please disregard these errors. Please excuse any errors that have escaped final proofreading.

## 2023-01-31 NOTE — PROGRESS NOTES
Visit Vitals  /80   Pulse (!) 105   Resp 16   Ht 5' 6\" (1.676 m)   Wt 110 lb (49.9 kg)   SpO2 99%   BMI 17.75 kg/m²

## 2023-03-07 ENCOUNTER — OFFICE VISIT (OUTPATIENT)
Dept: CARDIOLOGY CLINIC | Age: 68
End: 2023-03-07
Payer: MEDICARE

## 2023-03-07 VITALS
BODY MASS INDEX: 18 KG/M2 | RESPIRATION RATE: 18 BRPM | OXYGEN SATURATION: 99 % | WEIGHT: 112 LBS | HEIGHT: 66 IN | HEART RATE: 87 BPM | DIASTOLIC BLOOD PRESSURE: 80 MMHG | SYSTOLIC BLOOD PRESSURE: 110 MMHG

## 2023-03-07 DIAGNOSIS — Q28.2 AVM (ARTERIOVENOUS MALFORMATION) BRAIN: ICD-10-CM

## 2023-03-07 DIAGNOSIS — I10 BENIGN ESSENTIAL HYPERTENSION: Primary | ICD-10-CM

## 2023-03-07 DIAGNOSIS — F90.9 ATTENTION DEFICIT HYPERACTIVITY DISORDER (ADHD), UNSPECIFIED ADHD TYPE: ICD-10-CM

## 2023-03-07 PROCEDURE — 93010 ELECTROCARDIOGRAM REPORT: CPT | Performed by: SPECIALIST

## 2023-03-07 PROCEDURE — G8536 NO DOC ELDER MAL SCRN: HCPCS | Performed by: SPECIALIST

## 2023-03-07 PROCEDURE — 1090F PRES/ABSN URINE INCON ASSESS: CPT | Performed by: SPECIALIST

## 2023-03-07 PROCEDURE — 93005 ELECTROCARDIOGRAM TRACING: CPT | Performed by: SPECIALIST

## 2023-03-07 PROCEDURE — G8419 CALC BMI OUT NRM PARAM NOF/U: HCPCS | Performed by: SPECIALIST

## 2023-03-07 PROCEDURE — 3074F SYST BP LT 130 MM HG: CPT | Performed by: SPECIALIST

## 2023-03-07 PROCEDURE — 99213 OFFICE O/P EST LOW 20 MIN: CPT | Performed by: SPECIALIST

## 2023-03-07 PROCEDURE — 1123F ACP DISCUSS/DSCN MKR DOCD: CPT | Performed by: SPECIALIST

## 2023-03-07 PROCEDURE — 3017F COLORECTAL CA SCREEN DOC REV: CPT | Performed by: SPECIALIST

## 2023-03-07 PROCEDURE — 3079F DIAST BP 80-89 MM HG: CPT | Performed by: SPECIALIST

## 2023-03-07 PROCEDURE — G8399 PT W/DXA RESULTS DOCUMENT: HCPCS | Performed by: SPECIALIST

## 2023-03-07 PROCEDURE — G0463 HOSPITAL OUTPT CLINIC VISIT: HCPCS | Performed by: SPECIALIST

## 2023-03-07 PROCEDURE — 1101F PT FALLS ASSESS-DOCD LE1/YR: CPT | Performed by: SPECIALIST

## 2023-03-07 PROCEDURE — G8427 DOCREV CUR MEDS BY ELIG CLIN: HCPCS | Performed by: SPECIALIST

## 2023-03-07 PROCEDURE — G9717 DOC PT DX DEP/BP F/U NT REQ: HCPCS | Performed by: SPECIALIST

## 2023-03-07 RX ORDER — MIRTAZAPINE 30 MG/1
30 TABLET, FILM COATED ORAL
COMMUNITY
Start: 2023-02-08 | End: 2024-02-08

## 2023-03-07 NOTE — PROGRESS NOTES
[address] [facility]                                                            OFFICE NOTE        Gatito Pruitt M.D.,OLIVERIO. Adam Mode   1955  [id number]    Date/Time:  3/7/702288:56 AM            SUBJECTIVE:  She is feeling much better on toprol   Palpitations she tells me resolved       Assessment/Plan    1. Palpitations: discussed with the patient does have symptomatic PVCs. Discussed stress echo of 1/23  negative for ischemia but pvcs noted    Discussed 48-hour Holter monitor with 1.84% pvcs    Discussed need for caffeine avoidance. Her thyroid is well controlled closely followed by primary care physician. Her labs potassium magnesium were essentially normal in October 2022 at the time of the emergency room visit. She continues with Adderall which may have an impact on the palpitations. There is been no change in dosing Adderall in several years now. Now much better on toprol with no palpitations or reported side effects  This will be for now continued    2. Chest tightness:resolved, normal stress echo beside frequent pvcs    3. Hypertension: Well-controlled. 4.  Hyperlipidemia: Closely followed by primary care physician. ADHD: She remains on Adderall. 5.  Depression anxiety: On Zoloft. Patient aware of negative effects of toprol on depression and will let me know if any changes    See her back in 6 months otherwise         HPI     Very pleasant 74years old female with a past medical history remarkable for AVM malformation status post surgery apparently 2007, hypertension, hyperlipidemia, ADHD, CVA, who presents with cardiac symptoms of palpitations. Past medical history: As above. Past surgical history as above aneurysm surgery, hysterectomy, left hip surgery postfracture. Social history: She smokes a pack of cigarettes every 3 days.   She drinks 1 glass of wine daily. She is on disability at this time. Very active apparently. Family history: Somewhat significant coronary events on her mother side. CARDIAC STUDIES        01/25/23    ECHO STRESS 01/27/2023 1/30/2023    Interpretation Summary    Study Impression: The study is negative for echocardiographic evidence of ischemia. frequent PVCs noted during exercise    Post-stress Echo: The post-stress echo showed normal wall motion. Left Ventricle: Normal left ventricular systolic function with a visually estimated EF of 55 - 60%. Left ventricle size is normal. Normal wall thickness. Normal wall motion. ECG: Resting ECG demonstrates normal sinus rhythm. ECG: Stress ECG was negative for ischemia. Stress Test: A modified Bob protocol stress test was performed. Overall, the patient's exercise capacity was below average for their age. The patient reached stage 2 of the protocol And was stressed for 7 min and 1 sec. Hemodynamics are adequate for diagnosis. Blood pressure demonstrated a normal response and heart rate demonstrated a normal response to stress. The patient's heart rate recovery was normal.    Stress ECG: Arrhythmias during stress: frequent multifocal PVCs. Signed by: Leanne Iyer MD on 1/27/2023  5:13 PM                              EKG Results       Procedure 720 Value Units Date/Time    AMB POC EKG ROUTINE W/ 12 LEADS, INTER & REP [[id number]] Resulted: 03/07/23 1047    Order Status: Completed Updated: 03/07/23 1050                IMAGING      MRI Results (most recent):  Results from [place] encounter on 12/22/11    MRI BRAIN W AND W/O CONTRAST    Narrative  **Final Report**      ICD Codes / Adm. Diagnosis: 781. 2  599312 / ABNORMALITY OF GAIT  Examination:  MR BRAIN W AND WO CON  - [id number] - Dec 22 2011  8:23AM  Accession No:  [id number]  Reason:      REPORT:  INDICATION: dizziness. Headache.  Gait disturbance 781.2    TECHNIQUE: Sagittal T1, axial FLAIR, T2, T1 and gradient echo T2-weighted  images of the head were obtained followed by intravenous infusion 10 mL  gadolinium repeat axial and coronal T1-weighted images and axial diffusion  weighted images. COMPARISON: MRI 10/03/2006, CT head 10/02/2011    Postoperative findings are noted related to the suboccipital craniotomy with  the patient's arteriovenous malformation was resected from the cerebellum. Residual hemosiderin staining in the location of the previous hemorrhage. Volume loss and enlargement of the fourth ventricle related to this. No  abnormal enhancement or other findings that would suggest an acute process. T2 hyperintensity along the prior bilateral frontal ventriculostomy tube  tracks. One or 2 other tiny nonspecific foci of T2 hyperintensity in the  cerebral white matter are nonspecific. No evidence of acute intracranial hemorrhage, mass or abnormal extra-axial  fluid collections. No abnormal intracranial enhancement. Otherwise, the ventricular size and configuration are within normal limits. Normal flow-voids are present in the vertebral, basilar and carotid artery  systems. The craniocervical junction is otherwise unremarkable. The structures of the cranial base including paranasal sinuses are  unremarkable. IMPRESSION:  1. No unusual postoperative findings posterior fossa. 2. No acute intracranial abnormality demonstrated. Signing/Reading Doctor: Arleen Minaya ([id number])  Approved: Arleen Minaya ([id number])  12/22/2011      CT Results (most recent):  Results from [place] encounter on 10/02/11    CT HEAD WITHOUT CONTRAST    Narrative  **Final Report**      ICD Codes / Adm. Diagnosis: 82  725008 / Sore Throat  Generalized Body Aches  Examination:  CT HEAD  CON  - [id number] - Oct  2 2011  1:51PM  Accession No:  [id number]  Reason:  dizziness      REPORT:  INDICATION:  dizziness    EXAM: Unenhanced CT of the head. Comparison 07/13/2007.     FINDINGS: Patient is post suboccipital decompression. Postsurgical changes  to the vermis are unchanged. No evidence of hydrocephalus. No mass lesion or  midline shift. No evidence of acute intracranial hemorrhage. IMPRESSION:  1. No change. No acute intracranial abnormality        Signing/Reading Doctor: Sarah Grossman ([id number])  Approved: Sarah Grossman ([id number])  10/02/2011      XR Results (most recent):  Results from Appointment encounter on 02/03/22    XR FEMUR LT 2 V    Narrative  X-rays of the left femur 4 views done today show a well aligned intertrochanteric fracture with no signs of hardware failure or loosening. Past Medical History:   Diagnosis Date    ADD (attention deficit disorder)     Arthritis     AVM (arteriovenous malformation) brain 2009    CVA (cerebral infarction) 2009    Hypertension     Inattention      Past Surgical History:   Procedure Laterality Date    HX HEENT      HX HIP REPLACEMENT      HX HYSTERECTOMY      HX ORTHOPAEDIC      HX TONSILLECTOMY      HX WRIST FRACTURE TX      [place] UNLISTED NEUROLOGICAL/NEUROMUSCULAR DX PX      anurism surgery, AVM malformatiom     Social History     Tobacco Use    Smoking status: Every Day     Packs/day: 0.50     Years: 16.00     Pack years: 8.00     Types: Cigarettes    Smokeless tobacco: Never   Vaping Use    Vaping Use: Never used   Substance Use Topics    Alcohol use:  Yes     Alcohol/week: 2.0 standard drinks     Types: 1 Glasses of wine, 1 Cans of beer per week     Comment: a few times per week    Drug use: No     Family History   Problem Relation Age of Onset    Hypertension Mother     Elevated Lipids Mother     Diabetes Father     Heart Attack Maternal Grandmother     Diabetes Paternal Grandmother     Diabetes Paternal Grandfather      No Known Allergies      Visit Vitals  /80 (BP 1 Location: Left upper arm, BP Patient Position: Sitting, BP Cuff Size: Adult)   Pulse 87   Resp 18   Ht 5' 6\" (1.676 m)   Wt 112 lb (50.8 kg)   SpO2 99%   BMI 18.08 kg/m² Last 3 Recorded Weights in this Encounter    03/07/23 1030   Weight: 112 lb (50.8 kg)            Review of Systems:   Pertinent items are noted in the History of Present Illness. Visit Vitals  /80 (BP 1 Location: Left upper arm, BP Patient Position: Sitting, BP Cuff Size: Adult)   Pulse 87   Resp 18   Ht 5' 6\" (1.676 m)   Wt 112 lb (50.8 kg)   SpO2 99%   BMI 18.08 kg/m²     General Appearance:  Well developed, well nourished,alert and oriented x 3, and individual in no acute distress. Ears/Nose/Mouth/Throat:   Hearing grossly normal.         Neck: Supple. Chest:   Lungs clear to auscultation bilaterally. Cardiovascular:  Regular rate and rhythm, S1, S2 normal, no murmur. Abdomen:   Soft, non-tender, bowel sounds are active. Extremities: No edema bilaterally. Skin: Warm and dry. Current Outpatient Medications on File Prior to Visit   Medication Sig Dispense Refill    mirtazapine (REMERON) 30 mg tablet Take 30 mg by mouth.      metoprolol succinate (TOPROL-XL) 25 mg XL tablet Take 1 Tablet by mouth every evening. 90 Tablet 1    aspirin delayed-release 81 mg tablet Take 81 mg by mouth daily. dextroamphetamine-amphetamine (ADDERALL) 20 mg tablet Take 20 mg by mouth daily. calcium-vitamin D (OS-DELFINO +D3) 500 mg-200 unit per tablet Take 1 Tablet by mouth three (3) times daily (with meals). 30 Tablet 0    atorvastatin (LIPITOR) 20 mg tablet Take 20 mg by mouth daily. sertraline (ZOLOFT) 50 mg tablet Take 1 Tab by mouth daily. Refills must come from psych (Patient taking differently: Take 100 mg by mouth daily. Refills must come from psych) 30 Tab 0    lisinopril (PRINIVIL, ZESTRIL) 10 mg tablet TAKE 1 TABLET BY MOUTH ONCE DAILY 90 Tab 0    triamterene-hydroCHLOROthiazide (DYAZIDE) 37.5-25 mg per capsule TAKE ONE CAPSULE BY MOUTH DAILY FOR HYPERTENSION  Indications: high blood pressure 90 Cap 1    multivitamin (ONE A DAY) tablet Take 1 Tab by mouth daily.  For women over 50       No current facility-administered medications on file prior to visit. Andrew Gibbs had no medications administered during this visit. Current Outpatient Medications   Medication Sig    mirtazapine (REMERON) 30 mg tablet Take 30 mg by mouth.    metoprolol succinate (TOPROL-XL) 25 mg XL tablet Take 1 Tablet by mouth every evening. aspirin delayed-release 81 mg tablet Take 81 mg by mouth daily. dextroamphetamine-amphetamine (ADDERALL) 20 mg tablet Take 20 mg by mouth daily. calcium-vitamin D (OS-DELFINO +D3) 500 mg-200 unit per tablet Take 1 Tablet by mouth three (3) times daily (with meals). atorvastatin (LIPITOR) 20 mg tablet Take 20 mg by mouth daily. sertraline (ZOLOFT) 50 mg tablet Take 1 Tab by mouth daily. Refills must come from psych (Patient taking differently: Take 100 mg by mouth daily. Refills must come from psych)    lisinopril (PRINIVIL, ZESTRIL) 10 mg tablet TAKE 1 TABLET BY MOUTH ONCE DAILY    triamterene-hydroCHLOROthiazide (DYAZIDE) 37.5-25 mg per capsule TAKE ONE CAPSULE BY MOUTH DAILY FOR HYPERTENSION  Indications: high blood pressure    multivitamin (ONE A DAY) tablet Take 1 Tab by mouth daily. For women over 50     No current facility-administered medications for this visit.          Lab Results   Component Value Date/Time    Cholesterol, total 251 (H) 07/16/2018 11:04 AM    HDL Cholesterol 81 07/16/2018 11:04 AM    LDL, calculated 150 (H) 07/16/2018 11:04 AM    VLDL, calculated 20 07/16/2018 11:04 AM    Triglyceride 100 07/16/2018 11:04 AM       Lab Results   Component Value Date/Time    Sodium 133 (L) 10/04/2022 11:59 AM    Potassium 3.8 10/04/2022 11:59 AM    Chloride 96 (L) 10/04/2022 11:59 AM    CO2 29 10/04/2022 11:59 AM    Anion gap 8 10/04/2022 11:59 AM    Glucose 105 (H) 10/04/2022 11:59 AM    BUN 15 10/04/2022 11:59 AM    Creatinine 0.73 10/04/2022 11:59 AM    BUN/Creatinine ratio 21 (H) 10/04/2022 11:59 AM    GFR est AA >60 12/27/2021 02:37 AM GFR est non-AA >60 12/27/2021 02:37 AM    Calcium 9.4 10/04/2022 11:59 AM       Lab Results   Component Value Date/Time    ALT (SGPT) 24 10/04/2022 11:59 AM    Alk. phosphatase 70 10/04/2022 11:59 AM    Bilirubin, total 0.8 10/04/2022 11:59 AM       Lab Results   Component Value Date/Time    WBC 6.4 10/04/2022 11:59 AM    Hemoglobin (POC) 16.7 (H) 10/02/2011 01:42 PM    HGB 15.1 10/04/2022 11:59 AM    Hematocrit (POC) 49 (H) 10/02/2011 01:42 PM    HCT 45.4 10/04/2022 11:59 AM    PLATELET 379 28/49/9267 11:59 AM    MCV 99.3 (H) 10/04/2022 11:59 AM       Lab Results   Component Value Date/Time    TSH 0.98 10/04/2022 11:59 AM         Lab Results   Component Value Date/Time    Cholesterol, total 251 (H) 07/16/2018 11:04 AM    Cholesterol, total 214 (H) 07/21/2016 11:52 AM    HDL Cholesterol 81 07/16/2018 11:04 AM    HDL Cholesterol 76 07/21/2016 11:52 AM    LDL, calculated 150 (H) 07/16/2018 11:04 AM    LDL, calculated 124 (H) 07/21/2016 11:52 AM    Triglyceride 100 07/16/2018 11:04 AM    Triglyceride 68 07/21/2016 11:52 AM                Please note that this dictation was completed with [employer], the [employer] voice recognition software. Quite often unanticipated grammatical, syntax, homophones, and other interpretative errors are inadvertently transcribed by the computer software. Please disregard these errors. Please excuse any errors that have escaped final proofreading.

## 2023-03-07 NOTE — PROGRESS NOTES
Visit Vitals  /80 (BP 1 Location: Left upper arm, BP Patient Position: Sitting, BP Cuff Size: Adult)   Pulse 87   Resp 18   Ht 5' 6\" (1.676 m)   Wt 112 lb (50.8 kg)   SpO2 99%   BMI 18.08 kg/m²

## 2023-07-24 ENCOUNTER — HOSPITAL ENCOUNTER (EMERGENCY)
Facility: HOSPITAL | Age: 68
Discharge: HOME | End: 2023-07-24
Attending: EMERGENCY MEDICINE
Payer: MEDICARE

## 2023-07-24 VITALS
TEMPERATURE: 98.7 F | RESPIRATION RATE: 16 BRPM | SYSTOLIC BLOOD PRESSURE: 154 MMHG | OXYGEN SATURATION: 97 % | BODY MASS INDEX: 17.75 KG/M2 | HEART RATE: 91 BPM | WEIGHT: 110 LBS | DIASTOLIC BLOOD PRESSURE: 93 MMHG

## 2023-07-24 DIAGNOSIS — R21 RASH AND OTHER NONSPECIFIC SKIN ERUPTION: Primary | ICD-10-CM

## 2023-07-24 PROCEDURE — 6370000000 HC RX 637 (ALT 250 FOR IP): Performed by: NURSE PRACTITIONER

## 2023-07-24 PROCEDURE — 99283 EMERGENCY DEPT VISIT LOW MDM: CPT

## 2023-07-24 RX ORDER — PREDNISONE 20 MG/1
60 TABLET ORAL DAILY
Status: DISCONTINUED | OUTPATIENT
Start: 2023-07-24 | End: 2023-07-24 | Stop reason: HOSPADM

## 2023-07-24 RX ORDER — FAMOTIDINE 20 MG/1
20 TABLET, FILM COATED ORAL ONCE
Status: COMPLETED | OUTPATIENT
Start: 2023-07-24 | End: 2023-07-24

## 2023-07-24 RX ORDER — CETIRIZINE HYDROCHLORIDE 10 MG/1
10 TABLET ORAL DAILY
Qty: 10 TABLET | Refills: 0 | Status: SHIPPED | OUTPATIENT
Start: 2023-07-24 | End: 2023-08-03

## 2023-07-24 RX ORDER — CETIRIZINE HYDROCHLORIDE 10 MG/1
10 TABLET ORAL DAILY
Status: DISCONTINUED | OUTPATIENT
Start: 2023-07-24 | End: 2023-07-24 | Stop reason: HOSPADM

## 2023-07-24 RX ORDER — PREDNISONE 5 MG/1
TABLET ORAL
Qty: 1 EACH | Refills: 0 | Status: SHIPPED | OUTPATIENT
Start: 2023-07-24

## 2023-07-24 RX ORDER — FAMOTIDINE 20 MG/1
20 TABLET, FILM COATED ORAL 2 TIMES DAILY
Qty: 28 TABLET | Refills: 0 | Status: SHIPPED | OUTPATIENT
Start: 2023-07-24 | End: 2023-08-07

## 2023-07-24 RX ADMIN — PREDNISONE 60 MG: 20 TABLET ORAL at 15:20

## 2023-07-24 RX ADMIN — FAMOTIDINE 20 MG: 20 TABLET, FILM COATED ORAL at 15:20

## 2023-07-24 RX ADMIN — CETIRIZINE HYDROCHLORIDE 10 MG: 10 TABLET, FILM COATED ORAL at 15:20

## 2023-07-24 ASSESSMENT — ENCOUNTER SYMPTOMS
EYE DISCHARGE: 0
ABDOMINAL PAIN: 0
SHORTNESS OF BREATH: 0

## 2023-07-24 ASSESSMENT — LIFESTYLE VARIABLES: HOW OFTEN DO YOU HAVE A DRINK CONTAINING ALCOHOL: 2-3 TIMES A WEEK

## 2023-07-24 ASSESSMENT — PAIN - FUNCTIONAL ASSESSMENT: PAIN_FUNCTIONAL_ASSESSMENT: NONE - DENIES PAIN

## 2023-07-24 NOTE — DISCHARGE INSTRUCTIONS
Thank you for allowing us to provide you with medical care today. We realize that you have many choices for your emergency care needs. We thank you for choosing [facility]. Please choose us in the future for any continued health care needs. We hope we addressed all of your medical concerns. We strive to provide excellent quality care in the Emergency Department. Anything less than excellent does not meet our expectations. The exam and treatment you received in the Emergency Department were for an emergent problem and are not intended as complete care. It is important that you follow up with a doctor, nurse practitioner, or [address] [place] [address] assistant for ongoing care. If your symptoms worsen or you do not improve as expected and you are unable to reach your usual health care provider, you should return to the Emergency Department. We are available 24 hours a day. Take this sheet with you when you go to your follow-up visit. If you have any problem arranging the follow-up visit, contact the Emergency Department immediately. Make an appointment your family doctor for follow up of this visit. Return to the ER if you are unable to be seen in a timely manner.

## 2023-07-24 NOTE — ED TRIAGE NOTES
Patient presents to the ED with chief complaint of rash to bilateral arms, leg, and abdomen. Patient states it started on Wednesday morning. She was sitting on her balcony that morning and when she took her robe off, she noticed bumps along her arms.      + itching

## 2023-07-24 NOTE — ED NOTES
Discharge instructions reviewed with pt and copy given along with RX by this RN. Patient educated on follow up with PCP and is ambulatory from ED in no sign of distress or discomfort.         Chance Campa RN  07/24/23 [id number]

## 2023-07-24 NOTE — ED PROVIDER NOTES
[facility]  EMERGENCY DEPARTMENT ENCOUNTER      Pt Name: Milli Mae  MRN: [id number]  [address] [facility] [place] 1955  Date of evaluation: 7/24/2023  Provider: JORDAN Zaragoza   Patient is a 74 y.o. female with pmhx of of HTN and TBI  who presents today with complaints of rash. Patient states she has a red itchy rash that began on both forearms and is continuing to spread to her abdomen and left upper leg. She also endorses pruritus to her back. She is concerned it may have come from an insect. No one else in the home has a rash. Denies detergent or body wash changes. Has been using topical hydrocortisone and Benadryl with no improvement. There are no other complaints, changes or physical findings at this time. PAST MEDICAL HISTORY     Past Medical History:   Diagnosis Date    ADD (attention deficit disorder)     Arthritis     AVM (arteriovenous malformation) brain 2009    CVA (cerebral infarction) 2009    Hypertension     Inattention          SURGICAL HISTORY       Past Surgical History:   Procedure Laterality Date    HEENT      HYSTERECTOMY (CERVIX STATUS UNKNOWN)      NEUROLOGICAL SURGERY      anurism surgery, AVM malformatiom    ORTHOPEDIC SURGERY      TONSILLECTOMY      TOTAL HIP ARTHROPLASTY      WRIST FRACTURE SURGERY           CURRENT MEDICATIONS       Discharge Medication List as of 7/24/2023  4:09 PM        CONTINUE these medications which have NOT CHANGED    Details   amphetamine-dextroamphetamine (ADDERALL) 20 MG tablet Take 20 mg by mouth daily. Historical Med      aspirin 81 MG EC tablet Take 81 mg by mouth dailyHistorical Med      atorvastatin (LIPITOR) 20 MG tablet Take 20 mg by mouth dailyHistorical Med      Calcium Carbonate-Vitamin D (OYSTER SHELL CALCIUM/D) 500-5 MG-MCG TABS Take 1 tablet by mouth 3 times daily (with meals)Historical Med      lisinopril (PRINIVIL;ZESTRIL) 10 MG tablet Take 1 tablet by mouth dailyHistorical Med      metoprolol succinate (TOPROL XL) 25

## 2023-09-08 ENCOUNTER — OFFICE VISIT (OUTPATIENT)
Age: 68
End: 2023-09-08
Payer: MEDICARE

## 2023-09-08 VITALS
OXYGEN SATURATION: 98 % | DIASTOLIC BLOOD PRESSURE: 70 MMHG | WEIGHT: 113 LBS | HEIGHT: 66 IN | HEART RATE: 90 BPM | BODY MASS INDEX: 18.16 KG/M2 | RESPIRATION RATE: 18 BRPM | SYSTOLIC BLOOD PRESSURE: 116 MMHG

## 2023-09-08 DIAGNOSIS — I10 ESSENTIAL (PRIMARY) HYPERTENSION: Primary | ICD-10-CM

## 2023-09-08 DIAGNOSIS — Q28.2 ARTERIOVENOUS MALFORMATION OF CEREBRAL VESSELS: ICD-10-CM

## 2023-09-08 DIAGNOSIS — R00.2 PALPITATIONS: ICD-10-CM

## 2023-09-08 PROCEDURE — G8427 DOCREV CUR MEDS BY ELIG CLIN: HCPCS | Performed by: SPECIALIST

## 2023-09-08 PROCEDURE — 4004F PT TOBACCO SCREEN RCVD TLK: CPT | Performed by: SPECIALIST

## 2023-09-08 PROCEDURE — 93005 ELECTROCARDIOGRAM TRACING: CPT | Performed by: SPECIALIST

## 2023-09-08 PROCEDURE — 1090F PRES/ABSN URINE INCON ASSESS: CPT | Performed by: SPECIALIST

## 2023-09-08 PROCEDURE — 3017F COLORECTAL CA SCREEN DOC REV: CPT | Performed by: SPECIALIST

## 2023-09-08 PROCEDURE — 93010 ELECTROCARDIOGRAM REPORT: CPT | Performed by: SPECIALIST

## 2023-09-08 PROCEDURE — 1123F ACP DISCUSS/DSCN MKR DOCD: CPT | Performed by: SPECIALIST

## 2023-09-08 PROCEDURE — 99214 OFFICE O/P EST MOD 30 MIN: CPT | Performed by: SPECIALIST

## 2023-09-08 PROCEDURE — G8399 PT W/DXA RESULTS DOCUMENT: HCPCS | Performed by: SPECIALIST

## 2023-09-08 PROCEDURE — 3074F SYST BP LT 130 MM HG: CPT | Performed by: SPECIALIST

## 2023-09-08 PROCEDURE — 3078F DIAST BP <80 MM HG: CPT | Performed by: SPECIALIST

## 2023-09-08 PROCEDURE — G8419 CALC BMI OUT NRM PARAM NOF/U: HCPCS | Performed by: SPECIALIST

## 2023-09-08 RX ORDER — METOPROLOL SUCCINATE 25 MG/1
25 TABLET, EXTENDED RELEASE ORAL EVERY EVENING
Qty: 90 TABLET | Refills: 3 | Status: SHIPPED | OUTPATIENT
Start: 2023-09-08

## 2023-09-08 ASSESSMENT — PATIENT HEALTH QUESTIONNAIRE - PHQ9
SUM OF ALL RESPONSES TO PHQ QUESTIONS 1-9: 0
SUM OF ALL RESPONSES TO PHQ9 QUESTIONS 1 & 2: 0
1. LITTLE INTEREST OR PLEASURE IN DOING THINGS: 0
2. FEELING DOWN, DEPRESSED OR HOPELESS: 0

## 2023-09-08 NOTE — PROGRESS NOTES
[address] [facility]                                                            OFFICE NOTE        Lázaro Gardner M.D.,THUY Marjorie Mcdonald   1955  [id number]    Date/Time:  9/8/20238:06 AM            SUBJECTIVE:  She is feeling well just a little more palpitation last 3 days since she has run out of Toprol she has not been able to take it. No chest pain or shortness of breath reported no presyncopal syncopal episodes           Assessment/Plan  1. Palpitations: discussed with the patient does have symptomatic PVCs. Discussed stress echo of 1/23  negative for ischemia but pvcs noted    Discussed 48-hour Holter monitor with 1.84% pvcs this was from February 2023. Discussed need for caffeine avoidance. Her thyroid is well controlled closely followed by primary care physician. Her labs potassium magnesium were essentially normal in October 2022 at the time of the emergency room visit. She continues with Adderall which may have an impact on the palpitations. There is been no change in dosing Adderall in several years now. Now much better on toprol with no palpitations or reported side effects  This will be for now continued (palpitation increased only last 2 to 3 days since she has run out of Toprol). Her EKG reveals a normal sinus rhythm with occasional premature ventricular contractions    2. Chest tightness:resolved, normal stress echo beside frequent pvcs    3. Hypertension: Well-controlled. 4.  Hyperlipidemia: Closely followed by primary care physician. ADHD: She remains on Adderall. 5.  Depression anxiety: On Zoloft. Patient aware of negative effects of toprol on depression and will let me know if any changes    Tobacco cessation stressed.     See her back in 6 months otherwise           HPI   Very pleasant 79years old female with a past medical history

## 2023-10-25 NOTE — ED PROVIDER NOTES
Patient is a 63 y.o. female presenting with palpitations. Palpitations       History of present illness: She reports that her blood pressures have been recently. She reports having episodes of rapid heartbeat and palpitations for about a week off and on. Some of the episodes have been brief. The episode this morning lasted several hours but she thinks it resolved before she arrived to triage. Patient reports that she ran out of her blood pressure medication for several days about a week ago and has been taking double doses recently. Otherwise she denies running out of medications or changing medications. She denies shortness of breath, chest pain, fever, and cough. Patient has hypertension and smoker. She denies diabetes and high cholesterol. Patient has an appointment to see her primary care nurse practitioner tomorrow. She presently is having frequent PVCs but apparently does not perceive these. Nothing specifically is increasing or decreasing her symptoms. Symptoms were moderate earlier today and mild over the last week. Patient tells me she is not having symptoms in the ER. Past Medical History:   Diagnosis Date    ADD (attention deficit disorder)     Arthritis     AVM (arteriovenous malformation) brain 2009    CVA (cerebral infarction) 2009    Hypertension     Inattention        Past Surgical History:   Procedure Laterality Date    HX HEENT      HX HYSTERECTOMY      HX ORTHOPAEDIC      NEUROLOGICAL PROCEDURE UNLISTED      anurism surgery, AVM malformatiom         Family History:   Problem Relation Age of Onset    Hypertension Mother     Elevated Lipids Mother     Diabetes Father        Social History     Social History    Marital status:      Spouse name: N/A    Number of children: N/A    Years of education: N/A     Occupational History    Not on file.      Social History Main Topics    Smoking status: Current Every Day Smoker     Packs/day: 0.50     Years: 16.00    Smokeless tobacco: Never Used    Alcohol use 1.2 oz/week     1 Glasses of wine, 1 Cans of beer, 0 Standard drinks or equivalent per week    Drug use: No    Sexual activity: Not Currently     Other Topics Concern    Not on file     Social History Narrative    , her ex  has passed away, one son in [facility], two daughters- one in [facility] and one in [place]. ALLERGIES: Review of patient's allergies indicates no known allergies. Review of Systems   Cardiovascular: Positive for palpitations. All other systems reviewed and are negative. Vitals:    07/15/18 1459 07/15/18 1706 07/15/18 1715 07/15/18 1800   BP: (!) 159/92 127/81 139/62 130/76   Pulse: (!) 104 98 98 93   Resp: 16  20 14   Temp: 98.1 °F (36.7 °C)      SpO2: 99%  97% 96%   Weight: 49.9 kg (110 lb)      Height: 5' 6\" (1.676 m)               Physical Exam   Constitutional: She appears well-developed and well-nourished. HENT:   Head: Normocephalic and atraumatic. Mouth/Throat: Oropharynx is clear and moist.   Eyes: EOM are normal. Pupils are equal, round, and reactive to light. Neck: Normal range of motion. Neck supple. Cardiovascular: Normal heart sounds and intact distal pulses. Exam reveals no gallop and no friction rub. No murmur heard. ;frequent ectopic beats   Pulmonary/Chest: Effort normal. No respiratory distress. She has no wheezes. She has no rales. Abdominal: Soft. There is no tenderness. There is no rebound. Musculoskeletal: Normal range of motion. She exhibits no tenderness. Neurological: She is alert. No cranial nerve deficit. Motor; symmetric   Skin: No erythema. Psychiatric: She has a normal mood and affect. Her behavior is normal.   Nursing note and vitals reviewed. [facility]      ED Course       Procedures         ED EKG interpretation:  Rhythm: sinus tachycardia and PVC's; and regular . Rate (approx.): 105;  Axis: normal; P wave: normal; QRS interval: normal ; ST/T wave: normal; in Lead: ; Other findings:Biatrial enlargement. This EKG was interpreted by Lindsey Tesfaye MD,ED Provider.  6:47 PM Litfulo Counseling: I discussed with the patient the risks of Litfulo therapy including but not limited to upper respiratory tract infections, shingles, cold sores, and nausea. Live vaccines should be avoided.  This medication has been linked to serious infections; higher rate of mortality; malignancy and lymphoproliferative disorders; major adverse cardiovascular events; thrombosis; gastrointestinal perforations; neutropenia; lymphopenia; anemia; liver enzyme elevations; and lipid elevations.

## 2023-12-18 ENCOUNTER — TELEPHONE (OUTPATIENT)
Age: 68
End: 2023-12-18

## 2023-12-18 NOTE — TELEPHONE ENCOUNTER
Pt called to schedule an apt for a checkup because she stated that her heart rate at rest has been going up and down between 70 to 100 for the past few weeks. Pt stated she would like to come in to get checked out. Pt is scheduled for 01/23/2024 with HUMBERTO Velazquez. Pt also stated she has had a low grade fever for about two weeks. She is scheduled to see here PCP on Thursday 12/21/2023. Pt is requesting refill on medication Metoprolol 25 mg, with about 4 more days remaining.       [facility] ph # [phone]  Pt  -  [phone]

## 2023-12-19 NOTE — TELEPHONE ENCOUNTER
RC from pt, ID verified. Pt states she has woke up today with no fever and her HR has been normal. She states she had a low grade fever and N/V for a few days with a fluctuating HR. She is feeling much better today. States she has appt with PCP Thursday and will keep this appt for now. Advised her Metoprolol was sent back in September with refills, so contact them to have refilled. She has a follow up in January. No further questions.    TC to [facility]. States pt's RX is available for refill and they will get it refilled for her. No further questions.

## 2024-08-09 ENCOUNTER — HOSPITAL ENCOUNTER (EMERGENCY)
Facility: HOSPITAL | Age: 69
Discharge: HOME | End: 2024-08-09
Attending: EMERGENCY MEDICINE
Payer: MEDICARE

## 2024-08-09 ENCOUNTER — APPOINTMENT (OUTPATIENT)
Facility: HOSPITAL | Age: 69
End: 2024-08-09
Payer: MEDICARE

## 2024-08-09 VITALS
TEMPERATURE: 98.5 F | OXYGEN SATURATION: 95 % | DIASTOLIC BLOOD PRESSURE: 105 MMHG | SYSTOLIC BLOOD PRESSURE: 147 MMHG | HEART RATE: 93 BPM | RESPIRATION RATE: 15 BRPM

## 2024-08-09 DIAGNOSIS — M25.511 RIGHT SHOULDER PAIN, UNSPECIFIED CHRONICITY: Primary | ICD-10-CM

## 2024-08-09 PROCEDURE — 99284 EMERGENCY DEPT VISIT MOD MDM: CPT

## 2024-08-09 PROCEDURE — 96372 THER/PROPH/DIAG INJ SC/IM: CPT

## 2024-08-09 PROCEDURE — 73030 X-RAY EXAM OF SHOULDER: CPT

## 2024-08-09 PROCEDURE — 6360000002 HC RX W HCPCS: Performed by: EMERGENCY MEDICINE

## 2024-08-09 RX ORDER — KETOROLAC TROMETHAMINE 30 MG/ML
30 INJECTION, SOLUTION INTRAMUSCULAR; INTRAVENOUS ONCE
Status: COMPLETED | OUTPATIENT
Start: 2024-08-09 | End: 2024-08-09

## 2024-08-09 RX ADMIN — KETOROLAC TROMETHAMINE 30 MG: 30 INJECTION, SOLUTION INTRAMUSCULAR at 12:37

## 2024-08-09 ASSESSMENT — PAIN SCALES - GENERAL
PAINLEVEL_OUTOF10: 10
PAINLEVEL_OUTOF10: 10

## 2024-08-09 ASSESSMENT — PAIN - FUNCTIONAL ASSESSMENT: PAIN_FUNCTIONAL_ASSESSMENT: 0-10

## 2024-08-09 ASSESSMENT — PAIN DESCRIPTION - LOCATION
LOCATION: ARM
LOCATION: SHOULDER

## 2024-08-09 ASSESSMENT — PAIN DESCRIPTION - ORIENTATION
ORIENTATION: RIGHT
ORIENTATION: RIGHT

## 2024-08-09 ASSESSMENT — PAIN DESCRIPTION - DESCRIPTORS: DESCRIPTORS: ACHING

## 2024-08-09 NOTE — ED TRIAGE NOTES
Pt to ED via EMS from home with cc of R shoulder pain x 2 months but worse today. Pt told to follow up with orthopedist and get steroid shots but was not able to.

## 2024-08-09 NOTE — ED PROVIDER NOTES
[facility] EMERGENCY DEP  EMERGENCY DEPARTMENT ENCOUNTER      Patient Name: Xi Ndiaye  MRN: [id number]  Birthdate 1955  Date of Evaluation: 8/9/2024  Physician: Michael Garrido MD    CHIEF COMPLAINT       Chief Complaint   Patient presents with    Shoulder Pain       HISTORY OF PRESENT ILLNESS   (Location/Symptom, Timing/Onset, Context/Setting, Quality, Duration, Modifying Factors, Severity)   Xi Ndiaye, 68 y.o., female     68-year-old female presents with atraumatic right shoulder pain.  Symptoms been ongoing for over a month.  She has been seen by her primary care physician and told that she may need steroid injections.  She denies any trauma          Nursing Notes were reviewed.    REVIEW OF SYSTEMS    (Not required)   Review of Systems    Except as noted above the remainder of the review of systems was reviewed and negative.     PAST MEDICAL HISTORY     Past Medical History:   Diagnosis Date    ADD (attention deficit disorder)     Arthritis     AVM (arteriovenous malformation) brain 2009    CVA (cerebral infarction) 2009    Hypertension     Inattention        SURGICAL HISTORY       Past Surgical History:   Procedure Laterality Date    HEENT      HYSTERECTOMY (CERVIX STATUS UNKNOWN)      NEUROLOGICAL SURGERY      anurism surgery, AVM malformatiom    ORTHOPEDIC SURGERY      TONSILLECTOMY      TOTAL HIP ARTHROPLASTY      WRIST FRACTURE SURGERY         CURRENT MEDICATIONS       Discharge Medication List as of 8/9/2024 12:59 PM        CONTINUE these medications which have NOT CHANGED    Details   Multiple Vitamin (MULTIVITAMIN ADULT PO) Take 1 tablet by mouth dailyHistorical Med      metoprolol succinate (TOPROL XL) 25 MG extended release tablet Take 1 tablet by mouth every evening, Disp-90 tablet, R-3Normal      amphetamine-dextroamphetamine (ADDERALL) 20 MG tablet Take 1 tablet by mouth daily.Historical Med      aspirin 81 MG EC tablet Take 1 tablet by mouth dailyHistorical Med      atorvastatin (LIPITOR) 20

## 2024-09-23 RX ORDER — METOPROLOL SUCCINATE 25 MG/1
25 TABLET, EXTENDED RELEASE ORAL NIGHTLY
Qty: 30 TABLET | Refills: 0 | Status: SHIPPED | OUTPATIENT
Start: 2024-09-23

## (undated) DEVICE — SOLUTION IRRIG 1000ML STRL H2O USP PLAS POUR BTL

## (undated) DEVICE — 6619 2 PTNT ISO SYS INCISE AREA&LT;(&GT;&&LT;)&GT;P: Brand: STERI-DRAPE™ IOBAN™ 2

## (undated) DEVICE — DRAPE,U/ SHT,SPLIT,PLAS,STERIL: Brand: MEDLINE

## (undated) DEVICE — PADDING CAST SPEC 6INX4YD COT --

## (undated) DEVICE — PACK,BASIC,SIRUS,V: Brand: MEDLINE

## (undated) DEVICE — 3.2MM GUIDE WIRE 400MM

## (undated) DEVICE — DRESSING ALG W4XL8IN AG FOAM SUPERABSORBENT SIL ANTIMIC

## (undated) DEVICE — DRESSING PETRO W3XL8IN N ADH OIL EMUL GZ CURAD

## (undated) DEVICE — DRAPE SURG W41XL74IN CLR FULL SZ C ARM 3 ADH POLY STRP E

## (undated) DEVICE — 4-PORT MANIFOLD: Brand: NEPTUNE 2

## (undated) DEVICE — SOLUTION IRRIG 1000ML 0.9% SOD CHL USP POUR PLAS BTL

## (undated) DEVICE — TOWEL SURG W17XL27IN STD BLU COT NONFENESTRATED PREWASHED

## (undated) DEVICE — 3M™ MEDIPORE™ H SOFT CLOTH SURGICAL TAPE 2864, 4 INCH X 10 YARD (10CM X 9,14M), 12 ROLLS/CASE: Brand: 3M™ MEDIPORE™

## (undated) DEVICE — BASIN ST MAJOR-NO CAUTERY: Brand: MEDLINE INDUSTRIES, INC.

## (undated) DEVICE — GLOVE SURG SZ 8 L12IN FNGR THK94MIL STD WHT LTX FREE

## (undated) DEVICE — COVER,MAYO STAND,STERILE: Brand: MEDLINE

## (undated) DEVICE — PADDING CST 4INX4YD --

## (undated) DEVICE — REM POLYHESIVE ADULT PATIENT RETURN ELECTRODE: Brand: VALLEYLAB

## (undated) DEVICE — ROCKER SWITCH PENCIL BLADE ELECTRODE, HOLSTER: Brand: EDGE

## (undated) DEVICE — SPONGE LAP 18X18IN STRL -- 5/PK

## (undated) DEVICE — GLOVE ORTHO 8   MSG9480

## (undated) DEVICE — Device

## (undated) DEVICE — SPONGE GZ W4XL4IN COT 12 PLY TYP VII WVN C FLD DSGN

## (undated) DEVICE — BIT DRL L330MM DIA4.2MM CALIB 100MM 3 FLUT QUIK CPL

## (undated) DEVICE — GOWN,SIRUS,POLYRNF,BRTHSLV,XL,30/CS: Brand: MEDLINE